# Patient Record
Sex: FEMALE | Race: WHITE | NOT HISPANIC OR LATINO | Employment: OTHER | URBAN - METROPOLITAN AREA
[De-identification: names, ages, dates, MRNs, and addresses within clinical notes are randomized per-mention and may not be internally consistent; named-entity substitution may affect disease eponyms.]

---

## 2017-03-07 ENCOUNTER — TELEPHONE (OUTPATIENT)
Dept: MEDICAL GROUP | Facility: MEDICAL CENTER | Age: 82
End: 2017-03-07

## 2017-03-07 DIAGNOSIS — R73.01 IMPAIRED FASTING GLUCOSE: ICD-10-CM

## 2017-03-07 NOTE — TELEPHONE ENCOUNTER
1. Caller Name: Ang Lancaster  Call Back Number: [phone] (home)         Patient approves a detailed voicemail message: yes    2. SPECIFIC Action To Be Taken: Orders needed    3. Diagnosis/Clinical Reason for Request: Upcoming apt    4. Specialty & Provider Name/Lab/Imaging Location: [facility]    5. Is appointment scheduled for requested order/referral: yes - apt is on 3/15/17

## 2017-03-30 ENCOUNTER — HOSPITAL ENCOUNTER (OUTPATIENT)
Dept: LAB | Facility: MEDICAL CENTER | Age: 82
End: 2017-03-30
Attending: NURSE PRACTITIONER
Payer: MEDICARE

## 2017-03-30 DIAGNOSIS — R73.01 IMPAIRED FASTING GLUCOSE: ICD-10-CM

## 2017-03-30 LAB
ALBUMIN SERPL BCP-MCNC: 4 G/DL (ref 3.2–4.9)
ALBUMIN/GLOB SERPL: 1.3 G/DL
ALP SERPL-CCNC: 46 U/L (ref 30–99)
ALT SERPL-CCNC: 13 U/L (ref 2–50)
ANION GAP SERPL CALC-SCNC: 7 MMOL/L (ref 0–11.9)
AST SERPL-CCNC: 17 U/L (ref 12–45)
BILIRUB SERPL-MCNC: 0.8 MG/DL (ref 0.1–1.5)
BUN SERPL-MCNC: 15 MG/DL (ref 8–22)
CALCIUM SERPL-MCNC: 8.9 MG/DL (ref 8.4–10.2)
CHLORIDE SERPL-SCNC: 102 MMOL/L (ref 96–112)
CHOLEST SERPL-MCNC: 191 MG/DL (ref 100–199)
CO2 SERPL-SCNC: 28 MMOL/L (ref 20–33)
CREAT SERPL-MCNC: 1.04 MG/DL (ref 0.5–1.4)
CREAT UR-MCNC: 83.8 MG/DL
EST. AVERAGE GLUCOSE BLD GHB EST-MCNC: 117 MG/DL
GFR SERPL CREATININE-BSD FRML MDRD: 51 ML/MIN/1.73 M 2
GLOBULIN SER CALC-MCNC: 3 G/DL (ref 1.9–3.5)
GLUCOSE SERPL-MCNC: 100 MG/DL (ref 65–99)
HBA1C MFR BLD: 5.7 % (ref 0–5.6)
HDLC SERPL-MCNC: 106 MG/DL
LDLC SERPL CALC-MCNC: 78 MG/DL
MICROALBUMIN UR-MCNC: <0.7 MG/DL
MICROALBUMIN/CREAT UR: NORMAL MG/G (ref 0–30)
POTASSIUM SERPL-SCNC: 3.9 MMOL/L (ref 3.6–5.5)
PROT SERPL-MCNC: 7 G/DL (ref 6–8.2)
SODIUM SERPL-SCNC: 137 MMOL/L (ref 135–145)
TRIGL SERPL-MCNC: 37 MG/DL (ref 0–149)

## 2017-03-30 PROCEDURE — 80061 LIPID PANEL: CPT | Mod: GA

## 2017-03-30 PROCEDURE — 83036 HEMOGLOBIN GLYCOSYLATED A1C: CPT | Mod: GA

## 2017-03-30 PROCEDURE — 82043 UR ALBUMIN QUANTITATIVE: CPT

## 2017-03-30 PROCEDURE — 80053 COMPREHEN METABOLIC PANEL: CPT

## 2017-03-30 PROCEDURE — 36415 COLL VENOUS BLD VENIPUNCTURE: CPT

## 2017-03-30 PROCEDURE — 82570 ASSAY OF URINE CREATININE: CPT

## 2017-04-04 ENCOUNTER — OFFICE VISIT (OUTPATIENT)
Dept: MEDICAL GROUP | Facility: MEDICAL CENTER | Age: 82
End: 2017-04-04
Payer: MEDICARE

## 2017-04-04 VITALS
BODY MASS INDEX: 22.71 KG/M2 | HEART RATE: 58 BPM | OXYGEN SATURATION: 98 % | HEIGHT: 64 IN | SYSTOLIC BLOOD PRESSURE: 110 MMHG | TEMPERATURE: 98.1 F | WEIGHT: 133 LBS | DIASTOLIC BLOOD PRESSURE: 68 MMHG

## 2017-04-04 DIAGNOSIS — M79.604 RIGHT LEG PAIN: ICD-10-CM

## 2017-04-04 DIAGNOSIS — R25.3 EYE MUSCLE TWITCHES: ICD-10-CM

## 2017-04-04 DIAGNOSIS — R73.01 IMPAIRED FASTING GLUCOSE: ICD-10-CM

## 2017-04-04 DIAGNOSIS — N18.30 CKD (CHRONIC KIDNEY DISEASE) STAGE 3, GFR 30-59 ML/MIN (HCC): ICD-10-CM

## 2017-04-04 DIAGNOSIS — R19.4 CHANGE IN BOWEL HABITS: ICD-10-CM

## 2017-04-04 DIAGNOSIS — M25.551 RIGHT HIP PAIN: ICD-10-CM

## 2017-04-04 PROCEDURE — 99214 OFFICE O/P EST MOD 30 MIN: CPT | Performed by: NURSE PRACTITIONER

## 2017-04-04 PROCEDURE — 1036F TOBACCO NON-USER: CPT | Performed by: NURSE PRACTITIONER

## 2017-04-04 PROCEDURE — G8432 DEP SCR NOT DOC, RNG: HCPCS | Performed by: NURSE PRACTITIONER

## 2017-04-04 PROCEDURE — 1101F PT FALLS ASSESS-DOCD LE1/YR: CPT | Performed by: NURSE PRACTITIONER

## 2017-04-04 PROCEDURE — G8420 CALC BMI NORM PARAMETERS: HCPCS | Performed by: NURSE PRACTITIONER

## 2017-04-04 PROCEDURE — 4040F PNEUMOC VAC/ADMIN/RCVD: CPT | Performed by: NURSE PRACTITIONER

## 2017-04-04 RX ORDER — DIAZEPAM 5 MG/1
5 TABLET ORAL
Qty: 90 TAB | Refills: 1 | Status: SHIPPED | OUTPATIENT
Start: 2017-04-04 | End: 2017-05-04 | Stop reason: SDUPTHER

## 2017-04-04 NOTE — MR AVS SNAPSHOT
"Ang CONSTANCE Lancaster   2017 3:00 PM   Office Visit   MRN: [id number]    Department:  [facility]   Dept Phone:  [phone]    Description:  Female : 1935   Provider:  MARIUSZ Guerrero           Allergies as of 2017     Allergen Noted Reactions    Nkda [No Known Drug Allergy] 09/10/2010       Other Environmental 2009       pollens      You were diagnosed with     Right hip pain   [777567]   improved.  call if pain reoccurs.  will do hip and lumbar xray    Right leg pain   [618315]       CKD (chronic kidney disease) stage 3, GFR 30-59 ml/min   [611900]   inc water intake.    Change in bowel habits   [021336]   check FIT test.  f/u with pt wiht results.     Impaired fasting glucose   [790.21.ICD-9-CM]   stable and improved.  plan recheck 1 yr    Eye muscle twitches   [316858]   refill valium. if gets worse f/u for eval      Vital Signs     Blood Pressure Pulse Temperature Height Weight Body Mass Index    110/68 mmHg 58 36.7 °C (98.1 °F) 1.626 m (5' 4\") 60.328 kg (133 lb) 22.82 kg/m2    Oxygen Saturation Breastfeeding? Smoking Status             98% No Former Smoker         Basic Information     Date Of Birth Sex Race Ethnicity Preferred Language    1935 Female White Non- English      Your appointments     Oct 05, 2017  2:00 PM   Established Patient with MARIUSZ Guerrero   [facility])    [address]  [facility] [address]   [phone]           You will be receiving a confirmation call a few days before your appointment from our automated call confirmation system.              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   Unknown - Present    Degenerative joint disease M19.90   Unknown - Present    Impaired fasting glucose R73.01   Unknown - Present    Osteoporosis M81.0   Unknown - Present      Health Maintenance        Date Due Completion Dates    IMM DTaP/Tdap/Td Vaccine (1 - Tdap) 2/2/1956 ---    IMM ZOSTER VACCINE 2/2/1997 ---    COLONOSCOPY 8/1/2017 8/1/2007 (Done)    Override on 8/1/2007: Done    BONE DENSITY 6/28/2021 6/28/2016, 6/27/2014, 6/1/2012, 5/19/2010            Current Immunizations     13-VALENT PCV PREVNAR 7/8/2015 12:24 PM    Influenza TIV (IM) 10/21/2011    Influenza Vaccine Adult HD 9/27/2016 10:32 AM, 9/30/2014    Influenza Vaccine Pediatric 11/10/2010 11:30 AM    Pneumococcal polysaccharide vaccine (PPSV-23) 10/21/2007      Below and/or attached are the medications your provider expects you to take. Review all of your home medications and newly ordered medications with your provider and/or pharmacist. Follow medication instructions as directed by your provider and/or pharmacist. Please keep your medication list with you and share with your provider. Update the information when medications are discontinued, doses are changed, or new medications (including over-the-counter products) are added; and carry medication information at all times in the event of emergency situations     Allergies:  NKDA - (reactions not documented)     OTHER ENVIRONMENTAL - (reactions not documented)               Medications  Valid as of: April 04, 2017 -  3:39 PM    Generic Name Brand Name Tablet Size Instructions for use    Calcium Carbonate-Vitamin D   Take 1 Tab by mouth every day.        DiazePAM (Tab) VALIUM 5 MG Take 1 Tab by mouth every 24 hours as needed for Anxiety.        Famotidine (Tab) PEPCID 20 MG Take 20 mg by mouth every day.        Multiple Vitamins-Minerals   Take 1 Tab by mouth every day.        .                 Medicines prescribed today were sent to:     [id number] - [place]  [place] - [address] [facility]    [address] [facility] [place] [address]    Phone: [phone] Fax: [phone]    Open 24 Hours?: No      Medication refill instructions:       If your prescription bottle indicates you have medication refills left, it is not necessary to call your provider’s office. Please contact your pharmacy and they will refill your medication.    If your prescription bottle indicates you do not have any refills left, you may request refills at any time through one of the following ways: The online [employer] system (except Urgent Care), by calling your provider’s office, or by asking your pharmacy to contact your provider’s office with a refill request. Medication refills are processed only during regular business hours and may not be available until the next business day. Your provider may request additional information or to have a follow-up visit with you prior to refilling your medication.   *Please Note: Medication refills are assigned a new Rx number when refilled electronically. Your pharmacy may indicate that no refills were authorized even though a new prescription for the same medication is available at the pharmacy. Please request the medicine by name with the pharmacy before contacting your provider for a refill.        Your To Do List     Future Labs/Procedures Complete By Expires    OCCULT BLOOD FECES IMMUNOASSAY  As directed 4/5/2018         [facility] Status: Patient Declined

## 2017-04-04 NOTE — PROGRESS NOTES
Subjective:      Ang Lancaster is a 82 y.o. female who presents with No chief complaint on file.            HPI  Seen in f/u for difficulty walking.  About 4 months ago she woke up with her rt leg aching.  She finally went back to sleep but walking helped the pain.  The pain then continued at nite.    Then about 1 month later she was walking.  About 1/4 mile later her rt hip started hurting.  She started to limp.  After that she had pain for several weeks.    That is finally getting better.  Leg pain at nite is getting better.  Will still occas wake her with milder pain.    Reviewed lab with pt.  Her CMP is wnl except glucose is borderline.  a1c is stable and sl dec from last year at 5.7.  Alb/cr ratio, LP is wnl.    GFR is dec to 51.  Was 64 last time.  Not drinking much water.  She feels that her eye twitching is not controlled.  She is still on valium for tx.    She has recently noted change in bowel habits.  She is having several little bms during the day instead of one lg bm daily that she was having.     Patient Active Problem List    Diagnosis Date Noted   • Osteoporosis    • Recurrent UTI    • Meige syndrome (blepharospasm with oromandibular dystonia)    • Degenerative joint disease    • Impaired fasting glucose    • Vertigo 03/20/2012   • Dystonia 11/10/2011   • Preventative health care 11/10/2009   • [place] (hard of hearing)      Current Outpatient Prescriptions   Medication Sig Dispense Refill   • diazepam (VALIUM) 5 MG Tab TAKE 1 TABLET BY MOUTH EVERY 24 HOURS AS NEEDED FOR TREMORS-MUST LAST 30 DAYS 30 Tab 5   • famotidine (PEPCID) 20 MG TABS Take 20 mg by mouth every day.     • MULTIVITAL PO Take 1 Tab by mouth every day.     • CALTRATE 600+D PO Take 1 Tab by mouth every day.       No current facility-administered medications for this visit.     Allergies   Allergen Reactions   • Nkda [No Known Drug Allergy]    • Other Environmental      pollens       ROS    Review of Systems   Constitutional: Negative.   "Negative for fever, chills, weight loss, malaise/fatigue and diaphoresis.   HENT: Negative.  Negative for hearing loss, ear pain, nosebleeds, congestion, sore throat, neck pain, tinnitus and ear discharge.    Respiratory: Negative.  Negative for cough, hemoptysis, sputum production, shortness of breath, wheezing and stridor.    Cardiovascular: Negative.  Negative for chest pain, palpitations, orthopnea, claudication, leg swelling and PND.   Gastrointestinal: denies nausea, vomiting, diarrhea, constipation, heartburn, melena or hematochezia.  Genitourinary: Denies dysuria, hematuria, urinary incontinence, frequency or urgency.         Objective:     /68 mmHg  Pulse 58  Temp(Src) 36.7 °C (98.1 °F)  Ht 1.626 m (5' 4\")  Wt 60.328 kg (133 lb)  BMI 22.82 kg/m2  SpO2 98%  Breastfeeding? No     Physical Exam      Physical Exam   Vitals reviewed.  Constitutional: oriented to person, place, and time. appears well-developed and well-nourished. No distress.   Cardiovascular: Normal rate, regular rhythm, normal heart sounds and intact distal pulses.  Exam reveals no gallop and no friction rub.  No murmur heard.  No carotid bruits. Twitching marlyn eyes and head.   Pulmonary/Chest: Effort normal and breath sounds normal. No stridor. No respiratory distress. no wheezes or rales. exhibits no tenderness.   Musculoskeletal: Normal range of motion. exhibits 1+ left pedal edema. marlyn pedal pulses 2+.  Lymphadenopathy: no cervical or supraclavicular adenopathy.   Abd:  No CVAT,  Soft,  Bs noted in all quadrants.  No HSM.  No abdominal tenderness.  Neurological: alert and oriented to person, place, and time. exhibits normal muscle tone. Coordination normal.   Skin: Skin is warm and dry. no diaphoresis.   Psychiatric: normal mood and affect. behavior is normal.            Assessment/Plan:     1. Right hip pain      improved.  call if pain reoccurs.  will do hip and lumbar xray   2. Right leg pain     3. CKD (chronic kidney " disease) stage 3, GFR 30-59 ml/min      inc water intake.   4. Change in bowel habits  OCCULT BLOOD FECES IMMUNOASSAY    check FIT test.  f/u with pt wiht results.    5. Impaired fasting glucose      stable and improved.  plan recheck 1 yr   6. Eye muscle twitches  diazepam (VALIUM) 5 MG Tab    refill valium. if gets worse f/u for eval

## 2017-04-07 LAB — HEMOCCULT STL QL IA: NEGATIVE

## 2017-04-12 ENCOUNTER — TELEPHONE (OUTPATIENT)
Dept: MEDICAL GROUP | Facility: MEDICAL CENTER | Age: 82
End: 2017-04-12

## 2017-04-12 RX ORDER — DIAZEPAM 5 MG/1
5 TABLET ORAL
Qty: 5 TAB | Refills: 0 | Status: SHIPPED
Start: 2017-04-12 | End: 2017-10-11

## 2017-04-12 NOTE — TELEPHONE ENCOUNTER
I gave her 5 tabs only.  Please fax to pharmacy and let her know.  I doubt that it will make a difference.

## 2017-04-12 NOTE — TELEPHONE ENCOUNTER
Pt called stated she got the diazepam-pt is asking if she can try the brand name of valium . Pt states she only wants a few tabs to compare the brand and generic.

## 2017-05-03 DIAGNOSIS — R25.3 EYE MUSCLE TWITCHES: ICD-10-CM

## 2017-05-03 RX ORDER — DIAZEPAM 5 MG/1
TABLET ORAL
OUTPATIENT
Start: 2017-05-03

## 2017-05-03 NOTE — TELEPHONE ENCOUNTER
She is not due for refill.  I had given her a small supply of brand name only valium.  Is that what she is calling for?

## 2017-05-04 RX ORDER — DIAZEPAM 5 MG/1
5 TABLET ORAL
Qty: 90 TAB | Refills: 1 | Status: SHIPPED
Start: 2017-05-04 | End: 2017-12-11 | Stop reason: SDUPTHER

## 2017-05-04 NOTE — TELEPHONE ENCOUNTER
I see a generic valium refill from 4/4/17 with refills.  Please check with pharm acy and do  to see if that was received by pharmacy or filled.

## 2017-05-04 NOTE — TELEPHONE ENCOUNTER
Pt states she was only given 5 brand name tabs. Pt states she wants the regular script-pt states it has been over 30 days since the last script

## 2017-10-11 ENCOUNTER — OFFICE VISIT (OUTPATIENT)
Dept: MEDICAL GROUP | Facility: MEDICAL CENTER | Age: 82
End: 2017-10-11
Payer: MEDICARE

## 2017-10-11 VITALS
DIASTOLIC BLOOD PRESSURE: 70 MMHG | SYSTOLIC BLOOD PRESSURE: 110 MMHG | HEIGHT: 64 IN | OXYGEN SATURATION: 98 % | TEMPERATURE: 97.9 F | BODY MASS INDEX: 22.71 KG/M2 | WEIGHT: 133 LBS | HEART RATE: 81 BPM

## 2017-10-11 DIAGNOSIS — Z91.81 RISK FOR FALLS: ICD-10-CM

## 2017-10-11 DIAGNOSIS — G51.4 FACIAL TWITCHING: ICD-10-CM

## 2017-10-11 DIAGNOSIS — R60.0 PEDAL EDEMA: ICD-10-CM

## 2017-10-11 PROCEDURE — 99214 OFFICE O/P EST MOD 30 MIN: CPT | Performed by: NURSE PRACTITIONER

## 2017-10-11 ASSESSMENT — PATIENT HEALTH QUESTIONNAIRE - PHQ9: CLINICAL INTERPRETATION OF PHQ2 SCORE: 0

## 2017-10-11 NOTE — PROGRESS NOTES
Subjective:     Ang Lancaster is a 82 y.o. female who presents with No chief complaint on file.  .    HPI:   Seen in f/u for left leg swelling.  Feeling well.   She is having left leg swelling.  Its used to be on marlyn feet, now just on left.  Only lower leg and foot is swollen.  No pain.  No SOB.  ABRAHAN in 2015 was wnl. She has SVETLANA hose but not wearing them.   She continues to use the valium for facial twitches.        Patient Active Problem List    Diagnosis Date Noted   • Risk for falls 10/11/2017   • Osteoporosis    • Recurrent UTI    • Meige syndrome (blepharospasm with oromandibular dystonia)    • Degenerative joint disease    • Impaired fasting glucose    • Vertigo 03/20/2012   • Dystonia 11/10/2011   • Preventative health care 11/10/2009   • [place] (hard of hearing)        Current medicines (including changes today)  Current Outpatient Prescriptions   Medication Sig Dispense Refill   • diazepam (VALIUM) 5 MG Tab Take 1 Tab by mouth every 24 hours as needed for Anxiety. 90 Tab 1   • famotidine (PEPCID) 20 MG TABS Take 20 mg by mouth every day.     • MULTIVITAL PO Take 1 Tab by mouth every day.     • CALTRATE 600+D PO Take 1 Tab by mouth every day.       No current facility-administered medications for this visit.        Allergies   Allergen Reactions   • Nkda [No Known Drug Allergy]    • Other Environmental      pollens       ROS  Constitutional: Negative. Negative for fever, chills, weight loss, malaise/fatigue and diaphoresis.   HENT: Negative. Negative for hearing loss, ear pain, nosebleeds, congestion, sore throat, neck pain, tinnitus and ear discharge.   Respiratory: Negative. Negative for cough, hemoptysis, sputum production, shortness of breath, wheezing and stridor.   Cardiovascular: Negative. Negative for chest pain, palpitations, orthopnea, claudication, leg swelling and PND.   Gastrointestinal: Denies nausea, vomiting, diarrhea, constipation, heartburn, melena or hematochezia.  Genitourinary: Denies  "dysuria, hematuria, urinary incontinence, frequency or urgency.        Objective:     Blood pressure 110/70, pulse 81, temperature 36.6 °C (97.9 °F), height 1.626 m (5' 4\"), weight 60.3 kg (133 lb), SpO2 98 %, not currently breastfeeding. Body mass index is 22.83 kg/m².    Physical Exam:  Vitals reviewed.  Constitutional: Oriented to person, place, and time. appears well-developed and well-nourished. No distress.   Cardiovascular: Normal rate, regular rhythm, normal heart sounds and intact distal pulses. Exam reveals no gallop and no friction rub. No murmur heard. No carotid bruits.   Pulmonary/Chest: Effort normal and breath sounds normal. No stridor. No respiratory distress. no wheezes or rales. exhibits no tenderness.   Musculoskeletal: Normal range of motion. exhibits no edema. marlyn pedal pulses 2+.  Lymphadenopathy: No cervical or supraclavicular adenopathy.   Neurological: Alert and oriented to person, place, and time. exhibits normal muscle tone.  Skin: Skin is warm and dry. No diaphoresis.   Psychiatric: Normal mood and affect. Behavior is normal.      Assessment and Plan:     The following treatment plan was discussed:    1. Pedal edema      left leg only.  wear compression hose.  elevate legs with sitting.  low na diet.    2. Facial twitching      stable with valium.  f/u 3/18.  call for lab slip     3. Risk for falls  Patient identified as fall risk.  Appropriate orders and counseling given.         Followup: Return in about 5 months (around 3/11/2018).  "

## 2017-10-23 ENCOUNTER — NON-PROVIDER VISIT (OUTPATIENT)
Dept: URGENT CARE | Facility: CLINIC | Age: 82
End: 2017-10-23
Payer: MEDICARE

## 2017-10-23 DIAGNOSIS — Z23 NEED FOR INFLUENZA VACCINATION: ICD-10-CM

## 2017-10-23 PROCEDURE — G0008 ADMIN INFLUENZA VIRUS VAC: HCPCS | Performed by: FAMILY MEDICINE

## 2017-10-23 PROCEDURE — 90662 IIV NO PRSV INCREASED AG IM: CPT | Performed by: FAMILY MEDICINE

## 2017-11-15 DIAGNOSIS — R25.3 EYE MUSCLE TWITCHES: ICD-10-CM

## 2017-11-16 RX ORDER — DIAZEPAM 5 MG/1
TABLET ORAL
OUTPATIENT
Start: 2017-11-16

## 2017-12-08 DIAGNOSIS — R25.3 EYE MUSCLE TWITCHES: ICD-10-CM

## 2017-12-08 RX ORDER — DIAZEPAM 5 MG/1
TABLET ORAL
OUTPATIENT
Start: 2017-12-08

## 2017-12-11 RX ORDER — DIAZEPAM 5 MG/1
5 TABLET ORAL
Qty: 90 TAB | Refills: 1 | Status: SHIPPED
Start: 2017-12-11 | End: 2018-04-22

## 2018-01-15 ENCOUNTER — PATIENT OUTREACH (OUTPATIENT)
Dept: HEALTH INFORMATION MANAGEMENT | Facility: OTHER | Age: 83
End: 2018-01-15

## 2018-01-15 NOTE — PROGRESS NOTES
1. Attempt #: 1    2. [facility] Verified: NO    3. Verify PCP: yes    4. Care Team Updated:       •   DME Company (gait device, O2, CPAP, etc.): YES-NONE       •   Other Specialists (eye doctor, derm, GYN, cardiology, endo, etc): YES    5.  Reviewed/Updated the following with patient:       •   Communication Preference Obtained? YES       •   Preferred Pharmacy? YES       •   Preferred Lab? YES       •   Family History (document living status of immediate family members and if + hx of cancer, diabetes, hypertension, hyperlipidemia, heart attack, stroke) YES. Was Abstract Encounter opened and chart updated? YES    6. [employer] Activation: DECLINED    7. [employer] Chasity: no    8. Annual Wellness Visit Scheduling  Scheduling Status:Scheduled      9. Care Gap Scheduling (Attempt to Schedule EACH Overdue Care Gap!)     Health Maintenance Due   Topic Date Due   • IMM DTaP/Tdap/Td Vaccine (1 - Tdap) 02/02/1956   • IMM ZOSTER VACCINE  02/02/1997   • Annual Wellness Visit  07/08/2016        Scheduled patient for Annual Wellness Visit      10. Patient was advised: “This is a free wellness visit. The provider will screen for medical conditions to help you stay healthy. If you have other concerns to address you may be asked to discuss these at a separate visit or there may be an additional fee.”     11. Patient was informed to arrive 15 min prior to their scheduled appointment and bring in their medication bottles.

## 2018-01-26 ENCOUNTER — TELEPHONE (OUTPATIENT)
Dept: MEDICAL GROUP | Facility: MEDICAL CENTER | Age: 83
End: 2018-01-26

## 2018-01-26 DIAGNOSIS — R73.09 ELEVATED HEMOGLOBIN A1C: ICD-10-CM

## 2018-01-26 NOTE — TELEPHONE ENCOUNTER
Lab orders placed for cmp and a1c.   Prior lipid panel reviewed, repeat not needed at this time (insurance will not cover)

## 2018-01-26 NOTE — TELEPHONE ENCOUNTER
1. Caller Name: Pt                      Call Back Number: [phone] (home)     2. Message: Pt called requesting lab orders to be placed prior to her appt on 2/5    3. Patient approves office to leave a detailed voicemail/MyChart message: yes

## 2018-01-29 ENCOUNTER — TELEPHONE (OUTPATIENT)
Dept: MEDICAL GROUP | Facility: MEDICAL CENTER | Age: 83
End: 2018-01-29

## 2018-01-29 NOTE — TELEPHONE ENCOUNTER
PVP WITH OUTREACH  Future Appointments       Provider Department Center    2/5/2018 1:20 PM MARIUSZ Maria; [facility]  [facility]    3/15/2018 2:00 PM MARIUSZ Maria [facility]          ANNUAL WELLNESS VISIT PRE-VISIT PLANNING     1.  Immunizations were updated in [facility] using WebIZ?: Yes       •  WebIZ Recommendations:   PCV-13 (Prevnar 13)   Zoster, Live (Shingles)   Influenza w/preserv.   Tdap            •  Is patient due for Tdap? YES. Patient was not notified of copay/out of pocket cost.       •  Is patient due for Shingles? YES. Patient was not notified of copay/out of pocket cost.     2.  Specialty Comments was updated with diagnosis information provided by [facility]: NO

## 2018-01-31 ENCOUNTER — HOSPITAL ENCOUNTER (OUTPATIENT)
Dept: LAB | Facility: MEDICAL CENTER | Age: 83
End: 2018-01-31
Attending: NURSE PRACTITIONER
Payer: MEDICARE

## 2018-01-31 DIAGNOSIS — R73.09 ELEVATED HEMOGLOBIN A1C: ICD-10-CM

## 2018-01-31 LAB
ALBUMIN SERPL BCP-MCNC: 4 G/DL (ref 3.2–4.9)
ALBUMIN/GLOB SERPL: 1.7 G/DL
ALP SERPL-CCNC: 37 U/L (ref 30–99)
ALT SERPL-CCNC: 7 U/L (ref 2–50)
ANION GAP SERPL CALC-SCNC: 5 MMOL/L (ref 0–11.9)
AST SERPL-CCNC: 14 U/L (ref 12–45)
BILIRUB SERPL-MCNC: 0.6 MG/DL (ref 0.1–1.5)
BUN SERPL-MCNC: 17 MG/DL (ref 8–22)
CALCIUM SERPL-MCNC: 8.7 MG/DL (ref 8.5–10.5)
CHLORIDE SERPL-SCNC: 107 MMOL/L (ref 96–112)
CO2 SERPL-SCNC: 28 MMOL/L (ref 20–33)
CREAT SERPL-MCNC: 0.93 MG/DL (ref 0.5–1.4)
EST. AVERAGE GLUCOSE BLD GHB EST-MCNC: 114 MG/DL
GLOBULIN SER CALC-MCNC: 2.4 G/DL (ref 1.9–3.5)
GLUCOSE SERPL-MCNC: 88 MG/DL (ref 65–99)
HBA1C MFR BLD: 5.6 % (ref 0–5.6)
POTASSIUM SERPL-SCNC: 4 MMOL/L (ref 3.6–5.5)
PROT SERPL-MCNC: 6.4 G/DL (ref 6–8.2)
SODIUM SERPL-SCNC: 140 MMOL/L (ref 135–145)

## 2018-01-31 PROCEDURE — 36415 COLL VENOUS BLD VENIPUNCTURE: CPT

## 2018-01-31 PROCEDURE — 80053 COMPREHEN METABOLIC PANEL: CPT

## 2018-01-31 PROCEDURE — 83036 HEMOGLOBIN GLYCOSYLATED A1C: CPT | Mod: GA

## 2018-02-05 ENCOUNTER — OFFICE VISIT (OUTPATIENT)
Dept: MEDICAL GROUP | Facility: MEDICAL CENTER | Age: 83
End: 2018-02-05
Payer: MEDICARE

## 2018-02-05 VITALS
SYSTOLIC BLOOD PRESSURE: 110 MMHG | TEMPERATURE: 97 F | OXYGEN SATURATION: 98 % | HEIGHT: 64 IN | HEART RATE: 83 BPM | BODY MASS INDEX: 22.81 KG/M2 | WEIGHT: 133.6 LBS | DIASTOLIC BLOOD PRESSURE: 70 MMHG

## 2018-02-05 DIAGNOSIS — N39.0 RECURRENT UTI: ICD-10-CM

## 2018-02-05 DIAGNOSIS — G24.4 MEIGE SYNDROME (BLEPHAROSPASM WITH OROMANDIBULAR DYSTONIA): ICD-10-CM

## 2018-02-05 DIAGNOSIS — H91.8X2 OTHER SPECIFIED HEARING LOSS OF LEFT EAR, UNSPECIFIED HEARING STATUS ON CONTRALATERAL SIDE: ICD-10-CM

## 2018-02-05 DIAGNOSIS — R42 VERTIGO: ICD-10-CM

## 2018-02-05 DIAGNOSIS — M15.9 PRIMARY OSTEOARTHRITIS INVOLVING MULTIPLE JOINTS: ICD-10-CM

## 2018-02-05 DIAGNOSIS — R73.01 IFG (IMPAIRED FASTING GLUCOSE): ICD-10-CM

## 2018-02-05 DIAGNOSIS — Z91.81 RISK FOR FALLS: ICD-10-CM

## 2018-02-05 DIAGNOSIS — M81.6 LOCALIZED OSTEOPOROSIS WITHOUT CURRENT PATHOLOGICAL FRACTURE: ICD-10-CM

## 2018-02-05 PROCEDURE — G0439 PPPS, SUBSEQ VISIT: HCPCS | Performed by: NURSE PRACTITIONER

## 2018-02-05 RX ORDER — TIZANIDINE 4 MG/1
4 TABLET ORAL
Qty: 30 TAB | Refills: 3 | Status: SHIPPED | OUTPATIENT
Start: 2018-02-05 | End: 2018-04-22

## 2018-02-05 ASSESSMENT — PATIENT HEALTH QUESTIONNAIRE - PHQ9
SUM OF ALL RESPONSES TO PHQ QUESTIONS 1-9: 2
5. POOR APPETITE OR OVEREATING: 0 - NOT AT ALL
CLINICAL INTERPRETATION OF PHQ2 SCORE: 2

## 2018-02-05 NOTE — PROGRESS NOTES
Chief Complaint   Patient presents with   • Annual Wellness Visit         HPI:  Ang is a 83 y.o. here for Medicare Annual Wellness Visit.  Seen in f/u for HRA.    Reviewed lab withpt.  Her a1c is down from 5.7 to 5.6.    GFR is up from 51 to 58.    CMP, LP is wnl    Patient Active Problem List    Diagnosis Date Noted   • IFG (impaired fasting glucose)    • Risk for falls 10/11/2017   • Osteoporosis    • Recurrent UTI    • Meige syndrome (blepharospasm with oromandibular dystonia)    • Degenerative joint disease    • Impaired fasting glucose    • Vertigo 03/20/2012   • Preventative health care 11/10/2009   • [place] (hard of hearing)        Current Outpatient Prescriptions   Medication Sig Dispense Refill   • tizanidine (ZANAFLEX) 4 MG Tab Take 1 Tab by mouth every bedtime. 30 Tab 3   • diazepam (VALIUM) 5 MG Tab Take 1 Tab by mouth every 24 hours as needed for Anxiety. 90 Tab 1   • famotidine (PEPCID) 20 MG TABS Take 20 mg by mouth every day.     • MULTIVITAL PO Take 1 Tab by mouth every day.     • CALTRATE 600+D PO Take 1 Tab by mouth every day.       No current facility-administered medications for this visit.         Patient is taking medications as noted in medication list.  Current supplements as per medication list.     Allergies: Nkda [no known drug allergy] and Other environmental    Current social contact/activities: Pt watches television.     Is patient current with immunizations? No, due for TDAP and ZOSTAVAX (Shingles). Patient is interested in receiving NONE today.    She  reports that she quit smoking about 14 years ago. Her smoking use included Cigarettes. She has a 67.50 pack-year smoking history. She has never used smokeless tobacco. She reports that she drinks about 3.5 - 17.5 oz of alcohol per week . She reports that she does not use drugs.  Counseling given: Yes        DPA/Advanced directive: Patient has Advanced Directive, but it is not on file. Instructed to bring in a copy to scan into their  chart.    ROS:    Gait: Uses no assistive device   Ostomy: no   Other tubes: no   Amputations: no   Chronic oxygen use no   Last eye exam 1 year ago    Wears hearing aids: no   : Reports urinary leakage during the last 6 months that has not interfered at all with their daily activities or sleep.  Review of Systems   Constitutional: Negative.  Negative for fever, chills, weight loss, malaise/fatigue and diaphoresis.   HENT: Negative.  Negative for hearing loss, ear pain, nosebleeds, congestion, sore throat, neck pain, tinnitus and ear discharge.    Eyes: Negative.  Negative for blurred vision, double vision, photophobia, pain, discharge and redness.   Respiratory: Negative.  Negative for cough, hemoptysis, sputum production, shortness of breath, wheezing and stridor.    Cardiovascular: Negative.  Negative for chest pain, palpitations, orthopnea, claudication, leg swelling and PND.   Gastrointestinal: Negative.  Negative for nausea, vomiting, abdominal pain, diarrhea, constipation, blood in stool and melena. occas heartburn.  Genitourinary: Negative.  Negative for dysuria, urgenc y, frequency, incontinence, hematuria and flank pain.   Musculoskeletal: Negative.  Negative for myalgias, back pain, joint pain and falls.   Skin: Negative.  Negative for itching and rash.   Neurological: Negative.  Negative for dizziness, tingling, tremors, sensory change, speech change, focal weakness, seizures, loss of consciousness, weakness and headaches.   Endo/Heme/Allergies: Negative.  Negative for environmental allergies and polydipsia. Does not bruise/bleed easily.   Psychiatric/Behavioral: Negative.  Negative for depression, suicidal ideas, hallucinations, memory loss and substance abuse. The patient is not nervous/anxious and does not have insomnia.    All other systems reviewed and are negative.          Depression Screening    Little interest or pleasure in doing things?  0 - not at all  Feeling down, depressed, or hopeless?  2 - more than half the days  Trouble falling or staying asleep, or sleeping too much?  0 - not at all  Feeling tired or having little energy?  0 - not at all  Poor appetite or overeating?  0 - not at all  Feeling bad about yourself - or that you are a failure or have let yourself or your family down? 0 - not at all  Trouble concentrating on things, such as reading the newspaper or watching television? 0 - not at all  Moving or speaking so slowly that other people could have noticed.  Or the opposite - being so fidgety or restless that you have been moving around a lot more than usual?  0 - not at all  Thoughts that you would be better off dead, or of hurting yourself?  0 - not at all  Patient Health Questionnaire Score: 2      If depressive symptoms identified deferred to follow up visit unless specifically addressed in assessment and plan.    Interpretation of PHQ-9 Total Score   Score Severity   1-4 No Depression   5-9 Mild Depression   10-14 Moderate Depression   15-19 Moderately Severe Depression   20-27 Severe Depression      Screening for Cognitive Impairment    Three Minute Recall (apple, watch, pneny)  1/3    Draw clock face with all 12 numbers set to the hand to show 10 minutes past 11 o'clock  1 5/5  If cognitive concerns identified, deferred for follow up unless specifically addressed in assessment and plan.    Fall Risk Assessment    Has the patient had two or more falls in the last year or any fall with injury in the last year?  No  If fall risk identified, deferred for follow up unless specifically addressed in assessment and plan.      Safety Assessment    Throw rugs on floor.  Yes  Handrails on all stairs.  Yes  Good lighting in all hallways.  Yes  Difficulty hearing.  No  Patient counseled about all safety risks that were identified.    Functional Assessment ADLs    Are there any barriers preventing you from cooking for yourself or meeting nutritional needs?  No.    Are there any barriers preventing  you from driving safely or obtaining transportation?  No.    Are there any barriers preventing you from using a telephone or calling for help?  No.    Are there any barriers preventing you from shopping?  No.    Are there any barriers preventing you from taking care of your own finances?  No.    Are there any barriers preventing you from managing your medications?  No.    Are you currently engaging any exercise or physical activity?  No.       Health Maintenance Summary                IMM DTaP/Tdap/Td Vaccine Overdue 2/2/1956     IMM ZOSTER VACCINE Overdue 2/2/1997     Annual Wellness Visit Overdue 7/8/2016      Done 7/8/2015     COLON CANCER SCREENING ANNUAL FIT Next Due 4/6/2018      Done 4/6/2017 OCCULT BLOOD,FECAL,IMMUNOASSAY     Patient has more history with this topic...    BONE DENSITY Next Due 6/28/2021      Done 6/28/2016 DS-BONE DENSITY STUDY (DEXA)     Patient has more history with this topic...          Patient Care Team:  MARIUSZ Maria as PCP - General  Ming Whitten M.D. as Consulting Physician (Ophthalmology)      Social History   Substance Use Topics   • Smoking status: Former Smoker     Packs/day: 1.50     Years: 45.00     Types: Cigarettes     Quit date: 1/1/2004   • Smokeless tobacco: Never Used   • Alcohol use 3.5 - 17.5 oz/week     7 - 35 Glasses of wine per week      Comment: occ - wine     Family History   Problem Relation Age of Onset   • Stroke Mother    • Diabetes Father    • Alcohol/Drug Father      alcholism   • Heart Disease Paternal Grandmother      possible MI     She  has a past medical history of Degenerative joint disease; [place] (hard of hearing); IFG (impaired fasting glucose); Impaired fasting glucose; Meige syndrome (blepharospasm with oromandibular dystonia); Osteoporosis; Recurrent UTI; Risk for falls (10/11/2017); and Vertigo. She also has no past medical history of Diabetes.   Past Surgical History:   Procedure Laterality Date   • TUBAL COAGULATION LAPAROSCOPIC  "BILATERAL  1973         Exam:     Blood pressure 110/70, pulse 83, temperature 36.1 °C (97 °F), height 1.626 m (5' 4\"), weight 60.6 kg (133 lb 9.6 oz), SpO2 98 %. Body mass index is 22.93 kg/m².    Hearing fair.  Left ear deaf  Dentition good  Alert, oriented in no acute distress.  Eye contact is good, speech goal directed, affect calm  Physical Exam   Vitals reviewed.  Constitutional: oriented to person, place, and time. appears well-developed and well-nourished. No distress.   HENT: Head: Normocephalic and atraumatic. Bilateral tympanic membranes wnl w/o bulging.  Right Ear: External ear normal. Left Ear: External ear normal. Nose: Nose normal.  Mouth/Throat: Oropharynx is clear and moist. No oropharyngeal exudate. marlyn tm wnl. Eyes: Conjunctivae and EOM are normal. Pupils are equal, round, and reactive to light. Right eye exhibits no discharge. Left eye exhibits no discharge. No scleral icterus.    Neck: Normal range of motion. Neck supple. No JVD present.   Cardiovascular: Normal rate, regular rhythm, normal heart sounds and intact distal pulses.  Exam reveals no gallop and no friction rub.  No murmur heard.  No carotid bruits   Pulmonary/Chest: Effort normal and breath sounds normal. No stridor. No respiratory distress. no wheezes or rales. exhibits no tenderness.   Abdominal: Soft. Bowel sounds are normal. exhibits no distension and no mass. No tenderness. no rebound and no guarding.   Musculoskeletal: Normal range of motion. exhibits 1+ left pedal edema or tenderness.  marlyn pedal pulses 2+.  Lymphadenopathy:  no cervical or supraclavicular adenopathy.   Neurological: alert and oriented to person, place, and time. has normal reflexes. displays normal reflexes. No cranial nerve deficit. exhibits normal muscle tone. Coordination normal.   Skin: Skin is warm and dry. No rash noted. no diaphoresis. No erythema. No pallor.   Psychiatric: normal mood and affect. behavior is normal.         Assessment and Plan. The " following treatment and monitoring plan is recommended:    1. Vertigo      occas sx only.  no tx needed   2. Risk for falls      will try and inc walking to strengthening.  declines PT referral   3. Recurrent UTI      NO CURRENT SX   4. Localized osteoporosis without current pathological fracture      declines meds.  no fx history   5. Meige syndrome (blepharospasm with oromandibular dystonia)  tizanidine (ZANAFLEX) 4 MG Tab    on valium.  will add tizanidine to see if helps   6. IFG (impaired fasting glucose)  LIPID PROFILE    MICROALBUMIN CREAT RATIO URINE    improved from last check   7. Other specified hearing loss of left ear, unspecified hearing status on contralateral side      left ear deaf.  Habematolel RT ear with tv only.    8. Primary osteoarthritis involving multiple joints      stable w/o tx needed         Services suggested: No services needed at this time  Health Care Screening recommendations as per orders if indicated.  Referrals offered: PT/OT/Nutrition counseling/Behavioral Health/Smoking cessation as per orders if indicated.    Discussion today about general wellness and lifestyle habits:    · Prevent falls and reduce trip hazards; Cautioned about securing or removing rugs.  · Have a working fire alarm and carbon monoxide detector;   · Engage in regular physical activity and social activities       Follow-up: 3 months for lab review

## 2018-02-05 NOTE — ASSESSMENT & PLAN NOTE
No hx of fx.  Her last dexa in 2016 showed osteoporosis.  She is on d and ca++.  Walks for exercise.  She declines med tx at this time.

## 2018-02-05 NOTE — ASSESSMENT & PLAN NOTE
She remains on valium for her facial spasms.  She just read that if she takes antibspasmodic with the valium it will  Help  Will start with daily tizanidine.

## 2018-02-05 NOTE — ASSESSMENT & PLAN NOTE
Pt reports poor balance.  She feels like she will fall sometimes but has not fallen recently.  She feels that her legs are weak.

## 2018-02-07 ENCOUNTER — TELEPHONE (OUTPATIENT)
Dept: MEDICAL GROUP | Facility: MEDICAL CENTER | Age: 83
End: 2018-02-07

## 2018-02-08 NOTE — TELEPHONE ENCOUNTER
Pt states she does NOT  feel confused as a side effect from the medicine, but states she was confused with the medication dosage. She wanted to verify when and how much of the medication to take. Confirmed with pt's chart that she is to take one tablet at every bed time. She states that she understands what the directions.     Pt states that she is having spasms in her face in the morning and she doesn't think the medication is helping for that.     Pt would like to know if she can take tizanadine with valium.

## 2018-02-08 NOTE — TELEPHONE ENCOUNTER
Spoke with pt by phone to review meds and instructions.  Informed she can continue with the tizanidine at bedtime (she found 1/2 tab to be effective enough). May use the valium if needed during the day, just do not take within a few hours of each other. Verbalized understanding.

## 2018-02-08 NOTE — TELEPHONE ENCOUNTER
Pt left message saying she is confused and having balance issues and received new rx she's not sure she can take the whole thing and doesn't know if she should take it with valium. Please call and advise asap.

## 2018-02-08 NOTE — TELEPHONE ENCOUNTER
If this is a new issue and truely having confusion she should be seen in UC or ER.  The tizanidine can cause drowsiness and loss of balance but not confusion.  i would be worried about stroke

## 2018-03-13 ENCOUNTER — OFFICE VISIT (OUTPATIENT)
Dept: MEDICAL GROUP | Facility: MEDICAL CENTER | Age: 83
End: 2018-03-13
Payer: MEDICARE

## 2018-03-13 VITALS
TEMPERATURE: 97.8 F | BODY MASS INDEX: 22.88 KG/M2 | HEART RATE: 77 BPM | DIASTOLIC BLOOD PRESSURE: 66 MMHG | OXYGEN SATURATION: 97 % | WEIGHT: 134 LBS | SYSTOLIC BLOOD PRESSURE: 108 MMHG | HEIGHT: 64 IN

## 2018-03-13 DIAGNOSIS — G51.39 FACIAL SPASM: ICD-10-CM

## 2018-03-13 PROCEDURE — 99213 OFFICE O/P EST LOW 20 MIN: CPT | Performed by: NURSE PRACTITIONER

## 2018-03-25 NOTE — TELEPHONE ENCOUNTER
Was the patient seen in the last year in this department? Yes     Does patient have an active prescription for medications requested? No     Received Request Via: Pharmacy     Last seen: 10/11/2017   No

## 2018-04-22 ENCOUNTER — APPOINTMENT (OUTPATIENT)
Dept: RADIOLOGY | Facility: MEDICAL CENTER | Age: 83
DRG: 480 | End: 2018-04-22
Attending: ORTHOPAEDIC SURGERY
Payer: MEDICARE

## 2018-04-22 ENCOUNTER — HOSPITAL ENCOUNTER (INPATIENT)
Facility: MEDICAL CENTER | Age: 83
LOS: 3 days | DRG: 480 | End: 2018-04-25
Attending: EMERGENCY MEDICINE | Admitting: INTERNAL MEDICINE
Payer: MEDICARE

## 2018-04-22 ENCOUNTER — RESOLUTE PROFESSIONAL BILLING HOSPITAL PROF FEE (OUTPATIENT)
Dept: HOSPITALIST | Facility: MEDICAL CENTER | Age: 83
End: 2018-04-22
Payer: MEDICARE

## 2018-04-22 ENCOUNTER — APPOINTMENT (OUTPATIENT)
Dept: RADIOLOGY | Facility: MEDICAL CENTER | Age: 83
DRG: 480 | End: 2018-04-22
Attending: EMERGENCY MEDICINE
Payer: MEDICARE

## 2018-04-22 DIAGNOSIS — M16.9 OSTEOARTHRITIS OF HIP, UNSPECIFIED LATERALITY, UNSPECIFIED OSTEOARTHRITIS TYPE: ICD-10-CM

## 2018-04-22 DIAGNOSIS — S72.002A CLOSED FRACTURE OF NECK OF LEFT FEMUR, INITIAL ENCOUNTER (HCC): ICD-10-CM

## 2018-04-22 LAB
ANION GAP SERPL CALC-SCNC: 7 MMOL/L (ref 0–11.9)
BASOPHILS # BLD AUTO: 0.6 % (ref 0–1.8)
BASOPHILS # BLD: 0.05 K/UL (ref 0–0.12)
BUN SERPL-MCNC: 18 MG/DL (ref 8–22)
CALCIUM SERPL-MCNC: 9.1 MG/DL (ref 8.4–10.2)
CHLORIDE SERPL-SCNC: 105 MMOL/L (ref 96–112)
CO2 SERPL-SCNC: 24 MMOL/L (ref 20–33)
CREAT SERPL-MCNC: 0.93 MG/DL (ref 0.5–1.4)
EOSINOPHIL # BLD AUTO: 0.08 K/UL (ref 0–0.51)
EOSINOPHIL NFR BLD: 0.9 % (ref 0–6.9)
ERYTHROCYTE [DISTWIDTH] IN BLOOD BY AUTOMATED COUNT: 43.2 FL (ref 35.9–50)
GLUCOSE SERPL-MCNC: 118 MG/DL (ref 65–99)
HCT VFR BLD AUTO: 39.5 % (ref 37–47)
HGB BLD-MCNC: 13.8 G/DL (ref 12–16)
IMM GRANULOCYTES # BLD AUTO: 0.04 K/UL (ref 0–0.11)
IMM GRANULOCYTES NFR BLD AUTO: 0.5 % (ref 0–0.9)
LYMPHOCYTES # BLD AUTO: 1.2 K/UL (ref 1–4.8)
LYMPHOCYTES NFR BLD: 14 % (ref 22–41)
MCH RBC QN AUTO: 32.3 PG (ref 27–33)
MCHC RBC AUTO-ENTMCNC: 34.9 G/DL (ref 33.6–35)
MCV RBC AUTO: 92.5 FL (ref 81.4–97.8)
MONOCYTES # BLD AUTO: 0.5 K/UL (ref 0–0.85)
MONOCYTES NFR BLD AUTO: 5.8 % (ref 0–13.4)
NEUTROPHILS # BLD AUTO: 6.71 K/UL (ref 2–7.15)
NEUTROPHILS NFR BLD: 78.2 % (ref 44–72)
NRBC # BLD AUTO: 0 K/UL
NRBC BLD-RTO: 0 /100 WBC
PLATELET # BLD AUTO: 224 K/UL (ref 164–446)
PMV BLD AUTO: 10.5 FL (ref 9–12.9)
POTASSIUM SERPL-SCNC: 3.8 MMOL/L (ref 3.6–5.5)
RBC # BLD AUTO: 4.27 M/UL (ref 4.2–5.4)
SODIUM SERPL-SCNC: 136 MMOL/L (ref 135–145)
WBC # BLD AUTO: 8.6 K/UL (ref 4.8–10.8)

## 2018-04-22 PROCEDURE — 36415 COLL VENOUS BLD VENIPUNCTURE: CPT

## 2018-04-22 PROCEDURE — 700111 HCHG RX REV CODE 636 W/ 250 OVERRIDE (IP)

## 2018-04-22 PROCEDURE — 160009 HCHG ANES TIME/MIN: Performed by: ORTHOPAEDIC SURGERY

## 2018-04-22 PROCEDURE — 770006 HCHG ROOM/CARE - MED/SURG/GYN SEMI*

## 2018-04-22 PROCEDURE — A6222 GAUZE <=16 IN NO W/SAL W/O B: HCPCS | Performed by: ORTHOPAEDIC SURGERY

## 2018-04-22 PROCEDURE — 71045 X-RAY EXAM CHEST 1 VIEW: CPT

## 2018-04-22 PROCEDURE — 503036 HCHG GUIDE PIN,OIC: Performed by: ORTHOPAEDIC SURGERY

## 2018-04-22 PROCEDURE — 93005 ELECTROCARDIOGRAM TRACING: CPT | Performed by: EMERGENCY MEDICINE

## 2018-04-22 PROCEDURE — 160035 HCHG PACU - 1ST 60 MINS PHASE I: Performed by: ORTHOPAEDIC SURGERY

## 2018-04-22 PROCEDURE — A4450 NON-WATERPROOF TAPE: HCPCS | Performed by: ORTHOPAEDIC SURGERY

## 2018-04-22 PROCEDURE — 160048 HCHG OR STATISTICAL LEVEL 1-5: Performed by: ORTHOPAEDIC SURGERY

## 2018-04-22 PROCEDURE — 72170 X-RAY EXAM OF PELVIS: CPT

## 2018-04-22 PROCEDURE — 73502 X-RAY EXAM HIP UNI 2-3 VIEWS: CPT | Mod: LT

## 2018-04-22 PROCEDURE — 501445 HCHG STAPLER, SKIN DISP: Performed by: ORTHOPAEDIC SURGERY

## 2018-04-22 PROCEDURE — 160002 HCHG RECOVERY MINUTES (STAT): Performed by: ORTHOPAEDIC SURGERY

## 2018-04-22 PROCEDURE — 99291 CRITICAL CARE FIRST HOUR: CPT

## 2018-04-22 PROCEDURE — 73552 X-RAY EXAM OF FEMUR 2/>: CPT | Mod: LT

## 2018-04-22 PROCEDURE — A6402 STERILE GAUZE <= 16 SQ IN: HCPCS | Performed by: ORTHOPAEDIC SURGERY

## 2018-04-22 PROCEDURE — 160029 HCHG SURGERY MINUTES - 1ST 30 MINS LEVEL 4: Performed by: ORTHOPAEDIC SURGERY

## 2018-04-22 PROCEDURE — 99222 1ST HOSP IP/OBS MODERATE 55: CPT | Mod: AI | Performed by: INTERNAL MEDICINE

## 2018-04-22 PROCEDURE — 501838 HCHG SUTURE GENERAL: Performed by: ORTHOPAEDIC SURGERY

## 2018-04-22 PROCEDURE — 700111 HCHG RX REV CODE 636 W/ 250 OVERRIDE (IP): Performed by: EMERGENCY MEDICINE

## 2018-04-22 PROCEDURE — 700102 HCHG RX REV CODE 250 W/ 637 OVERRIDE(OP): Performed by: INTERNAL MEDICINE

## 2018-04-22 PROCEDURE — 700102 HCHG RX REV CODE 250 W/ 637 OVERRIDE(OP)

## 2018-04-22 PROCEDURE — 96374 THER/PROPH/DIAG INJ IV PUSH: CPT

## 2018-04-22 PROCEDURE — 80048 BASIC METABOLIC PNL TOTAL CA: CPT

## 2018-04-22 PROCEDURE — 160041 HCHG SURGERY MINUTES - EA ADDL 1 MIN LEVEL 4: Performed by: ORTHOPAEDIC SURGERY

## 2018-04-22 PROCEDURE — A9270 NON-COVERED ITEM OR SERVICE: HCPCS | Performed by: INTERNAL MEDICINE

## 2018-04-22 PROCEDURE — 0QS734Z REPOSITION LEFT UPPER FEMUR WITH INTERNAL FIXATION DEVICE, PERCUTANEOUS APPROACH: ICD-10-PCS | Performed by: ORTHOPAEDIC SURGERY

## 2018-04-22 PROCEDURE — C1713 ANCHOR/SCREW BN/BN,TIS/BN: HCPCS | Performed by: ORTHOPAEDIC SURGERY

## 2018-04-22 PROCEDURE — 85025 COMPLETE CBC W/AUTO DIFF WBC: CPT

## 2018-04-22 PROCEDURE — A9270 NON-COVERED ITEM OR SERVICE: HCPCS

## 2018-04-22 DEVICE — SCREW CANNULATED 32MM THREAD 7.3MM X 85MM (3TX3=9): Type: IMPLANTABLE DEVICE | Site: HIP | Status: FUNCTIONAL

## 2018-04-22 DEVICE — SCREW CANNULATED 32MM THREAD 7.3MM X 75MM (3TX3=9): Type: IMPLANTABLE DEVICE | Site: HIP | Status: FUNCTIONAL

## 2018-04-22 DEVICE — SCREW CANNULATED 32MM THREAD 7.3MM X 95MM (3TX3=9): Type: IMPLANTABLE DEVICE | Site: HIP | Status: FUNCTIONAL

## 2018-04-22 RX ORDER — MORPHINE SULFATE 4 MG/ML
2 INJECTION, SOLUTION INTRAMUSCULAR; INTRAVENOUS EVERY 4 HOURS PRN
Status: DISCONTINUED | OUTPATIENT
Start: 2018-04-22 | End: 2018-04-25 | Stop reason: HOSPADM

## 2018-04-22 RX ORDER — DIAZEPAM 5 MG/1
2.5 TABLET ORAL DAILY
COMMUNITY
End: 2018-10-30 | Stop reason: SDUPTHER

## 2018-04-22 RX ORDER — MORPHINE SULFATE 4 MG/ML
2 INJECTION, SOLUTION INTRAMUSCULAR; INTRAVENOUS ONCE
Status: COMPLETED | OUTPATIENT
Start: 2018-04-22 | End: 2018-04-22

## 2018-04-22 RX ORDER — OXYCODONE HCL 5 MG/5 ML
SOLUTION, ORAL ORAL
Status: COMPLETED
Start: 2018-04-22 | End: 2018-04-22

## 2018-04-22 RX ORDER — SODIUM CHLORIDE, SODIUM LACTATE, POTASSIUM CHLORIDE, CALCIUM CHLORIDE 600; 310; 30; 20 MG/100ML; MG/100ML; MG/100ML; MG/100ML
1000 INJECTION, SOLUTION INTRAVENOUS ONCE
Status: COMPLETED | OUTPATIENT
Start: 2018-04-22 | End: 2018-04-22

## 2018-04-22 RX ORDER — AMOXICILLIN 250 MG
2 CAPSULE ORAL 2 TIMES DAILY
Status: DISCONTINUED | OUTPATIENT
Start: 2018-04-22 | End: 2018-04-25 | Stop reason: HOSPADM

## 2018-04-22 RX ORDER — ACETAMINOPHEN 325 MG/1
650 TABLET ORAL EVERY 6 HOURS PRN
Status: DISCONTINUED | OUTPATIENT
Start: 2018-04-22 | End: 2018-04-25 | Stop reason: HOSPADM

## 2018-04-22 RX ORDER — HEPARIN SODIUM 5000 [USP'U]/ML
5000 INJECTION, SOLUTION INTRAVENOUS; SUBCUTANEOUS EVERY 8 HOURS
Status: DISCONTINUED | OUTPATIENT
Start: 2018-04-22 | End: 2018-04-22

## 2018-04-22 RX ORDER — SODIUM CHLORIDE 9 MG/ML
INJECTION, SOLUTION INTRAVENOUS CONTINUOUS
Status: DISCONTINUED | OUTPATIENT
Start: 2018-04-22 | End: 2018-04-25 | Stop reason: HOSPADM

## 2018-04-22 RX ORDER — ONDANSETRON 2 MG/ML
4 INJECTION INTRAMUSCULAR; INTRAVENOUS EVERY 4 HOURS PRN
Status: DISCONTINUED | OUTPATIENT
Start: 2018-04-22 | End: 2018-04-25 | Stop reason: HOSPADM

## 2018-04-22 RX ORDER — ONDANSETRON 4 MG/1
4 TABLET, ORALLY DISINTEGRATING ORAL EVERY 4 HOURS PRN
Status: DISCONTINUED | OUTPATIENT
Start: 2018-04-22 | End: 2018-04-25 | Stop reason: HOSPADM

## 2018-04-22 RX ORDER — POLYETHYLENE GLYCOL 3350 17 G/17G
1 POWDER, FOR SOLUTION ORAL
Status: DISCONTINUED | OUTPATIENT
Start: 2018-04-22 | End: 2018-04-25 | Stop reason: HOSPADM

## 2018-04-22 RX ORDER — BISACODYL 10 MG
10 SUPPOSITORY, RECTAL RECTAL
Status: DISCONTINUED | OUTPATIENT
Start: 2018-04-22 | End: 2018-04-25 | Stop reason: HOSPADM

## 2018-04-22 RX ADMIN — CEFAZOLIN 2 G: 10 INJECTION, POWDER, FOR SOLUTION INTRAVENOUS at 22:51

## 2018-04-22 RX ADMIN — SODIUM CHLORIDE, SODIUM LACTATE, POTASSIUM CHLORIDE, CALCIUM CHLORIDE 1000 ML: 600; 310; 30; 20 INJECTION, SOLUTION INTRAVENOUS at 16:00

## 2018-04-22 RX ADMIN — ACETAMINOPHEN 650 MG: 325 TABLET, FILM COATED ORAL at 22:02

## 2018-04-22 RX ADMIN — OXYCODONE HYDROCHLORIDE 5 MG: 5 SOLUTION ORAL at 17:15

## 2018-04-22 RX ADMIN — FENTANYL CITRATE 50 MCG: 50 INJECTION, SOLUTION INTRAMUSCULAR; INTRAVENOUS at 17:25

## 2018-04-22 RX ADMIN — MORPHINE SULFATE 2 MG: 4 INJECTION INTRAVENOUS at 13:21

## 2018-04-22 RX ADMIN — SODIUM CHLORIDE: 9 INJECTION, SOLUTION INTRAVENOUS at 17:40

## 2018-04-22 ASSESSMENT — ENCOUNTER SYMPTOMS
BLURRED VISION: 0
NERVOUS/ANXIOUS: 0
STRIDOR: 0
DIARRHEA: 0
MYALGIAS: 0
DEPRESSION: 0
FALLS: 1
FOCAL WEAKNESS: 0
INSOMNIA: 0
EYE REDNESS: 0
EYE DISCHARGE: 0
SPUTUM PRODUCTION: 0
NECK PAIN: 0
FEVER: 0
EYE PAIN: 0
BACK PAIN: 0
SHORTNESS OF BREATH: 0
HEARTBURN: 0
NAUSEA: 0
SEIZURES: 0
HEADACHES: 0
ABDOMINAL PAIN: 0
ORTHOPNEA: 0
WEIGHT LOSS: 0
VOMITING: 0
CHILLS: 0
DIZZINESS: 0
COUGH: 0
PALPITATIONS: 0

## 2018-04-22 ASSESSMENT — PAIN SCALES - GENERAL
PAINLEVEL_OUTOF10: ASSUMED PAIN PRESENT
PAINLEVEL_OUTOF10: 3
PAINLEVEL_OUTOF10: 3
PAINLEVEL_OUTOF10: ASSUMED PAIN PRESENT
PAINLEVEL_OUTOF10: 0
PAINLEVEL_OUTOF10: ASSUMED PAIN PRESENT
PAINLEVEL_OUTOF10: 3
PAINLEVEL_OUTOF10: 0
PAINLEVEL_OUTOF10: ASSUMED PAIN PRESENT

## 2018-04-22 ASSESSMENT — LIFESTYLE VARIABLES
CONSUMPTION TOTAL: NEGATIVE
AVERAGE NUMBER OF DAYS PER WEEK YOU HAVE A DRINK CONTAINING ALCOHOL: 4
HOW MANY TIMES IN THE PAST YEAR HAVE YOU HAD 5 OR MORE DRINKS IN A DAY: 0
EVER HAD A DRINK FIRST THING IN THE MORNING TO STEADY YOUR NERVES TO GET RID OF A HANGOVER: NO
TOTAL SCORE: 0
EVER_SMOKED: YES
HAVE YOU EVER FELT YOU SHOULD CUT DOWN ON YOUR DRINKING: NO
EVER FELT BAD OR GUILTY ABOUT YOUR DRINKING: NO
TOTAL SCORE: 0
ON A TYPICAL DAY WHEN YOU DRINK ALCOHOL HOW MANY DRINKS DO YOU HAVE: 1
ALCOHOL_USE: YES
HAVE PEOPLE ANNOYED YOU BY CRITICIZING YOUR DRINKING: NO
TOTAL SCORE: 0

## 2018-04-22 NOTE — CONSULTS
Date of Service:  4/22/2018    PCP: MARIUSZ Maria    CC:  Left hip pain    HPI: This is a 83 y.o. female who sustained a mechanical fall over a cat scratching post earlier the day of presentation.  She lost her balance and fell onto her left hip.  She had immediate severe pain and inability to weightbear afterwards.  At baseline, she lives independently.  She ambulates without assistive device.    ROS: As above. The remainder of a complete review of systems is negative in all systems except as noted.    PMHx:  Active Ambulatory Problems     Diagnosis Date Noted   • [place] (hard of hearing)    • Preventative health care 11/10/2009   • Vertigo 03/20/2012   • Recurrent UTI    • Meige syndrome (blepharospasm with oromandibular dystonia)    • Degenerative joint disease    • Impaired fasting glucose    • Osteoporosis    • Risk for falls 10/11/2017   • IFG (impaired fasting glucose)      Resolved Ambulatory Problems     Diagnosis Date Noted   • No Resolved Ambulatory Problems     Past Medical History:   Diagnosis Date   • Degenerative joint disease    • [place] (hard of hearing)    • IFG (impaired fasting glucose)    • Impaired fasting glucose    • Meige syndrome (blepharospasm with oromandibular dystonia)    • Osteoporosis    • Recurrent UTI    • Risk for falls 10/11/2017   • Vertigo        SHx:  Social History     Social History   • Marital status:      Spouse name: N/A   • Number of children: N/A   • Years of education: N/A     Occupational History   • Not on file.     Social History Main Topics   • Smoking status: Former Smoker     Packs/day: 1.50     Years: 45.00     Types: Cigarettes     Quit date: 1/1/2004   • Smokeless tobacco: Never Used   • Alcohol use 3.5 - 17.5 oz/week     7 - 35 Glasses of wine per week   • Drug use: Yes      Comment: CBD oil   • Sexual activity: Not on file     Other Topics Concern   • Not on file     Social History Narrative   • No narrative on file       FHx:  family history includes  "Alcohol/Drug in her father; Diabetes in her father; Heart Disease in her paternal grandmother; Stroke in her mother.    Allergies:  Allergies   Allergen Reactions   • Nkda [No Known Drug Allergy]    • Other Environmental Runny Nose and Itching     pollens       Medications:  No current facility-administered medications on file prior to encounter.      Current Outpatient Prescriptions on File Prior to Encounter   Medication Sig Dispense Refill   • famotidine (PEPCID) 20 MG TABS Take 20 mg by mouth PRN.     • MULTIVITAL PO Take 1 Tab by mouth every day.     • CALTRATE 600+D PO Take 1 Tab by mouth every day.         Objective Exam:  Vitals:    04/22/18 1306 04/22/18 1327 04/22/18 1403 04/22/18 1442   Pulse:  78 71 74   SpO2:  97% 97% 97%   Weight: 68.2 kg (150 lb 7.4 oz)      Height: 1.626 m (5' 4\")          General: alert and oriented, conversant.  HEENT: atraumatic, neck supple, mucous membranes pink and moist  Chest: nonlabored breathing, no audible wheezing  CV: regular rate, rhythm  Abd: soft, nontender  Ext: left lower extremity - increased pain with any attempts at hip ROM.  Able to flex/extend ankle, toes.  Foot warm, well-perfused.  Neuro: sensation preserved over foot  Skin: intact    Laboratory--reviewed personally and are as follows:  Lab Results   Component Value Date/Time    WBC 8.6 04/22/2018 01:15 PM    WBC 5.5 06/08/2011 09:08 AM    RBC 4.27 04/22/2018 01:15 PM    RBC 4.44 06/08/2011 09:08 AM    HEMOGLOBIN 13.8 04/22/2018 01:15 PM    HEMATOCRIT 39.5 04/22/2018 01:15 PM    MCV 92.5 04/22/2018 01:15 PM    MCV 93 06/08/2011 09:08 AM    MCH 32.3 04/22/2018 01:15 PM    MCH 31.8 06/08/2011 09:08 AM    MCHC 34.9 04/22/2018 01:15 PM    MPV 10.5 04/22/2018 01:15 PM    NEUTSPOLYS 78.20 (H) 04/22/2018 01:15 PM    LYMPHOCYTES 14.00 (L) 04/22/2018 01:15 PM    MONOCYTES 5.80 04/22/2018 01:15 PM    EOSINOPHILS 0.90 04/22/2018 01:15 PM    BASOPHILS 0.60 04/22/2018 01:15 PM      Lab Results   Component Value Date/Time "    SODIUM 136 04/22/2018 01:15 PM    POTASSIUM 3.8 04/22/2018 01:15 PM    CHLORIDE 105 04/22/2018 01:15 PM    CO2 24 04/22/2018 01:15 PM    GLUCOSE 118 (H) 04/22/2018 01:15 PM    BUN 18 04/22/2018 01:15 PM    CREATININE 0.93 04/22/2018 01:15 PM    CREATININE 0.88 11/28/2012 10:01 AM    BUNCREATRAT 11 03/02/2016 09:16 AM    BUNCREATRAT 15 11/28/2012 10:01 AM    GLOMRATE >59 11/19/2010 11:05 AM      Lab Results   Component Value Date/Time    PROTHROMBTM 13.0 03/20/2012 04:30 AM    INR 0.97 03/20/2012 04:30 AM        Radiology  Left hip radiographs - impacted femoral neck fracture    Assessment:  Left valgus-impacted femoral neck fracture    Plan:  - Treatment options discussed.  We recommend operative fixation with percutaneous screws to improve pain and mobility.  Non-operative management reviewed - this would require nonweightbearing x6 weeks and a risk of displacement of at least 20%, which would require hemiarthroplasty if this were to happen.  Risk and benefits discussed.  Patient agrees to proceed.  Please keep NPO.  Bedrest for now.  Appreciate hospitalist evaluation and optimization.

## 2018-04-22 NOTE — OR NURSING
"1659: To PACU from OR via bed, sleeping, respirations spontaneous and non-labored. Icepack applied over c/d/i L hip surgical dressings. L DP+2.  1710: Rouses spontaneously, c/o \"a little\" pain - unable to rate on numerical scale, plan po analgesia, restless NEW and slightly tremulous at the same level noted pre-op. Removes own O2 so trial of RA commenced.  1725: medicated IV for increasing L hip pain and repositioned on R side.   1735: Remains awake and c/o pain, O2 resumed for SpO2 dropping in to 80's on RA post analgesia.   1740: Pt states pain\" just a little\" and \"tolerable\"  1753: No change in surgical site assessment.Meets criteria to transfer to [facility]      "

## 2018-04-22 NOTE — OR SURGEON
Immediate Post OP Note    PreOp Diagnosis: left valgus impacted femoral neck fracture    PostOp Diagnosis: same    Procedure(s):  HIP CANNULATED SCREW - Wound Class: Clean    Surgeon(s):  ERIK Kerr M.D.    Anesthesiologist/Type of Anesthesia:  Anesthesiologist: Allen Kraft M.D./General    Surgical Staff:  Circulator: Aziza Capone R.N.  Scrub Person: Derek Clark  Radiology Technologist: Libra Estrella    Specimens removed if any:  * No specimens in log *    Estimated Blood Loss: minimal    Findings: see dictated operative note    Complications: none immediate    Post-op plan:  - WBAT LLE  - Lovenox starting tomorrow AM  - Ancef x24 hours  - PT/OT eval in AM      4/22/2018 4:57 PM Junior Lo M.D.

## 2018-04-22 NOTE — ED NOTES
"Chief Complaint   Patient presents with   • Hip Injury     Left hip, trip and fell over cat post     Ht 1.626 m (5' 4\")   Wt 68.2 kg (150 lb 7.4 oz)   BMI 25.83 kg/m²     "

## 2018-04-22 NOTE — H&P
Hospital Medicine History and Physical      Date of Service  4/22/2018    Chief Complaint  Chief Complaint   Patient presents with   • Hip Injury     Left hip, trip and fell over cat post       History of Presenting Illness  Tonny is a 83 y.o. female no significant PMH, who presents with left hip pain after she trip and fell over cat post. After that she had severe pain in her left hip and she was brought here. Denies dizziness or syncope episode. In the ER she was found to have left hip fracture. Ortho was consulted and plan to do surgery later today.    Primary Care Physician  ABIMBOLA Maria.      Code Status  Full code    Review of Systems  Review of Systems   Constitutional: Negative for chills, fever and weight loss.   HENT: Negative for congestion and nosebleeds.    Eyes: Negative for blurred vision, pain, discharge and redness.   Respiratory: Negative for cough, sputum production, shortness of breath and stridor.    Cardiovascular: Negative for chest pain, palpitations and orthopnea.   Gastrointestinal: Negative for abdominal pain, diarrhea, heartburn, nausea and vomiting.   Genitourinary: Negative for dysuria, frequency and urgency.   Musculoskeletal: Positive for falls and joint pain. Negative for back pain, myalgias and neck pain.   Skin: Negative for itching and rash.   Neurological: Negative for dizziness, focal weakness, seizures and headaches.   Psychiatric/Behavioral: Negative for depression. The patient is not nervous/anxious and does not have insomnia.      Please see HPI, all other systems were reviewed and are negative (AMA/CMS criteria)     Past Medical History  Past Medical History:   Diagnosis Date   • Risk for falls 10/11/2017   • Degenerative joint disease     lbp and neck pain   • [place] (hard of hearing)     rt ear with hearing aide   • IFG (impaired fasting glucose)    • Impaired fasting glucose    • Meige syndrome (blepharospasm with oromandibular dystonia)    • Osteoporosis    •  Recurrent UTI    • Vertigo        Surgical History  Past Surgical History:   Procedure Laterality Date   • TUBAL COAGULATION LAPAROSCOPIC BILATERAL  1973       Medications  No current facility-administered medications on file prior to encounter.      Current Outpatient Prescriptions on File Prior to Encounter   Medication Sig Dispense Refill   • famotidine (PEPCID) 20 MG TABS Take 20 mg by mouth PRN.     • MULTIVITAL PO Take 1 Tab by mouth every day.     • CALTRATE 600+D PO Take 1 Tab by mouth every day.       Family History  Family History   Problem Relation Age of Onset   • Stroke Mother    • Diabetes Father    • Alcohol/Drug Father      alcholism   • Heart Disease Paternal Grandmother      possible MI         Social History  Social History   Substance Use Topics   • Smoking status: Former Smoker     Packs/day: 1.50     Years: 45.00     Types: Cigarettes     Quit date: 2004   • Smokeless tobacco: Never Used   • Alcohol use 3.5 - 17.5 oz/week     7 - 35 Glasses of wine per week       Allergies  Allergies   Allergen Reactions   • Nkda [No Known Drug Allergy]    • Other Environmental Runny Nose and Itching     pollens        Physical Exam  Laboratory   Hemodynamics  No data recorded.      Pulse  Av.5  Min: 71  Max: 78    NIBP: 108/57      Respiratory      Pulse Oximetry: 97 %             Physical Exam   Constitutional: She is oriented to person, place, and time. No distress.   HENT:   Head: Normocephalic and atraumatic.   Mouth/Throat: Oropharynx is clear and moist.   Eyes: Conjunctivae and EOM are normal. Pupils are equal, round, and reactive to light.   Neck: Normal range of motion. Neck supple. No tracheal deviation present. No thyromegaly present.   Cardiovascular: Normal rate and regular rhythm.    No murmur heard.  Pulmonary/Chest: Effort normal and breath sounds normal. No respiratory distress. She has no wheezes.   Abdominal: Soft. Bowel sounds are normal. She exhibits no distension. There is no  tenderness.   Musculoskeletal: She exhibits tenderness. She exhibits no edema.   Left hip pain   Neurological: She is alert and oriented to person, place, and time. No cranial nerve deficit.   Skin: Skin is warm and dry. She is not diaphoretic. No erythema.   Psychiatric: She has a normal mood and affect. Her behavior is normal. Thought content normal.       Recent Labs      04/22/18   1315   WBC  8.6   RBC  4.27   HEMOGLOBIN  13.8   HEMATOCRIT  39.5   MCV  92.5   MCH  32.3   MCHC  34.9   RDW  43.2   PLATELETCT  224   MPV  10.5     Recent Labs      04/22/18   1315   SODIUM  136   POTASSIUM  3.8   CHLORIDE  105   CO2  24   GLUCOSE  118*   BUN  18   CREATININE  0.93   CALCIUM  9.1     Recent Labs      04/22/18   1315   GLUCOSE  118*                 Lab Results   Component Value Date    TROPONINI <0.01 08/21/2012       Imaging  DX-CHEST-PORTABLE (1 VIEW)   Final Result         No acute cardiac or pulmonary abnormality is identified.      DX-FEMUR-2+ LEFT   Final Result      1.  Impacted subcapital mildly comminuted left femoral neck fracture is identified.      DX-PELVIS-1 OR 2 VIEWS   Final Result      Impacted fracture of the left femoral neck.      Degenerative changes of the hips, right greater than left.      Degenerative changes in the visualized lower lumbar spine and sacroiliac joints.                Assessment/Plan     I anticipate this patient will require at least two midnights for appropriate medical management, necessitating inpatient admission.    Closed left hip fracture (CMS-HCC)- (present on admission)   Assessment & Plan    NPO  Revised cardiac risk score: low risk for major cardiac event class I  Ortho on and plan to do surgery later today  Pain control  Fall precaution  PT/OT  Likely will need SNF        IFG (impaired fasting glucose)- (present on admission)   Assessment & Plan    No history of DM            Prophylaxis:  sc heparin

## 2018-04-22 NOTE — ED NOTES
Med rec updated and complete  Allergies reviewed  Interviewed pt with family at bedside with permission from pt.  Pt reports no antibiotics in the last 30 days.

## 2018-04-22 NOTE — ED PROVIDER NOTES
ED Provider Note  CHIEF COMPLAINT  Chief Complaint   Patient presents with   • Hip Injury     Left hip, trip and fell over cat post       HPI  Ang Lancaster is a 83 y.o. female who presents to the Emergency Department with a chief complaint of left hip pain after falling over a cat scratch stand. The patient does state she has some gait instability at baseline. She did not feel dizzy or lose consciousness hit her head have chest pain or any other prodromal symptoms at the time of the fall. She is complaining of pain localized to her left hip        REVIEW OF SYSTEMS  Positive for left hip pain,, Negative for head trauma loss of consciousness skin tear.  As above all other systems are negative.    PAST MEDICAL HISTORY   has a past medical history of Degenerative joint disease; [place] (hard of hearing); IFG (impaired fasting glucose); Impaired fasting glucose; Meige syndrome (blepharospasm with oromandibular dystonia); Osteoporosis; Recurrent UTI; Risk for falls (10/11/2017); and Vertigo. She also has no past medical history of Diabetes.    FAMILY HISTORY  Family History   Problem Relation Age of Onset   • Stroke Mother    • Diabetes Father    • Alcohol/Drug Father      alcholism   • Heart Disease Paternal Grandmother      possible MI        SOCIAL HISTORY  Social History     Social History Main Topics   • Smoking status: Former Smoker     Packs/day: 1.50     Years: 45.00     Types: Cigarettes     Quit date: 1/1/2004   • Smokeless tobacco: Never Used   • Alcohol use 3.5 - 17.5 oz/week     7 - 35 Glasses of wine per week   • Drug use: Yes      Comment: CBD oil   • Sexual activity: Not on file       SURGICAL HISTORY   has a past surgical history that includes tubal coagulation laparoscopic bilateral (1973).    CURRENT MEDICATIONS  Reviewed.  See Encounter Summary.  Include   Current Facility-Administered Medications:   •  senna-docusate (PERICOLACE or SENOKOT S) 8.6-50 MG per tablet 2 Tab, 2 Tab, Oral, BID **AND**  "polyethylene glycol/lytes (MIRALAX) PACKET 1 Packet, 1 Packet, Oral, QDAY PRN **AND** magnesium hydroxide (MILK OF MAGNESIA) suspension 30 mL, 30 mL, Oral, QDAY PRN **AND** bisacodyl (DULCOLAX) suppository 10 mg, 10 mg, Rectal, QDAY PRN, Tad Jenkins M.D.  •  NS infusion, , Intravenous, Continuous, Tad Jenkins M.D.  •  heparin injection 5,000 Units, 5,000 Units, Subcutaneous, Q8HRS, Tad Jenkins M.D.  •  acetaminophen (TYLENOL) tablet 650 mg, 650 mg, Oral, Q6HRS PRN, Tad Jenkins M.D.  •  ondansetron (ZOFRAN) syringe/vial injection 4 mg, 4 mg, Intravenous, Q4HRS PRN, Tad Jenkins M.D.  •  ondansetron (ZOFRAN ODT) dispertab 4 mg, 4 mg, Oral, Q4HRS PRN, Tad Jenkins M.D.  •  morphine (pf) 4 mg/ml injection 2 mg, 2 mg, Intravenous, Q4HRS PRN, Tad Jenkins M.D.    Current Outpatient Prescriptions:   •  diazePAM (VALIUM) 5 MG Tab, Take 2.5 mg by mouth every day., Disp: , Rfl:   •  famotidine (PEPCID) 20 MG TABS, Take 20 mg by mouth PRN., Disp: , Rfl:   •  MULTIVITAL PO, Take 1 Tab by mouth every day., Disp: , Rfl:   •  CALTRATE 600+D PO, Take 1 Tab by mouth every day., Disp: , Rfl:       ALLERGIES  Allergies   Allergen Reactions   • Nkda [No Known Drug Allergy]    • Other Environmental Runny Nose and Itching     pollens       PHYSICAL EXAM  VITAL SIGNS: Pulse 74   Ht 1.626 m (5' 4\")   Wt 68.2 kg (150 lb 7.4 oz)   SpO2 97%   BMI 25.83 kg/m²   Constitutional: Alert able to answer questions  HENT: Nose is normal in appearance, external ears are normal,  moist mucous membranes  Eyes: Anicteric,  pupils are equal round and reactive, there is no conjunctival drainage or pallor   Neck: The trachea is midline, there is no obvious mass or meningeal signs  Cardiovascular: Good perfusion,  regular rate and rhythm without murmurs gallops or rubs  Thorax & Lungs: Respiratory rate and effort are normal. There is normal chest excursion with respiration.  No wheezes rhonchi or rales noted.  Abdomen: Abdomen is normal in appearance, no " gross peritoneal signs  normal bowel sounds, no pain with cough  :   No CVA tenderness to palpation  Musculoskeletal: Patient is holding left leg still, she has pain with internal and external rotation of her foot, good dorsalis pedis pulse  Skin: Visualized skin is warm without rash.  Neurologic:  Cranial nerves II through XII are intact there is no focal abnormality noted.  Psychiatric: Normal mood and mentation    RADIOLOGY/PROCEDURES  Imaging Studies:    DX-CHEST-PORTABLE (1 VIEW)   Final Result         No acute cardiac or pulmonary abnormality is identified.      DX-FEMUR-2+ LEFT   Final Result      1.  Impacted subcapital mildly comminuted left femoral neck fracture is identified.      DX-PELVIS-1 OR 2 VIEWS   Final Result      Impacted fracture of the left femoral neck.      Degenerative changes of the hips, right greater than left.      Degenerative changes in the visualized lower lumbar spine and sacroiliac joints.               Pertinent Labs   Results for orders placed or performed during the hospital encounter of 04/22/18   CBC w/ Differential   Result Value Ref Range    WBC 8.6 4.8 - 10.8 K/uL    RBC 4.27 4.20 - 5.40 M/uL    Hemoglobin 13.8 12.0 - 16.0 g/dL    Hematocrit 39.5 37.0 - 47.0 %    MCV 92.5 81.4 - 97.8 fL    MCH 32.3 27.0 - 33.0 pg    MCHC 34.9 33.6 - 35.0 g/dL    RDW 43.2 35.9 - 50.0 fL    Platelet Count 224 164 - 446 K/uL    MPV 10.5 9.0 - 12.9 fL    Neutrophils-Polys 78.20 (H) 44.00 - 72.00 %    Lymphocytes 14.00 (L) 22.00 - 41.00 %    Monocytes 5.80 0.00 - 13.40 %    Eosinophils 0.90 0.00 - 6.90 %    Basophils 0.60 0.00 - 1.80 %    Immature Granulocytes 0.50 0.00 - 0.90 %    Nucleated RBC 0.00 /100 WBC    Neutrophils (Absolute) 6.71 2.00 - 7.15 K/uL    Lymphs (Absolute) 1.20 1.00 - 4.80 K/uL    Monos (Absolute) 0.50 0.00 - 0.85 K/uL    Eos (Absolute) 0.08 0.00 - 0.51 K/uL    Baso (Absolute) 0.05 0.00 - 0.12 K/uL    Immature Granulocytes (abs) 0.04 0.00 - 0.11 K/uL    NRBC (Absolute)  0.00 K/uL   Basic Metabolic Panel (BMP)   Result Value Ref Range    Sodium 136 135 - 145 mmol/L    Potassium 3.8 3.6 - 5.5 mmol/L    Chloride 105 96 - 112 mmol/L    Co2 24 20 - 33 mmol/L    Glucose 118 (H) 65 - 99 mg/dL    Bun 18 8 - 22 mg/dL    Creatinine 0.93 0.50 - 1.40 mg/dL    Calcium 9.1 8.4 - 10.2 mg/dL    Anion Gap 7.0 0.0 - 11.9   ESTIMATED GFR   Result Value Ref Range    GFR If African American >60 >60 mL/min/1.73 m 2    GFR If Non African American 58 (A) >60 mL/min/1.73 m 2               COURSE & MEDICAL DECISION MAKING  Nursing notes and vital signs were reviewed. (See chart for details)  The patients  records were reviewed, history was obtained from the patient;     The patient presents with left hip pain, and the differential diagnosis includes but is not limited to left femoral neck fracture, hip dislocation, contusion.       Initial orders in the Emergency Department included x-ray of hip chest x-ray preoperative lab secondary to high clinical suspicion and initial treatment in the Emergency Department included nothing by mouth status and the patient received IV Hep-Lock    ED testing reveals impacted left femoral neck fracture, she will be taken to the OR today by Dr. Alessandra Dias orthopedics and admitted by Dr. Jenkins    FINAL IMPRESSION  1. Impacted left femoral neck fracture  2.        DISPOSITION  Admit          Electronically signed by: Mary Zamora, 4/22/2018 3:04 PM

## 2018-04-23 PROBLEM — G93.41 METABOLIC ENCEPHALOPATHY: Status: ACTIVE | Noted: 2018-04-23

## 2018-04-23 PROBLEM — E87.1 HYPONATREMIA: Status: ACTIVE | Noted: 2018-04-23

## 2018-04-23 LAB
ANION GAP SERPL CALC-SCNC: 4 MMOL/L (ref 0–11.9)
BUN SERPL-MCNC: 16 MG/DL (ref 8–22)
CALCIUM SERPL-MCNC: 8.2 MG/DL (ref 8.4–10.2)
CHLORIDE SERPL-SCNC: 104 MMOL/L (ref 96–112)
CO2 SERPL-SCNC: 25 MMOL/L (ref 20–33)
CREAT SERPL-MCNC: 0.81 MG/DL (ref 0.5–1.4)
ERYTHROCYTE [DISTWIDTH] IN BLOOD BY AUTOMATED COUNT: 41.9 FL (ref 35.9–50)
GLUCOSE BLD-MCNC: 108 MG/DL (ref 65–99)
GLUCOSE SERPL-MCNC: 165 MG/DL (ref 65–99)
HCT VFR BLD AUTO: 35.6 % (ref 37–47)
HGB BLD-MCNC: 12.4 G/DL (ref 12–16)
MCH RBC QN AUTO: 32.1 PG (ref 27–33)
MCHC RBC AUTO-ENTMCNC: 34.8 G/DL (ref 33.6–35)
MCV RBC AUTO: 92.2 FL (ref 81.4–97.8)
PLATELET # BLD AUTO: 208 K/UL (ref 164–446)
PMV BLD AUTO: 10.8 FL (ref 9–12.9)
POTASSIUM SERPL-SCNC: 3.8 MMOL/L (ref 3.6–5.5)
RBC # BLD AUTO: 3.86 M/UL (ref 4.2–5.4)
SODIUM SERPL-SCNC: 133 MMOL/L (ref 135–145)
WBC # BLD AUTO: 8.9 K/UL (ref 4.8–10.8)

## 2018-04-23 PROCEDURE — 82962 GLUCOSE BLOOD TEST: CPT

## 2018-04-23 PROCEDURE — 97162 PT EVAL MOD COMPLEX 30 MIN: CPT

## 2018-04-23 PROCEDURE — 85027 COMPLETE CBC AUTOMATED: CPT

## 2018-04-23 PROCEDURE — 99233 SBSQ HOSP IP/OBS HIGH 50: CPT | Performed by: HOSPITALIST

## 2018-04-23 PROCEDURE — 700111 HCHG RX REV CODE 636 W/ 250 OVERRIDE (IP): Performed by: ORTHOPAEDIC SURGERY

## 2018-04-23 PROCEDURE — 700105 HCHG RX REV CODE 258: Performed by: INTERNAL MEDICINE

## 2018-04-23 PROCEDURE — G8987 SELF CARE CURRENT STATUS: HCPCS | Mod: CJ

## 2018-04-23 PROCEDURE — 700111 HCHG RX REV CODE 636 W/ 250 OVERRIDE (IP): Performed by: INTERNAL MEDICINE

## 2018-04-23 PROCEDURE — 80048 BASIC METABOLIC PNL TOTAL CA: CPT

## 2018-04-23 PROCEDURE — A9270 NON-COVERED ITEM OR SERVICE: HCPCS | Performed by: HOSPITALIST

## 2018-04-23 PROCEDURE — 700111 HCHG RX REV CODE 636 W/ 250 OVERRIDE (IP): Performed by: HOSPITALIST

## 2018-04-23 PROCEDURE — G8978 MOBILITY CURRENT STATUS: HCPCS | Mod: CJ

## 2018-04-23 PROCEDURE — G8979 MOBILITY GOAL STATUS: HCPCS | Mod: CI

## 2018-04-23 PROCEDURE — 700102 HCHG RX REV CODE 250 W/ 637 OVERRIDE(OP): Performed by: INTERNAL MEDICINE

## 2018-04-23 PROCEDURE — 770006 HCHG ROOM/CARE - MED/SURG/GYN SEMI*

## 2018-04-23 PROCEDURE — 36415 COLL VENOUS BLD VENIPUNCTURE: CPT

## 2018-04-23 PROCEDURE — A9270 NON-COVERED ITEM OR SERVICE: HCPCS | Performed by: INTERNAL MEDICINE

## 2018-04-23 PROCEDURE — G8988 SELF CARE GOAL STATUS: HCPCS | Mod: CI

## 2018-04-23 PROCEDURE — 700102 HCHG RX REV CODE 250 W/ 637 OVERRIDE(OP): Performed by: HOSPITALIST

## 2018-04-23 PROCEDURE — 97166 OT EVAL MOD COMPLEX 45 MIN: CPT

## 2018-04-23 PROCEDURE — 700111 HCHG RX REV CODE 636 W/ 250 OVERRIDE (IP)

## 2018-04-23 RX ORDER — RISPERIDONE 1 MG/1
0.5 TABLET ORAL 2 TIMES DAILY
Status: DISCONTINUED | OUTPATIENT
Start: 2018-04-23 | End: 2018-04-25 | Stop reason: HOSPADM

## 2018-04-23 RX ORDER — KETOROLAC TROMETHAMINE 30 MG/ML
30 INJECTION, SOLUTION INTRAMUSCULAR; INTRAVENOUS EVERY 6 HOURS PRN
Status: DISCONTINUED | OUTPATIENT
Start: 2018-04-23 | End: 2018-04-23

## 2018-04-23 RX ORDER — KETOROLAC TROMETHAMINE 30 MG/ML
15 INJECTION, SOLUTION INTRAMUSCULAR; INTRAVENOUS EVERY 6 HOURS PRN
Status: DISCONTINUED | OUTPATIENT
Start: 2018-04-23 | End: 2018-04-25 | Stop reason: HOSPADM

## 2018-04-23 RX ORDER — DIAZEPAM 5 MG/1
2.5 TABLET ORAL EVERY 6 HOURS PRN
Status: DISCONTINUED | OUTPATIENT
Start: 2018-04-23 | End: 2018-04-25 | Stop reason: HOSPADM

## 2018-04-23 RX ORDER — DIAZEPAM 5 MG/1
2.5 TABLET ORAL DAILY
Status: DISCONTINUED | OUTPATIENT
Start: 2018-04-23 | End: 2018-04-23

## 2018-04-23 RX ORDER — HALOPERIDOL 5 MG/ML
2 INJECTION INTRAMUSCULAR ONCE
Status: COMPLETED | OUTPATIENT
Start: 2018-04-23 | End: 2018-04-23

## 2018-04-23 RX ORDER — DEXTROSE MONOHYDRATE 25 G/50ML
25 INJECTION, SOLUTION INTRAVENOUS
Status: DISCONTINUED | OUTPATIENT
Start: 2018-04-23 | End: 2018-04-24

## 2018-04-23 RX ORDER — FAMOTIDINE 20 MG/1
20 TABLET, FILM COATED ORAL DAILY
Status: DISCONTINUED | OUTPATIENT
Start: 2018-04-23 | End: 2018-04-25 | Stop reason: HOSPADM

## 2018-04-23 RX ADMIN — DIAZEPAM 2.5 MG: 5 TABLET ORAL at 15:29

## 2018-04-23 RX ADMIN — ENOXAPARIN SODIUM 40 MG: 100 INJECTION SUBCUTANEOUS at 08:06

## 2018-04-23 RX ADMIN — CEFAZOLIN 2 G: 10 INJECTION, POWDER, FOR SOLUTION INTRAVENOUS at 05:56

## 2018-04-23 RX ADMIN — SODIUM CHLORIDE: 9 INJECTION, SOLUTION INTRAVENOUS at 09:23

## 2018-04-23 RX ADMIN — RISPERIDONE 0.5 MG: 1 TABLET ORAL at 14:07

## 2018-04-23 RX ADMIN — FAMOTIDINE 20 MG: 20 TABLET, FILM COATED ORAL at 14:07

## 2018-04-23 RX ADMIN — THERA TABS 1 TABLET: TAB at 14:07

## 2018-04-23 RX ADMIN — ACETAMINOPHEN 650 MG: 325 TABLET, FILM COATED ORAL at 14:07

## 2018-04-23 RX ADMIN — KETOROLAC TROMETHAMINE 15 MG: 30 INJECTION, SOLUTION INTRAMUSCULAR; INTRAVENOUS at 17:59

## 2018-04-23 RX ADMIN — HALOPERIDOL LACTATE 2 MG: 5 INJECTION, SOLUTION INTRAMUSCULAR at 20:56

## 2018-04-23 ASSESSMENT — ENCOUNTER SYMPTOMS
VOMITING: 0
COUGH: 0
DEPRESSION: 0
SHORTNESS OF BREATH: 0
CHILLS: 0
DIZZINESS: 0
FEVER: 0
INSOMNIA: 0
NECK PAIN: 0
PALPITATIONS: 0
SORE THROAT: 0
HEADACHES: 0
NAUSEA: 0
BACK PAIN: 0
TINGLING: 0
EYE PAIN: 0
ABDOMINAL PAIN: 0
BLURRED VISION: 0

## 2018-04-23 ASSESSMENT — PAIN SCALES - GENERAL
PAINLEVEL_OUTOF10: 6
PAINLEVEL_OUTOF10: 4
PAINLEVEL_OUTOF10: 0

## 2018-04-23 ASSESSMENT — ACTIVITIES OF DAILY LIVING (ADL): TOILETING: INDEPENDENT

## 2018-04-23 ASSESSMENT — GAIT ASSESSMENTS
ASSISTIVE DEVICE: FRONT WHEEL WALKER
DEVIATION: DECREASED HEEL STRIKE;DECREASED TOE OFF
GAIT LEVEL OF ASSIST: CONTACT GUARD ASSIST
DISTANCE (FEET): 125

## 2018-04-23 NOTE — DISCHARGE PLANNING
Care Transition Team Assessment    Patient lives at home alone with family support.  Patient is pending PT/OT evals for discharge recommendations. SS available to assist as needed.     Information Source  Orientation : Oriented x 4  Information Given By: Patient  Informant's Name: Ang  Who is responsible for making decisions for patient? : Patient    Readmission Evaluation  Is this a readmission?: No    Elopement Risk  Legal Hold: No  Ambulatory or Self Mobile in Wheelchair: Yes  Disoriented: No  Psychiatric Symptoms: None  History of Wandering: No  Elopement this Admit: No  Vocalizing Wanting to Leave: No  Displays Behaviors, Body Language Wanting to Leave: No-Not at Risk for Elopement  Elopement Risk: Not at Risk for Elopement    Interdisciplinary Discharge Planning  Does Admitting Nurse Feel This Could be a Complex Discharge?: No  Primary Care Physician: Sandy Trent  Lives with - Patient's Self Care Capacity: Alone and Able to Care For Self  Patient or legal guardian wants to designate a caregiver (see row info): No  Support Systems: Family Member(s)  Housing / Facility: [address]  Do You Take your Prescribed Medications Regularly: Yes  Able to Return to Previous ADL's: Yes  Mobility Issues: Yes  Prior Services: None  Patient Expects to be Discharged to:: home  Assistance Needed: Unknown at this Time  Durable Medical Equipment: Walker    Discharge Preparedness  What is your plan after discharge?: Home with help  What are your discharge supports?: Child  Prior Functional Level: Independent with Activities of Daily Living    Functional Assesment  Prior Functional Level: Independent with Activities of Daily Living    Finances  Financial Barriers to Discharge: No  Prescription Coverage: Yes    Vision / Hearing Impairment  Vision Impairment : Yes  Right Eye Vision: Impaired, Wears Glasses  Left Eye Vision: Impaired, Wears Glasses  Hearing Impairment : Yes  Hearing Impairment: Both Ears, Patient Declines to Wear  Hearing Device(s)    Values / Beliefs / Concerns  Values / Beliefs Concerns : No  Special Hospitalization Concerns: none    Advance Directive  Advance Directive?: None    Domestic Abuse  Have you ever been the victim of abuse or violence?: No  Physical Abuse or Sexual Abuse: No  Verbal Abuse or Emotional Abuse: No  Possible Abuse Reported to:: Not Applicable    Psychological Assessment  History of Substance Abuse: None  History of Psychiatric Problems: No    Discharge Risks or Barriers  Discharge risks or barriers?: No    Anticipated Discharge Information  Anticipated discharge disposition: HHC, Home, SNF

## 2018-04-23 NOTE — PROGRESS NOTES
Received report from Smita PALUMBO. Assumed care. This pt is AOx4,   denies pain, Patient and RN discussed plan of care including PT/OT, pain management : questions answered. Chart reviewed. Call light in place, fall precautions in place, patient educated on importance of calling for assistance. No additional needs at this time.

## 2018-04-23 NOTE — THERAPY
"Occupational Therapy Evaluation completed.   Functional Status:  Pt is an 84 y/o female, admit after a GLF with resultant L femoral neck fx. Pt lives alone, has family in the area. Pt presents with poor safety awareness, poor judgement, pain with ADLS and functional mobility, decreased I with functional transfers and ADLS. Pt requires Supervision for UB, Min A for LB self care , SBA to CGA for functional transfers. Pt will benefit from continued OT services to increase functional I and safety prior to d/c.  Plan of Care: Will benefit from Occupational Therapy 3 times per week  Discharge Recommendations:  Equipment: Will Continue to Assess for Equipment Needs. Post-acute therapy Discharge to a transitional care facility for continued skilled therapy services.    See \"Rehab Therapy-Acute\" Patient Summary Report for complete documentation.    "

## 2018-04-23 NOTE — PROGRESS NOTES
2 RN skin check:     Pt has a surgical gauze dressing to L hip, CDI and generalized bruising on BUE.  L hip has generalized edema.  No other evidence of skin breakdown or lesions.

## 2018-04-23 NOTE — PROGRESS NOTES
Seen & examined  Slightly confused  Comfortable  Worked with PT today    Vitals:    04/23/18 0400 04/23/18 0746 04/23/18 1200 04/23/18 1600   BP: 125/72 103/48 109/66 130/80   Pulse: 81 76 81 89   Resp: 16 18 18   Temp: 37.1 °C (98.7 °F) 37 °C (98.6 °F) 36.4 °C (97.5 °F) 36.7 °C (98 °F)   SpO2: 97% 96% 99% 92%   Weight:       Height:         LLE:  Dressing with serosanginous moderate drainage  F/e ankle, toes  Foot w/w/p    POD#1 s/p L hip CRPP    - WBAT  - Lovenox  - PT/OT  - Dispo planning

## 2018-04-23 NOTE — CARE PLAN
Problem: Safety  Goal: Will remain free from injury    Intervention: Provide assistance with mobility  Bed in low position, traded socks on, call light within reach. Pt instructed to call nurse for any further needs. Bed alarm in place.        Problem: Knowledge Deficit  Goal: Knowledge of disease process/condition, treatment plan, diagnostic tests, and medications will improve    Intervention: Assess knowledge level of disease process/condition, treatment plan, diagnostic tests, and medications  POC discussed with pt, pt verbalized understanding.

## 2018-04-23 NOTE — PROGRESS NOTES
Pt arrived to the unit via gurney escorted by transport:  VSS, patient is medical. Assessment complete. A&O x 4. No signs of distress noted at this time. Pt complaining of 3/10 pain to R hip. Pt is oriented to the unit, call light, phone system. Fall precautions in place and appropriate signs in place. Call light within reach. Bed is locked and in the lowest position. Pt is educated regarding fall precautions and importance of calling the staff for assistance. Pt denies any additional needs at this time. Will continue to monitor.

## 2018-04-23 NOTE — PROGRESS NOTES
Assumed care of patient, A&Ox4,declines need for pain meds at present,VSS, Reviewed plan of care with pt and family. Shola OSBORNE to L Hip

## 2018-04-23 NOTE — PROGRESS NOTES
[facility] Hospitalist Progress Note    Date of Service: 2018    Chief Complaint  83 y.o. female admitted 2018 with a mechanical ground level fall and a hip fracture s/p repair.     Interval Problem Update  Confused and impulsive  Asking about how her surgery went repeatedly    Consultants/Specialty  ortho    Disposition  Needs snf  Called and discussed with daughter today and she is aware patient is refusing to go but that she is delirious          Review of Systems   Constitutional: Negative for chills and fever.   HENT: Negative for sore throat.    Eyes: Negative for blurred vision and pain.   Respiratory: Negative for cough and shortness of breath.    Cardiovascular: Negative for chest pain and palpitations.   Gastrointestinal: Negative for abdominal pain, nausea and vomiting.   Genitourinary: Negative for dysuria and urgency.   Musculoskeletal: Positive for joint pain. Negative for back pain and neck pain.   Skin: Negative for itching and rash.   Neurological: Negative for dizziness, tingling and headaches.   Psychiatric/Behavioral: Negative for depression. The patient does not have insomnia.    All other systems reviewed and are negative.     Physical Exam  Laboratory/Imaging   Hemodynamics  Temp (24hrs), Av.7 °C (98.1 °F), Min:36.4 °C (97.5 °F), Max:37.1 °C (98.7 °F)   Temperature: 37 °C (98.6 °F)  Pulse  Av  Min: 67  Max: 86 Heart Rate (Monitored): 85  Blood Pressure : 103/48, NIBP: 126/53      Respiratory      Respiration: 18, Pulse Oximetry: 96 %        RUL Breath Sounds: Clear, RML Breath Sounds: Clear, RLL Breath Sounds: Diminished, LOLITA Breath Sounds: Clear, LLL Breath Sounds: Diminished    Fluids    Intake/Output Summary (Last 24 hours) at 18 1101  Last data filed at 18 0900   Gross per 24 hour   Intake             3440 ml   Output             1400 ml   Net             2040 ml       Nutrition  Orders Placed This Encounter   Procedures   • DIET ORDER     Standing Status:    Standing     Number of Occurrences:   1     Order Specific Question:   Diet:     Answer:   Regular [1]     Physical Exam   Constitutional: She is oriented to person, place, and time. She appears well-developed and well-nourished. No distress.   HENT:   Right Ear: External ear normal.   Left Ear: External ear normal.   Nose: Nose normal.   Twitching of face.    Eyes: Conjunctivae are normal. Right eye exhibits no discharge. Left eye exhibits no discharge.   Neck: No JVD present.   Cardiovascular: Regular rhythm and normal heart sounds.    No murmur heard.  Cap refill 2sec  Pulses 2+ throughout  Normal skin  Color.    Pulmonary/Chest: Effort normal and breath sounds normal. No stridor. No respiratory distress. She has no wheezes. She has no rales.   Abdominal: Soft. Bowel sounds are normal. She exhibits no distension. There is no tenderness.   Musculoskeletal: She exhibits no edema or tenderness.   Neurological: She is alert and oriented to person, place, and time.   Skin: Skin is warm and dry. She is not diaphoretic. No erythema.   Psychiatric: She has a normal mood and affect. Her behavior is normal.   Nursing note and vitals reviewed.      Recent Labs      04/22/18   1315  04/23/18   0520   WBC  8.6  8.9   RBC  4.27  3.86*   HEMOGLOBIN  13.8  12.4   HEMATOCRIT  39.5  35.6*   MCV  92.5  92.2   MCH  32.3  32.1   MCHC  34.9  34.8   RDW  43.2  41.9   PLATELETCT  224  208   MPV  10.5  10.8     Recent Labs      04/22/18   1315  04/23/18   0520   SODIUM  136  133*   POTASSIUM  3.8  3.8   CHLORIDE  105  104   CO2  24  25   GLUCOSE  118*  165*   BUN  18  16   CREATININE  0.93  0.81   CALCIUM  9.1  8.2*                      Assessment/Plan     Metabolic encephalopathy   Assessment & Plan    New  Add prn risperdal  Add back home meds mauro valium          Hyponatremia   Assessment & Plan    New   Iv fluids ongoing          Closed left hip fracture (CMS-HCC)- (present on admission)   Assessment & Plan    POD1  Continue pain  control  Lovenox  Pt/ot ongoing  Will need rehab vs snf- consults placed          IFG (impaired fasting glucose)- (present on admission)   Assessment & Plan    No history of DM  Add ssi  Check a1c        Meige syndrome (blepharospasm with oromandibular dystonia)- (present on admission)   Assessment & Plan    Add back home valium          Quality-Core Measures

## 2018-04-23 NOTE — PROGRESS NOTES
Pt becoming restless, trying to get out of bed without calling for assistance, pt is unsteady on feet, RN educated pt on importance of staying in bed, pt continues to get out of bed. Hospitalist aware, new orders placed.

## 2018-04-23 NOTE — THERAPY
"Physical Therapy Evaluation completed.   Bed Mobility:  Supine to Sit: Stand by Assist  Transfers: Sit to Stand: Contact Guard Assist  Gait: Level Of Assist: Contact Guard Assist with Front-Wheel Walker       Plan of Care: Will benefit from Physical Therapy 5 times per week  Discharge Recommendations: Equipment: Will Continue to Assess for Equipment Needs. Post-acute therapy Discharge to a transitional care facility for continued skilled therapy services.    Pt is presenting with weakness and pain L LE as well as impaired gait and balance. Pt is also limited by impaired cognition, poor safety awareness and judgement. Therefore PT is recommending DC to SNF for further therapy to increase independence. If pt refuses SNF then recommend 24/7 assist at home, given physical and cognitive deficits pt is a very high fall risk and do not feel she can care for herself at this time.    See \"Rehab Therapy-Acute\" Patient Summary Report for complete documentation.     "

## 2018-04-24 PROBLEM — E87.6 HYPOKALEMIA: Status: ACTIVE | Noted: 2018-04-24

## 2018-04-24 LAB
ALBUMIN SERPL BCP-MCNC: 3.1 G/DL (ref 3.2–4.9)
ALBUMIN/GLOB SERPL: 1.3 G/DL
ALP SERPL-CCNC: 32 U/L (ref 30–99)
ALT SERPL-CCNC: 12 U/L (ref 2–50)
ANION GAP SERPL CALC-SCNC: 2 MMOL/L (ref 0–11.9)
AST SERPL-CCNC: 19 U/L (ref 12–45)
BASOPHILS # BLD AUTO: 0.5 % (ref 0–1.8)
BASOPHILS # BLD: 0.04 K/UL (ref 0–0.12)
BILIRUB SERPL-MCNC: 0.7 MG/DL (ref 0.1–1.5)
BUN SERPL-MCNC: 18 MG/DL (ref 8–22)
CALCIUM SERPL-MCNC: 8.2 MG/DL (ref 8.4–10.2)
CHLORIDE SERPL-SCNC: 108 MMOL/L (ref 96–112)
CO2 SERPL-SCNC: 27 MMOL/L (ref 20–33)
CREAT SERPL-MCNC: 0.9 MG/DL (ref 0.5–1.4)
EOSINOPHIL # BLD AUTO: 0.08 K/UL (ref 0–0.51)
EOSINOPHIL NFR BLD: 1 % (ref 0–6.9)
ERYTHROCYTE [DISTWIDTH] IN BLOOD BY AUTOMATED COUNT: 42.8 FL (ref 35.9–50)
EST. AVERAGE GLUCOSE BLD GHB EST-MCNC: 126 MG/DL
GLOBULIN SER CALC-MCNC: 2.3 G/DL (ref 1.9–3.5)
GLUCOSE BLD-MCNC: 91 MG/DL (ref 65–99)
GLUCOSE SERPL-MCNC: 111 MG/DL (ref 65–99)
HBA1C MFR BLD: 6 % (ref 0–5.6)
HCT VFR BLD AUTO: 31.4 % (ref 37–47)
HGB BLD-MCNC: 11 G/DL (ref 12–16)
IMM GRANULOCYTES # BLD AUTO: 0.03 K/UL (ref 0–0.11)
IMM GRANULOCYTES NFR BLD AUTO: 0.4 % (ref 0–0.9)
LYMPHOCYTES # BLD AUTO: 1.17 K/UL (ref 1–4.8)
LYMPHOCYTES NFR BLD: 15.1 % (ref 22–41)
MCH RBC QN AUTO: 32.6 PG (ref 27–33)
MCHC RBC AUTO-ENTMCNC: 35 G/DL (ref 33.6–35)
MCV RBC AUTO: 93.2 FL (ref 81.4–97.8)
MONOCYTES # BLD AUTO: 0.66 K/UL (ref 0–0.85)
MONOCYTES NFR BLD AUTO: 8.5 % (ref 0–13.4)
NEUTROPHILS # BLD AUTO: 5.77 K/UL (ref 2–7.15)
NEUTROPHILS NFR BLD: 74.5 % (ref 44–72)
NRBC # BLD AUTO: 0 K/UL
NRBC BLD-RTO: 0 /100 WBC
PLATELET # BLD AUTO: 173 K/UL (ref 164–446)
PMV BLD AUTO: 10.8 FL (ref 9–12.9)
POTASSIUM SERPL-SCNC: 3.5 MMOL/L (ref 3.6–5.5)
PROT SERPL-MCNC: 5.4 G/DL (ref 6–8.2)
RBC # BLD AUTO: 3.37 M/UL (ref 4.2–5.4)
SODIUM SERPL-SCNC: 137 MMOL/L (ref 135–145)
WBC # BLD AUTO: 7.8 K/UL (ref 4.8–10.8)

## 2018-04-24 PROCEDURE — 99233 SBSQ HOSP IP/OBS HIGH 50: CPT | Performed by: INTERNAL MEDICINE

## 2018-04-24 PROCEDURE — 700105 HCHG RX REV CODE 258: Performed by: INTERNAL MEDICINE

## 2018-04-24 PROCEDURE — A9270 NON-COVERED ITEM OR SERVICE: HCPCS | Performed by: INTERNAL MEDICINE

## 2018-04-24 PROCEDURE — 36415 COLL VENOUS BLD VENIPUNCTURE: CPT

## 2018-04-24 PROCEDURE — 700111 HCHG RX REV CODE 636 W/ 250 OVERRIDE (IP): Performed by: ORTHOPAEDIC SURGERY

## 2018-04-24 PROCEDURE — 770001 HCHG ROOM/CARE - MED/SURG/GYN PRIV*

## 2018-04-24 PROCEDURE — 700102 HCHG RX REV CODE 250 W/ 637 OVERRIDE(OP): Performed by: INTERNAL MEDICINE

## 2018-04-24 PROCEDURE — 85025 COMPLETE CBC W/AUTO DIFF WBC: CPT

## 2018-04-24 PROCEDURE — A9270 NON-COVERED ITEM OR SERVICE: HCPCS | Performed by: HOSPITALIST

## 2018-04-24 PROCEDURE — 700102 HCHG RX REV CODE 250 W/ 637 OVERRIDE(OP): Performed by: HOSPITALIST

## 2018-04-24 PROCEDURE — 80053 COMPREHEN METABOLIC PANEL: CPT

## 2018-04-24 PROCEDURE — 82962 GLUCOSE BLOOD TEST: CPT

## 2018-04-24 PROCEDURE — 700111 HCHG RX REV CODE 636 W/ 250 OVERRIDE (IP): Performed by: INTERNAL MEDICINE

## 2018-04-24 PROCEDURE — 83036 HEMOGLOBIN GLYCOSYLATED A1C: CPT

## 2018-04-24 RX ADMIN — ACETAMINOPHEN 325 MG: 325 TABLET, FILM COATED ORAL at 08:55

## 2018-04-24 RX ADMIN — THERA TABS 1 TABLET: TAB at 08:55

## 2018-04-24 RX ADMIN — POTASSIUM CHLORIDE 10 MEQ: 2 INJECTION, SOLUTION, CONCENTRATE INTRAVENOUS at 08:56

## 2018-04-24 RX ADMIN — CALCIUM CARBONATE-VITAMIN D TAB 500 MG-200 UNIT 1 TABLET: 500-200 TAB at 08:55

## 2018-04-24 RX ADMIN — FAMOTIDINE 20 MG: 20 TABLET, FILM COATED ORAL at 08:55

## 2018-04-24 RX ADMIN — DOCUSATE SODIUM AND SENNOSIDES 2 TABLET: 8.6; 5 TABLET, FILM COATED ORAL at 08:55

## 2018-04-24 RX ADMIN — DIAZEPAM 2.5 MG: 5 TABLET ORAL at 08:56

## 2018-04-24 ASSESSMENT — ENCOUNTER SYMPTOMS
EYE PAIN: 0
ABDOMINAL PAIN: 0
DEPRESSION: 0
NAUSEA: 0
FEVER: 0
PALPITATIONS: 0
TINGLING: 0
HEADACHES: 0
VOMITING: 0
SHORTNESS OF BREATH: 0
CONSTIPATION: 0
NECK PAIN: 0
COUGH: 0
CHILLS: 0

## 2018-04-24 ASSESSMENT — PATIENT HEALTH QUESTIONNAIRE - PHQ9
2. FEELING DOWN, DEPRESSED, IRRITABLE, OR HOPELESS: NOT AT ALL
1. LITTLE INTEREST OR PLEASURE IN DOING THINGS: NOT AT ALL
SUM OF ALL RESPONSES TO PHQ9 QUESTIONS 1 AND 2: 0

## 2018-04-24 ASSESSMENT — PAIN SCALES - GENERAL
PAINLEVEL_OUTOF10: 0
PAINLEVEL_OUTOF10: 0

## 2018-04-24 NOTE — PROGRESS NOTES
Seen & examined  Agitated and restless overnight requiring 1:1 sitter  Now at nurses station  Comfortable  Ambulating hallways    Vitals:    04/23/18 1600 04/23/18 2000 04/24/18 0445 04/24/18 0800   BP: 130/80  126/74 110/68   Pulse: 89 93 90 88   Resp: 18 18 16 16   Temp: 36.7 °C (98 °F) 36.6 °C (97.8 °F) 36.6 °C (97.9 °F) 36.8 °C (98.2 °F)   SpO2: 92% 94% 98% 96%   Weight:       Height:         LLE:  Dressing with serosanginous moderate drainage  F/e ankle, toes  Foot w/w/p    POD#2 s/p L hip CRPP    - WBAT  - Lovenox  - PT/OT  - Dressing change today   - Dispo planning

## 2018-04-24 NOTE — DISCHARGE PLANNING
Patient discussed in afternoon rounds.  SW informed that patient's daughter would like to be called about SNF choice.  SW called patient's daughter and she requested that referrals be sent to [facility] and [facility].  SW faxed completed choice form to [facility] for referral assistance.

## 2018-04-24 NOTE — DISCHARGE PLANNING
Medical Social Work     SW intern gave patient IMM letter. Pt given choice form for SNF. Pt chose [facility] and [facility].     This note has been reviewed by Malissa MONTANEZ

## 2018-04-24 NOTE — PROGRESS NOTES
Received report from day shift nurse. Assumed pt care at 1915. Pt is A&Ox4, resting comfortably in bed. Pt on r.a.. No signs of SOB/respiratory distress noted. Labs noted, VSS. Pt denied pain. Pt attempted to get out of bed without calling nursing staff, became agitated when this RN and CNA requested pt to use FWW. Helped to calm pt down by ambulating with pt in the hallway;pt returned in bed. Fall precautions in place. Bed locked & at lowest position. Call light and personal belongings within reach. Continue to monitor

## 2018-04-24 NOTE — OP REPORT
DATE OF SERVICE:  4/22/2018     PREOPERATIVE DIAGNOSIS:  L non-displaced femoral neck fracture      POSTOPERATIVE DIAGNOSIS:  same      PROCEDURE PERFORMED:  1.  L hip closed reduction and percutaneous pinning     SURGEON:  MD Guillaume     FIRST ASSISTANT:  MD Wally     ANESTHESIA:  General endotracheal with popliteal block catheter per my request   for pain management.     ESTIMATED BLOOD LOSS:  minimal.     COMPLICATIONS:  None.     DRAINS:  none     IMPLANTS:  7.3 partially threaded cannulated screws from [facility]     POSTOPERATIVE PLAN:  1.  Perioperative antibiotics.  2.  Follow up in 2 weeks.    Indications for Procedure:  Patient sustained a ground level fall on 4/22/18.  She had immediate pain to the left hip and was really unable to ambulate on the affected extremity.  She was taken to [facility] where she was diagnosed with a left femoral neck fracture non-displaced.  She was set up to undergo surgery that evening pending clearance.      Description of Procedure:    Patient was identified in the preoperative area and the left lower extremity was identified as the operative extremity.  She was taken back to the OR for closed reduction and percutaneous pinning of the left hip.  Anesthesia brought the patient back to the operative theater and she was put to sleep on the preoperative bed.  She was then transferred over to the operative [place] table.  Prior to prepping and draping the leg was reduced under flouro.  After adequate reduction was obtained, the left leg was prepped and draped.  Three guide wires were then placed in a triangle configuration to obtain fixation.  The guidewires were measured and the subsequent lengths were placed.  The screws were partially threaded.  After orthogonal views were obtained and satisfactory reduction was maintained, the guidewires were removed and final images taken.  The poke holes were closed with staples and a clean dressing.  She was then transferred over to the post  operative bed and taken out to recovery. The patient will be weight bearing as tolerated to the left leg.  F/u 2 weeks with orthopedics.        Hu Galaviz  Ortho F&A fellow  Cell: [phone]  04/24/18

## 2018-04-24 NOTE — PROGRESS NOTES
Pt became non-compliant, agitated, attempted to get out of bed and walked on her own multiple times. Helped to redirected pt, security called and currently present with pt. Paged Dr. Jenkins. Awaiting a return call.       2042: Second paged Dr. Jenkins. Dr. Jenkins present on the unit and received order from Dr. Jenkins: x1 Haldol 2mg.

## 2018-04-24 NOTE — DISCHARGE PLANNING
Received call from Thais at [facility], they have accepted patient on service.

## 2018-04-24 NOTE — DISCHARGE PLANNING
Received choice form from [facility] Malissa,  Referral sent to [facility] and [facility],  Referral has been faxed to [facility] at [phone].

## 2018-04-24 NOTE — PROGRESS NOTES
[facility] Hospitalist Progress Note    Date of Service: 2018    Chief Complaint  83 y.o. female admitted 2018 with a mechanical ground level fall and a hip fracture s/p repair.     Interval Problem Update  Patient is confused this morning asking if her daughter has visited her. CNA explained that her daughter has not been by yet. She keeps asking the same question again. Patient is up to chair with assistance of CNA this morning and ambulating with assistance from CNA and front wheel walker to bathroom.     Consultants/Specialty  ortho    Disposition  SNF referral placed, MITALI assisting with dc. Possibly tomm based on bed availability at SNF.           Review of Systems   Constitutional: Negative for chills and fever.   HENT: Positive for hearing loss.    Eyes: Negative for pain.   Respiratory: Negative for cough and shortness of breath.    Cardiovascular: Negative for chest pain and palpitations.   Gastrointestinal: Negative for abdominal pain, constipation, nausea and vomiting.   Genitourinary: Negative for dysuria and urgency.   Musculoskeletal: Positive for joint pain (left hip). Negative for neck pain.   Neurological: Negative for tingling and headaches.   Psychiatric/Behavioral: Negative for depression.   All other systems reviewed and are negative.     Physical Exam  Laboratory/Imaging   Hemodynamics  Temp (24hrs), Av.7 °C (98 °F), Min:36.6 °C (97.8 °F), Max:36.8 °C (98.2 °F)   Temperature: 36.8 °C (98.2 °F)  Pulse  Av.7  Min: 67  Max: 93    Blood Pressure : 110/68      Respiratory      Respiration: 16, Pulse Oximetry: 96 %        RUL Breath Sounds: Clear, RML Breath Sounds: Clear;Diminished, RLL Breath Sounds: Diminished, LOLITA Breath Sounds: Clear, LLL Breath Sounds: Diminished    Fluids    Intake/Output Summary (Last 24 hours) at 18 1343  Last data filed at 18 0446   Gross per 24 hour   Intake              375 ml   Output              600 ml   Net             -225 ml        Nutrition  Orders Placed This Encounter   Procedures   • DIET ORDER     Standing Status:   Standing     Number of Occurrences:   1     Order Specific Question:   Diet:     Answer:   Regular [1]     Physical Exam   Constitutional: She appears well-developed and well-nourished. No distress.   HENT:   Right Ear: External ear normal.   Left Ear: External ear normal.   Nose: Nose normal.   Eyes: Conjunctivae are normal. Right eye exhibits no discharge. Left eye exhibits no discharge.   Neck: No JVD present.   Cardiovascular: Regular rhythm and normal heart sounds.    No murmur heard.  Pulmonary/Chest: Effort normal and breath sounds normal. No stridor. No respiratory distress. She has no wheezes. She has no rales.   Abdominal: Soft. Bowel sounds are normal. She exhibits no distension. There is no tenderness.   Musculoskeletal: She exhibits no edema or tenderness.   Neurological: She is alert.   Skin: Skin is warm and dry. She is not diaphoretic. No erythema.   Psychiatric: She has a normal mood and affect.   Nursing note and vitals reviewed.      Recent Labs      04/22/18   1315  04/23/18   0520  04/24/18   0533   WBC  8.6  8.9  7.8   RBC  4.27  3.86*  3.37*   HEMOGLOBIN  13.8  12.4  11.0*   HEMATOCRIT  39.5  35.6*  31.4*   MCV  92.5  92.2  93.2   MCH  32.3  32.1  32.6   MCHC  34.9  34.8  35.0   RDW  43.2  41.9  42.8   PLATELETCT  224  208  173   MPV  10.5  10.8  10.8     Recent Labs      04/22/18   1315  04/23/18   0520  04/24/18   0533   SODIUM  136  133*  137   POTASSIUM  3.8  3.8  3.5*   CHLORIDE  105  104  108   CO2  24  25  27   GLUCOSE  118*  165*  111*   BUN  18  16  18   CREATININE  0.93  0.81  0.90   CALCIUM  9.1  8.2*  8.2*                      Assessment/Plan     Hypokalemia   Assessment & Plan    Repleting, monitor electrolytes.        Metabolic encephalopathy   Assessment & Plan    New  On prn risperdal  Continued on home regimen        Hyponatremia   Assessment & Plan    Resolved, likely secondary to  decreased PO intake           Closed left hip fracture (CMS-HCC)- (present on admission)   Assessment & Plan    Continue pain control  Lovenox  Pt/ot ongoing  Pending dc to SNF, SW assisting          IFG (impaired fasting glucose)- (present on admission)   Assessment & Plan    No history of DM, BG improving.   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

## 2018-04-24 NOTE — DISCHARGE PLANNING
Received choice form from [facility] Malissa. Referral sent to [facility] at 1028 on 04-25-18.

## 2018-04-25 ENCOUNTER — HOSPITAL ENCOUNTER (OUTPATIENT)
Facility: MEDICAL CENTER | Age: 83
End: 2018-04-27
Attending: EMERGENCY MEDICINE | Admitting: INTERNAL MEDICINE
Payer: MEDICARE

## 2018-04-25 ENCOUNTER — PATIENT OUTREACH (OUTPATIENT)
Dept: HEALTH INFORMATION MANAGEMENT | Facility: OTHER | Age: 83
End: 2018-04-25

## 2018-04-25 ENCOUNTER — APPOINTMENT (OUTPATIENT)
Dept: RADIOLOGY | Facility: MEDICAL CENTER | Age: 83
End: 2018-04-25
Attending: EMERGENCY MEDICINE
Payer: MEDICARE

## 2018-04-25 VITALS
HEART RATE: 85 BPM | OXYGEN SATURATION: 96 % | RESPIRATION RATE: 16 BRPM | DIASTOLIC BLOOD PRESSURE: 53 MMHG | SYSTOLIC BLOOD PRESSURE: 114 MMHG | HEIGHT: 64 IN | TEMPERATURE: 97.9 F | BODY MASS INDEX: 25.69 KG/M2 | WEIGHT: 150.46 LBS

## 2018-04-25 DIAGNOSIS — S72.002A CLOSED FRACTURE OF NECK OF LEFT FEMUR, INITIAL ENCOUNTER (HCC): ICD-10-CM

## 2018-04-25 DIAGNOSIS — S72.002S CLOSED FRACTURE OF LEFT HIP, SEQUELA: ICD-10-CM

## 2018-04-25 DIAGNOSIS — R41.0 DELIRIUM: ICD-10-CM

## 2018-04-25 PROBLEM — E87.6 HYPOKALEMIA: Status: RESOLVED | Noted: 2018-04-24 | Resolved: 2018-04-25

## 2018-04-25 PROBLEM — G93.41 METABOLIC ENCEPHALOPATHY: Status: RESOLVED | Noted: 2018-04-23 | Resolved: 2018-04-25

## 2018-04-25 LAB
ANION GAP SERPL CALC-SCNC: 6 MMOL/L (ref 0–11.9)
BUN SERPL-MCNC: 18 MG/DL (ref 8–22)
CALCIUM SERPL-MCNC: 8.3 MG/DL (ref 8.4–10.2)
CHLORIDE SERPL-SCNC: 107 MMOL/L (ref 96–112)
CO2 SERPL-SCNC: 25 MMOL/L (ref 20–33)
CREAT SERPL-MCNC: 0.83 MG/DL (ref 0.5–1.4)
GLUCOSE SERPL-MCNC: 119 MG/DL (ref 65–99)
POTASSIUM SERPL-SCNC: 3.6 MMOL/L (ref 3.6–5.5)
SODIUM SERPL-SCNC: 138 MMOL/L (ref 135–145)

## 2018-04-25 PROCEDURE — 99239 HOSP IP/OBS DSCHRG MGMT >30: CPT | Performed by: INTERNAL MEDICINE

## 2018-04-25 PROCEDURE — 700111 HCHG RX REV CODE 636 W/ 250 OVERRIDE (IP): Performed by: ORTHOPAEDIC SURGERY

## 2018-04-25 PROCEDURE — 99285 EMERGENCY DEPT VISIT HI MDM: CPT

## 2018-04-25 PROCEDURE — 80053 COMPREHEN METABOLIC PANEL: CPT

## 2018-04-25 PROCEDURE — 36415 COLL VENOUS BLD VENIPUNCTURE: CPT

## 2018-04-25 PROCEDURE — 80048 BASIC METABOLIC PNL TOTAL CA: CPT

## 2018-04-25 PROCEDURE — A9270 NON-COVERED ITEM OR SERVICE: HCPCS | Performed by: HOSPITALIST

## 2018-04-25 PROCEDURE — 700102 HCHG RX REV CODE 250 W/ 637 OVERRIDE(OP): Performed by: HOSPITALIST

## 2018-04-25 RX ORDER — OXYCODONE HYDROCHLORIDE 5 MG/1
5 TABLET ORAL EVERY 6 HOURS PRN
Qty: 15 TAB | Refills: 0 | Status: ON HOLD | OUTPATIENT
Start: 2018-04-25 | End: 2018-04-27

## 2018-04-25 RX ADMIN — CALCIUM CARBONATE-VITAMIN D TAB 500 MG-200 UNIT 1 TABLET: 500-200 TAB at 10:21

## 2018-04-25 RX ADMIN — FAMOTIDINE 20 MG: 20 TABLET, FILM COATED ORAL at 10:20

## 2018-04-25 ASSESSMENT — PATIENT HEALTH QUESTIONNAIRE - PHQ9
1. LITTLE INTEREST OR PLEASURE IN DOING THINGS: NOT AT ALL
SUM OF ALL RESPONSES TO PHQ9 QUESTIONS 1 AND 2: 0
2. FEELING DOWN, DEPRESSED, IRRITABLE, OR HOPELESS: NOT AT ALL

## 2018-04-25 ASSESSMENT — PAIN SCALES - GENERAL
PAINLEVEL_OUTOF10: 0
PAINLEVEL_OUTOF10: 0

## 2018-04-25 NOTE — DISCHARGE PLANNING
Received Transportation Communication request,    Transport has been arranged with Yulisa at [facility] for 3:30 pm today. [facility] to provide transport. ULISES Leonardo notified.

## 2018-04-25 NOTE — PROGRESS NOTES
Report received from Day RN, assumed care of pt at this time. POC and medications reviewed with pt. Pt verbalized understanding. Pt pleasantly confused. Reoriented. Pt denies pain, SOB, or dizziness at this time. Safety measures in place. Bed alarm on. Will continue to monitor.

## 2018-04-25 NOTE — DISCHARGE PLANNING
SW informed by Hospitalist that this patient is medically cleared to d/c to SNF today.  SW completed transportation request and faxed to [facility] for assistance.

## 2018-04-25 NOTE — CARE PLAN
Problem: Mobility  Goal: Risk for activity intolerance will decrease  Outcome: PROGRESSING AS EXPECTED  Patient is walking frequently with assistance from staff.

## 2018-04-25 NOTE — CONSULTS
"Orthopedic Physician Assistant Note    Subjective:  Patient resting in chair today.  Doing well.  Awaiting for PT.    Objective:  LLE: incision healing nicely  BCR to all toes  SILT  Able to flex/extend ankle/knee  Blood pressure 119/56, pulse 79, temperature 36.6 °C (97.9 °F), resp. rate 16, height 1.626 m (5' 4\"), weight 68.2 kg (150 lb 7.4 oz), SpO2 96 %, not currently breastfeeding.    Labs:  Recent Labs      04/22/18   1315  04/23/18   0520  04/24/18   0533   WBC  8.6  8.9  7.8   RBC  4.27  3.86*  3.37*   HEMOGLOBIN  13.8  12.4  11.0*   HEMATOCRIT  39.5  35.6*  31.4*   MCV  92.5  92.2  93.2   MCH  32.3  32.1  32.6   RDW  43.2  41.9  42.8   PLATELETCT  224  208  173   MPV  10.5  10.8  10.8   NEUTSPOLYS  78.20*   --   74.50*   LYMPHOCYTES  14.00*   --   15.10*   MONOCYTES  5.80   --   8.50   EOSINOPHILS  0.90   --   1.00   BASOPHILS  0.60   --   0.50         Recent Labs      04/23/18   0520  04/24/18   0533  04/25/18   0335   SODIUM  133*  137  138   POTASSIUM  3.8  3.5*  3.6   CHLORIDE  104  108  107   CO2  25  27  25   GLUCOSE  165*  111*  119*   BUN  16  18  18       Results     ** No results found for the last 168 hours. **          Assessment:  s/p L hip CRPP    Plan:  Continue PT  Discharge planning  f/u Ortho 2 weeks Dr. Wally Ortega F&A fellow  Cell: [phone]  04/25/18            "

## 2018-04-25 NOTE — CARE PLAN
Problem: Safety  Goal: Will remain free from injury  Outcome: PROGRESSING AS EXPECTED      Problem: Knowledge Deficit  Goal: Knowledge of disease process/condition, treatment plan, diagnostic tests, and medications will improve  Outcome: PROGRESSING AS EXPECTED  Pt reoriented. POC reviewed with pt. Pt verbalized understanding.

## 2018-04-25 NOTE — PROGRESS NOTES
Report called to RN at [facility] center. All questions addressed. Patient dressed and IV removed.

## 2018-04-25 NOTE — DISCHARGE SUMMARY
CHIEF COMPLAINT ON ADMISSION  Chief Complaint   Patient presents with   • Hip Injury     Left hip, trip and fell over cat post       CODE STATUS  Full Code    HPI & HOSPITAL COURSE  Please see H&P dictated by Dr. Jenkins. This is a 83 y.o. year old female here with left nondisplaced femoral neck fracture. Patient was admitted and orthopedics was consulted. Patient underwent a left hip closed reduction and percutaneous pinning with Dr. Galaviz. Patient tolerated the procedure well and was evaluated by physical and occupational therapy. She is discharged to skilled nursing facility for ongoing rehabilitation therapy. Patient is weightbearing as tolerated. She is to follow-up with orthopedic surgery as scheduled. Patient is to take Lovenox 40 mg subcutaneous daily for DVT prophylaxis per orthopedic recommendations.    Therefore, she is discharged in good and stable condition for further post-acute management.     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        11/9/11 SHE Is going to stop the eye injections and face injections    Closed left hip fracture (CMS-[facility]) POA: Yes    Hyponatremia POA: Unknown  Resolved Problems:    IFG (impaired fasting glucose) POA: Yes    Metabolic encephalopathy POA: Unknown    Hypokalemia POA: Unknown      FOLLOW UP  Future Appointments  Date Time Provider Department Center   5/8/2018 11:40 AM MARIUS[facility] SHILA Lo M.D.  [address] [facility] [address]  [phone]    On 5/2/2018  PLEASE ARRIVE AT 8:45AM FOR YOUR 9:00AM APPOINTMENT. THANK YOU      MEDICATIONS ON DISCHARGE   Ang Lancaster   Home Medication Instructions OLAMIDE:[id number]    Printed on:04/25/18 1127   Medication Information                      CALTRATE 600+D PO  Take 1 Tab by mouth every day.             diazePAM (VALIUM) 5 MG Tab  Take 2.5 mg by mouth  every day.             enoxaparin (LOVENOX) 40 MG/0.4ML Solution inj  Inject 40 mg as instructed every day.             famotidine (PEPCID) 20 MG TABS  Take 20 mg by mouth PRN.             MULTIVITAL PO  Take 1 Tab by mouth every day.             oxyCODONE immediate-release (ROXICODONE) 5 MG Tab  Take 1 Tab by mouth every 6 hours as needed for Severe Pain for up to 3 days.                 DIET  Orders Placed This Encounter   Procedures   • DIET ORDER     Standing Status:   Standing     Number of Occurrences:   1     Order Specific Question:   Diet:     Answer:   Regular [1]       ACTIVITY  As tolerated and directed by skilled nursing.      LINES, DRAINS, AND WOUNDS  This is an automated list. Peripheral IVs will be removed prior to discharge.  PIV Group Left Wrist 20g Flexible Catheter (Active)   Line Secured Taped;Transparent 4/24/2018  8:00 PM   Site Condition / Description Assessed;Patent;Clean;Dry;Intact 4/24/2018  8:00 PM   Dressing Type / Description Clean;Dry;Intact;Transparent;Occlusive 4/24/2018  8:00 PM   Dressing Status Observed 4/24/2018  8:00 PM   Saline Locked Yes 4/24/2018  8:00 PM   Infiltration Grading Used by [facility] 0 4/24/2018  8:00 PM   Phlebitis Scale (Used by [facility]) 0 4/24/2018  8:00 PM       Surgical Incision  Incision Left Hip (Active)   Wound Bed Other (comment) 4/24/2018  8:00 PM   Drainage  Minimal;Serosanguinous 4/24/2018  8:00 PM   Periwound Skin Normal;Intact 4/24/2018  8:00 PM   Daily - Wound Closure Other (Comments) 4/24/2018  8:00 PM   Dressing Options Dry Gauze;Transparent Film 4/24/2018  8:00 PM   Dressing Status / Change Observed 4/24/2018  8:00 PM   Daily - Dressing Change Observed 4/24/2018  8:00 PM                  MENTAL STATUS ON TRANSFER  Level of Consciousness: Alert  Orientation : Oriented x 4  Speech: Speech Clear    CONSULTATIONS  Dr. Lo - Orthopedics     PROCEDURES  Left hip closed reduction and percutaneous pinning    LABORATORY  Lab Results   Component  Value Date/Time    SODIUM 138 04/25/2018 03:35 AM    POTASSIUM 3.6 04/25/2018 03:35 AM    CHLORIDE 107 04/25/2018 03:35 AM    CO2 25 04/25/2018 03:35 AM    GLUCOSE 119 (H) 04/25/2018 03:35 AM    BUN 18 04/25/2018 03:35 AM    CREATININE 0.83 04/25/2018 03:35 AM    CREATININE 0.88 11/28/2012 10:01 AM    GLOMRATE >59 11/19/2010 11:05 AM        Lab Results   Component Value Date/Time    WBC 7.8 04/24/2018 05:33 AM    WBC 5.5 06/08/2011 09:08 AM    HEMOGLOBIN 11.0 (L) 04/24/2018 05:33 AM    HEMATOCRIT 31.4 (L) 04/24/2018 05:33 AM    PLATELETCT 173 04/24/2018 05:33 AM        This dictation was created using voice recognition software. The accuracy of the dictation is limited to the abilities of the software. I expect there may be some errors of grammar and possibly content.    Total time of the discharge process exceeds 38 minutes.

## 2018-04-25 NOTE — PROGRESS NOTES
Patient has required frequent reorienting today. Taken for several walks and wheelchair rides. Pt has denies pain. Placement for SNF is planned for tomorrow. Pt was seen by ortho and dressing was changed today.

## 2018-04-25 NOTE — DISCHARGE PLANNING
MITALI called patient's daughter, Anahi and informed her of patient discharge plan.  Anahi stated that she is in agreement with this patient discharging to [facility] today at 3:30pm.  MITALI completed PASRR. (#[id number]).  COBRA form completed will be provided to Floor RN.

## 2018-04-25 NOTE — CARE PLAN
Problem: Safety  Goal: Will remain free from falls  Outcome: PROGRESSING AS EXPECTED  Patient has bed alarm in place and walking with a walker

## 2018-04-26 PROBLEM — G93.40 ACUTE ENCEPHALOPATHY: Status: ACTIVE | Noted: 2018-04-26

## 2018-04-26 LAB
ALBUMIN SERPL BCP-MCNC: 3.8 G/DL (ref 3.2–4.9)
ALBUMIN/GLOB SERPL: 1.3 G/DL
ALP SERPL-CCNC: 41 U/L (ref 30–99)
ALT SERPL-CCNC: 22 U/L (ref 2–50)
ANION GAP SERPL CALC-SCNC: 5 MMOL/L (ref 0–11.9)
APPEARANCE UR: CLEAR
AST SERPL-CCNC: 36 U/L (ref 12–45)
BASOPHILS # BLD AUTO: 0.6 % (ref 0–1.8)
BASOPHILS # BLD: 0.06 K/UL (ref 0–0.12)
BILIRUB SERPL-MCNC: 0.7 MG/DL (ref 0.1–1.5)
BILIRUB UR QL STRIP.AUTO: NEGATIVE
BUN SERPL-MCNC: 26 MG/DL (ref 8–22)
CALCIUM SERPL-MCNC: 8.8 MG/DL (ref 8.4–10.2)
CHLORIDE SERPL-SCNC: 106 MMOL/L (ref 96–112)
CO2 SERPL-SCNC: 26 MMOL/L (ref 20–33)
COLOR UR: YELLOW
CREAT SERPL-MCNC: 0.97 MG/DL (ref 0.5–1.4)
EOSINOPHIL # BLD AUTO: 0.17 K/UL (ref 0–0.51)
EOSINOPHIL NFR BLD: 1.7 % (ref 0–6.9)
EPI CELLS #/AREA URNS HPF: NORMAL /HPF
ERYTHROCYTE [DISTWIDTH] IN BLOOD BY AUTOMATED COUNT: 44.1 FL (ref 35.9–50)
FOLATE SERPL-MCNC: 21 NG/ML
FOLATE SERPL-MCNC: >23.5 NG/ML
GLOBULIN SER CALC-MCNC: 2.9 G/DL (ref 1.9–3.5)
GLUCOSE SERPL-MCNC: 124 MG/DL (ref 65–99)
GLUCOSE UR STRIP.AUTO-MCNC: NEGATIVE MG/DL
HCT VFR BLD AUTO: 35 % (ref 37–47)
HGB BLD-MCNC: 12.1 G/DL (ref 12–16)
IMM GRANULOCYTES # BLD AUTO: 0.06 K/UL (ref 0–0.11)
IMM GRANULOCYTES NFR BLD AUTO: 0.6 % (ref 0–0.9)
KETONES UR STRIP.AUTO-MCNC: ABNORMAL MG/DL
LEUKOCYTE ESTERASE UR QL STRIP.AUTO: ABNORMAL
LYMPHOCYTES # BLD AUTO: 2.08 K/UL (ref 1–4.8)
LYMPHOCYTES NFR BLD: 21.1 % (ref 22–41)
MCH RBC QN AUTO: 32.7 PG (ref 27–33)
MCHC RBC AUTO-ENTMCNC: 34.6 G/DL (ref 33.6–35)
MCV RBC AUTO: 94.6 FL (ref 81.4–97.8)
MICRO URNS: ABNORMAL
MONOCYTES # BLD AUTO: 0.67 K/UL (ref 0–0.85)
MONOCYTES NFR BLD AUTO: 6.8 % (ref 0–13.4)
NEUTROPHILS # BLD AUTO: 6.82 K/UL (ref 2–7.15)
NEUTROPHILS NFR BLD: 69.2 % (ref 44–72)
NITRITE UR QL STRIP.AUTO: NEGATIVE
NRBC # BLD AUTO: 0 K/UL
NRBC BLD-RTO: 0 /100 WBC
PH UR STRIP.AUTO: 5 [PH]
PLATELET # BLD AUTO: 209 K/UL (ref 164–446)
PMV BLD AUTO: 10.5 FL (ref 9–12.9)
POTASSIUM SERPL-SCNC: 3.6 MMOL/L (ref 3.6–5.5)
PROT SERPL-MCNC: 6.7 G/DL (ref 6–8.2)
PROT UR QL STRIP: NEGATIVE MG/DL
RBC # BLD AUTO: 3.7 M/UL (ref 4.2–5.4)
RBC UR QL AUTO: ABNORMAL
SODIUM SERPL-SCNC: 137 MMOL/L (ref 135–145)
SP GR UR STRIP.AUTO: 1.02
TREPONEMA PALLIDUM IGG+IGM AB [PRESENCE] IN SERUM OR PLASMA BY IMMUNOASSAY: NON REACTIVE
TSH SERPL DL<=0.005 MIU/L-ACNC: 2.67 UIU/ML (ref 0.38–5.33)
VIT B12 SERPL-MCNC: 339 PG/ML (ref 211–911)
VIT B12 SERPL-MCNC: 350 PG/ML (ref 211–911)
WBC # BLD AUTO: 9.9 K/UL (ref 4.8–10.8)
WBC #/AREA URNS HPF: NORMAL /HPF

## 2018-04-26 PROCEDURE — 70450 CT HEAD/BRAIN W/O DYE: CPT

## 2018-04-26 PROCEDURE — 97165 OT EVAL LOW COMPLEX 30 MIN: CPT

## 2018-04-26 PROCEDURE — 82607 VITAMIN B-12: CPT | Mod: 91

## 2018-04-26 PROCEDURE — 82746 ASSAY OF FOLIC ACID SERUM: CPT | Mod: 91

## 2018-04-26 PROCEDURE — G8987 SELF CARE CURRENT STATUS: HCPCS | Mod: CJ

## 2018-04-26 PROCEDURE — G8978 MOBILITY CURRENT STATUS: HCPCS | Mod: CJ

## 2018-04-26 PROCEDURE — 700102 HCHG RX REV CODE 250 W/ 637 OVERRIDE(OP): Performed by: INTERNAL MEDICINE

## 2018-04-26 PROCEDURE — 81001 URINALYSIS AUTO W/SCOPE: CPT

## 2018-04-26 PROCEDURE — 85025 COMPLETE CBC W/AUTO DIFF WBC: CPT

## 2018-04-26 PROCEDURE — 36415 COLL VENOUS BLD VENIPUNCTURE: CPT

## 2018-04-26 PROCEDURE — A9270 NON-COVERED ITEM OR SERVICE: HCPCS | Performed by: INTERNAL MEDICINE

## 2018-04-26 PROCEDURE — 86780 TREPONEMA PALLIDUM: CPT

## 2018-04-26 PROCEDURE — G8988 SELF CARE GOAL STATUS: HCPCS | Mod: CI

## 2018-04-26 PROCEDURE — 97535 SELF CARE MNGMENT TRAINING: CPT

## 2018-04-26 PROCEDURE — 99219 PR INITIAL OBSERVATION CARE,LEVL II: CPT | Performed by: INTERNAL MEDICINE

## 2018-04-26 PROCEDURE — 87535 HIV-1 PROBE&REVERSE TRNSCRPJ: CPT

## 2018-04-26 PROCEDURE — G0378 HOSPITAL OBSERVATION PER HR: HCPCS

## 2018-04-26 PROCEDURE — 84443 ASSAY THYROID STIM HORMONE: CPT

## 2018-04-26 PROCEDURE — 97161 PT EVAL LOW COMPLEX 20 MIN: CPT

## 2018-04-26 PROCEDURE — G8979 MOBILITY GOAL STATUS: HCPCS | Mod: CI

## 2018-04-26 PROCEDURE — 700105 HCHG RX REV CODE 258: Performed by: INTERNAL MEDICINE

## 2018-04-26 RX ORDER — FAMOTIDINE 20 MG/1
20 TABLET, FILM COATED ORAL DAILY
Status: DISCONTINUED | OUTPATIENT
Start: 2018-04-26 | End: 2018-04-27 | Stop reason: HOSPADM

## 2018-04-26 RX ORDER — HALOPERIDOL 5 MG/ML
1 INJECTION INTRAMUSCULAR EVERY 6 HOURS PRN
Status: DISCONTINUED | OUTPATIENT
Start: 2018-04-26 | End: 2018-04-27 | Stop reason: HOSPADM

## 2018-04-26 RX ORDER — ACETAMINOPHEN 500 MG
500 TABLET ORAL EVERY 6 HOURS PRN
Status: DISCONTINUED | OUTPATIENT
Start: 2018-04-26 | End: 2018-04-27 | Stop reason: HOSPADM

## 2018-04-26 RX ORDER — BISACODYL 10 MG
10 SUPPOSITORY, RECTAL RECTAL
Status: DISCONTINUED | OUTPATIENT
Start: 2018-04-26 | End: 2018-04-27 | Stop reason: HOSPADM

## 2018-04-26 RX ORDER — ONDANSETRON 4 MG/1
4 TABLET, ORALLY DISINTEGRATING ORAL EVERY 4 HOURS PRN
Status: DISCONTINUED | OUTPATIENT
Start: 2018-04-26 | End: 2018-04-27 | Stop reason: HOSPADM

## 2018-04-26 RX ORDER — OXYCODONE HYDROCHLORIDE 5 MG/1
5 TABLET ORAL EVERY 6 HOURS PRN
Status: DISCONTINUED | OUTPATIENT
Start: 2018-04-26 | End: 2018-04-27 | Stop reason: HOSPADM

## 2018-04-26 RX ORDER — QUETIAPINE FUMARATE 25 MG/1
25 TABLET, FILM COATED ORAL EVERY EVENING
Status: DISCONTINUED | OUTPATIENT
Start: 2018-04-26 | End: 2018-04-27 | Stop reason: HOSPADM

## 2018-04-26 RX ORDER — SODIUM CHLORIDE 9 MG/ML
INJECTION, SOLUTION INTRAVENOUS CONTINUOUS
Status: DISCONTINUED | OUTPATIENT
Start: 2018-04-26 | End: 2018-04-27 | Stop reason: HOSPADM

## 2018-04-26 RX ORDER — ONDANSETRON 2 MG/ML
4 INJECTION INTRAMUSCULAR; INTRAVENOUS EVERY 4 HOURS PRN
Status: DISCONTINUED | OUTPATIENT
Start: 2018-04-26 | End: 2018-04-27 | Stop reason: HOSPADM

## 2018-04-26 RX ORDER — POLYETHYLENE GLYCOL 3350 17 G/17G
1 POWDER, FOR SOLUTION ORAL
Status: DISCONTINUED | OUTPATIENT
Start: 2018-04-26 | End: 2018-04-27 | Stop reason: HOSPADM

## 2018-04-26 RX ORDER — AMOXICILLIN 250 MG
2 CAPSULE ORAL 2 TIMES DAILY
Status: DISCONTINUED | OUTPATIENT
Start: 2018-04-26 | End: 2018-04-27 | Stop reason: HOSPADM

## 2018-04-26 RX ADMIN — QUETIAPINE FUMARATE 25 MG: 25 TABLET ORAL at 20:33

## 2018-04-26 RX ADMIN — SODIUM CHLORIDE: 9 INJECTION, SOLUTION INTRAVENOUS at 04:28

## 2018-04-26 RX ADMIN — ACETAMINOPHEN 500 MG: 500 TABLET ORAL at 12:32

## 2018-04-26 ASSESSMENT — PAIN SCALES - GENERAL
PAINLEVEL_OUTOF10: 0
PAINLEVEL_OUTOF10: 2
PAINLEVEL_OUTOF10: 0

## 2018-04-26 ASSESSMENT — GAIT ASSESSMENTS
DEVIATION: ANTALGIC
ASSISTIVE DEVICE: FRONT WHEEL WALKER
DISTANCE (FEET): 300
GAIT LEVEL OF ASSIST: STAND BY ASSIST

## 2018-04-26 ASSESSMENT — LIFESTYLE VARIABLES
HOW MANY TIMES IN THE PAST YEAR HAVE YOU HAD 5 OR MORE DRINKS IN A DAY: 0
HAVE YOU EVER FELT YOU SHOULD CUT DOWN ON YOUR DRINKING: NO
EVER_SMOKED: YES
AVERAGE NUMBER OF DAYS PER WEEK YOU HAVE A DRINK CONTAINING ALCOHOL: 0
TOTAL SCORE: 0
EVER HAD A DRINK FIRST THING IN THE MORNING TO STEADY YOUR NERVES TO GET RID OF A HANGOVER: NO
TOTAL SCORE: 0
ON A TYPICAL DAY WHEN YOU DRINK ALCOHOL HOW MANY DRINKS DO YOU HAVE: 0
TOTAL SCORE: 0
HAVE PEOPLE ANNOYED YOU BY CRITICIZING YOUR DRINKING: NO
CONSUMPTION TOTAL: NEGATIVE
ALCOHOL_USE: NO
EVER FELT BAD OR GUILTY ABOUT YOUR DRINKING: NO

## 2018-04-26 ASSESSMENT — ACTIVITIES OF DAILY LIVING (ADL): TOILETING: INDEPENDENT

## 2018-04-26 ASSESSMENT — PATIENT HEALTH QUESTIONNAIRE - PHQ9
SUM OF ALL RESPONSES TO PHQ9 QUESTIONS 1 AND 2: 0
1. LITTLE INTEREST OR PLEASURE IN DOING THINGS: NOT AT ALL
2. FEELING DOWN, DEPRESSED, IRRITABLE, OR HOPELESS: NOT AT ALL

## 2018-04-26 NOTE — DISCHARGE PLANNING
Care Transition Team Assessment      SW intern met with patient a bedside. Unable to properly assess patient. Pt recently discharge from [facility]. SW will follow up with patient later today.     Information Source  Orientation : Disoriented to Event, Disoriented to Person  Information Given By: Patient  Informant's Name: Ang   Who is responsible for making decisions for patient? : Patient    Readmission Evaluation  Is this a readmission?: No    Elopement Risk  Legal Hold: No  Ambulatory or Self Mobile in Wheelchair: Yes  Disoriented: Situation-At Risk for Elopement  Psychiatric Symptoms: Impulsivity-at Risk for Elopement  History of Wandering: No  Elopement this Admit: No  Vocalizing Wanting to Leave: No  Displays Behaviors, Body Language Wanting to Leave: No-Not at Risk for Elopement  Elopement Risk: Not at Risk for Elopement  Wanderguard On: Unavailable  Personal Belongings: Hospital Clothing Only  Picture of Patient on Inside Chart Front Cover: No (See Comments)  Purple Armband on Patient: No (See Comments)    Interdisciplinary Discharge Planning  Patient or legal guardian wants to designate a caregiver (see row info): No    Discharge Preparedness  What is your plan after discharge?: Skilled nursing facility  What are your discharge supports?: Child  Prior Functional Level: Independent with Activities of Daily Living  Difficulity with ADLs: None  Difficulity with IADLs: None    Functional Assesment  Prior Functional Level: Independent with Activities of Daily Living    Finances  Financial Barriers to Discharge: No    Vision / Hearing Impairment  Vision Impairment : Yes  Right Eye Vision: Impaired, Wears Glasses  Left Eye Vision: Impaired, Wears Glasses  Hearing Impairment : Yes  Hearing Impairment: Both Ears, Patient Declines to Wear Hearing Device(s)  Does Pt Need Special Equipment for the Hearing Impaired?: No    Values / Beliefs / Concerns  Values / Beliefs Concerns : No  Special Hospitalization  Concerns: None    Advance Directive  Advance Directive?: None  Advance Directive offered?: AD Booklet refused    Domestic Abuse  Have you ever been the victim of abuse or violence?: No  Physical Abuse or Sexual Abuse: No  Verbal Abuse or Emotional Abuse: No  Possible Abuse Reported to:: Not Applicable    Psychological Assessment  History of Substance Abuse: None  History of Psychiatric Problems: No  Non-compliant with Treatment: No  Newly Diagnosed Illness: No    Discharge Risks or Barriers  Discharge risks or barriers?: No    Anticipated Discharge Information  Anticipated discharge disposition: SNF

## 2018-04-26 NOTE — PROGRESS NOTES
Patient arrived to unit via gurney.  Transferred to bed using slide board as patient was tired and wanted to stay in bed.  Patient is AOx4 with orientation questions, but becomes easily agitated and tries to reach for objects that are not present.  VSS.  Gauze and tegaderm dressing present on left hip.  Left foot is swollen.  Skin check complete.  Will continue to monitor.

## 2018-04-26 NOTE — ASSESSMENT & PLAN NOTE
- unsure of etiology  - hx of recurrent UTIs but UA unremarkable  - CT head unremarkable  - B12/folate/TSH pending  - cont with gentle IVFs and monitor

## 2018-04-26 NOTE — PROGRESS NOTES
Report received from NOC RN. Pt in cardiac chair with chair alarm in place. Pt calm and cooperative. A&0x4. Educated on call light and need to call before attempting to ambulate. Pt impulsive in past. CNA updated. Will continue to monitor.

## 2018-04-26 NOTE — PROGRESS NOTES
Patient admitted after midnight by Dr. Russell for evaluation of delirium. Patient is alert awake oriented ×3 this morning. Patient is calm and cooperative with nursing staff and myself. She does not demonstrate any combat his behavior. Checking for reversible causes of delirium.

## 2018-04-26 NOTE — ED NOTES
"Albina from lab called  She is cancelling bld due to \"clot's seen\"  New bld draw done and sent to lab    "

## 2018-04-26 NOTE — CARE PLAN
Problem: Communication  Goal: The ability to communicate needs accurately and effectively will improve  Outcome: PROGRESSING SLOWER THAN EXPECTED    Intervention: [place] patient and significant other/support system to call light to alert staff of needs  Patient oriented to surroundings and unit policies/routines.  Educated and states understanding of the use of the call button.  Patient does not call appropriately.      Problem: Safety  Goal: Will remain free from falls  Outcome: PROGRESSING AS EXPECTED

## 2018-04-26 NOTE — ED NOTES
"Pt was evaluated by MD  Pt awake, alert and oriented X 2 w/ noted small episodes of \"absent looks\" of possible confusion   Noted pt reaching out and attempting to grab at what staff see's and nothing  Pt is [place] in L ear   "

## 2018-04-26 NOTE — PROGRESS NOTES
Pt continually getting up without calling. Pt reoriented and educated on need to call before ambulation. Chair and bed alarm remains on. Pt unhooking chair alarm when getting up. Educated that alarm needs to be on for safety.

## 2018-04-26 NOTE — H&P
Hospital Medicine History and Physical    Date of Service  4/26/2018    Chief Complaint  Chief Complaint   Patient presents with   • Combative     Pt MARÍA COLLINS from [facility] after reportedly being combative, hitting and attempting to bite staff when she would not go back to her bed; pt states she did not want to be bossed around   • Altered Mental Status     Per family, pt is not acting normal; pt is A/Ox4 at this time; per [facility] pt had possbile visual hallucination; pt not hallucinating at this time       History of Presenting Illness  83 y.o. female who presented 4/25/2018 with acute delirium.  Most of history obtain from chart as patient is confused.  Per chart, patient was reported as being combative at SNF ([facility]) towards staff.  Family had stated that she had been behaving abnormal prior to arrival.  At this time, patient states she just wants to sleep.      Primary Care Physician  ABIMBOLA Maria.    Consultants  NONE    Code Status  FULL    Review of Systems  Review of Systems   Unable to perform ROS: Mental status change          Past Medical History  Past Medical History:   Diagnosis Date   • Risk for falls 10/11/2017   • Degenerative joint disease     lbp and neck pain   • [place] (hard of hearing)     rt ear with hearing aide   • IFG (impaired fasting glucose)    • Impaired fasting glucose    • Meige syndrome (blepharospasm with oromandibular dystonia)    • Osteoporosis    • Recurrent UTI    • Vertigo        Surgical History  Past Surgical History:   Procedure Laterality Date   • HIP CANNULATED SCREW Left 4/22/2018    Procedure: HIP CANNULATED SCREW;  Surgeon: Hu Galaviz M.D.;  Location: SURGERY [facility];  Service: Orthopedics   • TUBAL COAGULATION LAPAROSCOPIC BILATERAL  1973       Medications  No current facility-administered medications on file prior to encounter.      Current Outpatient Prescriptions on File Prior to Encounter   Medication Sig Dispense Refill   • enoxaparin  (LOVENOX) 40 MG/0.4ML Solution inj Inject 40 mg as instructed every day.     • oxyCODONE immediate-release (ROXICODONE) 5 MG Tab Take 1 Tab by mouth every 6 hours as needed for Severe Pain for up to 3 days. 15 Tab 0   • diazePAM (VALIUM) 5 MG Tab Take 2.5 mg by mouth every day.     • famotidine (PEPCID) 20 MG TABS Take 20 mg by mouth PRN.     • MULTIVITAL PO Take 1 Tab by mouth every day.     • CALTRATE 600+D PO Take 1 Tab by mouth every day.         Family History  Family History   Problem Relation Age of Onset   • Stroke Mother    • Diabetes Father    • Alcohol/Drug Father      alcholism   • Heart Disease Paternal Grandmother      possible MI       Social History  Social History   Substance Use Topics   • Smoking status: Former Smoker     Packs/day: 1.50     Years: 45.00     Types: Cigarettes     Quit date: 2004   • Smokeless tobacco: Never Used   • Alcohol use 3.5 - 17.5 oz/week     7 - 35 Glasses of wine per week       Allergies  Allergies   Allergen Reactions   • Nkda [No Known Drug Allergy]    • Other Environmental Runny Nose and Itching     pollens        Physical Exam  Laboratory   Hemodynamics  Temp (24hrs), Av.2 °C (98.9 °F), Min:36.7 °C (98.1 °F), Max:37.6 °C (99.7 °F)   Temperature: 36.7 °C (98.1 °F)  Pulse  Av  Min: 88  Max: 99    Blood Pressure : 141/73      Respiratory      Respiration: 16, Pulse Oximetry: 94 %             Physical Exam   Constitutional:   Frail, elderly   HENT:   Head: Normocephalic and atraumatic.   Mouth/Throat: No oropharyngeal exudate.   Eyes: Conjunctivae and EOM are normal. Pupils are equal, round, and reactive to light. No scleral icterus.   Neck: Normal range of motion. Neck supple.   Cardiovascular: Exam reveals no gallop and no friction rub.    No murmur heard.  Pulmonary/Chest: Effort normal and breath sounds normal.   Abdominal: Soft. Bowel sounds are normal.   Musculoskeletal: She exhibits no edema or deformity.   Decreased ROM in left hip   Neurological: No  cranial nerve deficit.   Skin: Skin is warm and dry.   Psychiatric:   Confused   Nursing note and vitals reviewed.      Recent Labs      04/23/18   0520  04/24/18   0533  04/26/18   0030   WBC  8.9  7.8  9.9   RBC  3.86*  3.37*  3.70*   HEMOGLOBIN  12.4  11.0*  12.1   HEMATOCRIT  35.6*  31.4*  35.0*   MCV  92.2  93.2  94.6   MCH  32.1  32.6  32.7   MCHC  34.8  35.0  34.6   RDW  41.9  42.8  44.1   PLATELETCT  208  173  209   MPV  10.8  10.8  10.5     Recent Labs      04/24/18   0533  04/25/18   0335  04/25/18   2330   SODIUM  137  138  137   POTASSIUM  3.5*  3.6  3.6   CHLORIDE  108  107  106   CO2  27  25  26   GLUCOSE  111*  119*  124*   BUN  18  18  26*   CREATININE  0.90  0.83  0.97   CALCIUM  8.2*  8.3*  8.8     Recent Labs      04/24/18   0533  04/25/18   0335  04/25/18   2330   ALTSGPT  12   --   22   ASTSGOT  19   --   36   ALKPHOSPHAT  32   --   41   TBILIRUBIN  0.7   --   0.7   GLUCOSE  111*  119*  124*                 Lab Results   Component Value Date    TROPONINI <0.01 08/21/2012       Imaging  CT Head w/o (4/26)  1. No acute intracranial abnormality. No evidence of acute intracranial hemorrhage or mass lesion.   Assessment/Plan     I anticipate this patient is appropriate for observation status at this time.    Acute encephalopathy- (present on admission)   Assessment & Plan    - unsure of etiology  - hx of recurrent UTIs but UA unremarkable  - CT head unremarkable  - B12/folate/TSH pending  - cont with gentle IVFs and monitor        Closed left hip fracture (CMS-HCC)- (present on admission)   Assessment & Plan    - s/p ORIF; was brought from [facility]  - family requesting new facility  - PT/OT eval pending            VTE prophylaxis: Lovenox.

## 2018-04-26 NOTE — DISCHARGE PLANNING
[facility] has accepted.     Transport has been arranged with Kelly at [facility] for 11:00 am tomorrow. [facility] to provide transport. ULISES Leonardo notified. Awaiting transport form.

## 2018-04-26 NOTE — THERAPY
"Occupational Therapy Evaluation completed.   Functional Status: A&Ox3. Confusion, moderate STM deficits present. Poor attention to task; easily distracted. Poor safety awareness. V/c's needed with tasks. Pt suffered a GLF at home, s/p L hip ORIF on 4/22/18. WBAT. Transferred to [facility] 4/25; pt re-admitted within same day due to combativeness. Pt appears pleasantly confused during this session. Limited new learning anticipated. Performs STS with CGA (low surface), v/c's. LB dressing with CGA. Walks to BR, sink with extra time, v/c's to keep within FWW, CGA. Toileting with SBA, v/c's. Grooming with SBA, v/c's. Walks 150' with CGA, FWW, v/c's. Daughter present during half of session; pt had been indep PTA, driving, shopping, taking baths in tub. Cognition is baseline per daughter.           Plan of Care: Will benefit from Occupational Therapy 2 times per week  Discharge Recommendations:  Equipment: TBA.  Discharge to a transitional care facility for continued skilled therapy services. *Pt is requiring 24/7 supervision for maximal safety. Discussed with daughter who agrees.     See \"Rehab Therapy-Acute\" Patient Summary Report for complete documentation.    "

## 2018-04-26 NOTE — ED TRIAGE NOTES
Chief Complaint   Patient presents with   • Combative     Pt BIB [facility] from [facility] after reportedly being combative, hitting and attempting to bite staff when she would not go back to her bed; pt states she did not want to be bossed around   • Altered Mental Status     Per family, pt is not acting normal; pt is A/Ox4 at this time; per [facility] pt had possbile visual hallucination; pt not hallucinating at this time       Pt BIB [facility] from [facility] after being admitted there for ongoing rehabillitation after undergoing a left hip closed reduction surgery. Pt states the staff was preventing her from ambulating independently when she wanted to independently and did not want their help.

## 2018-04-26 NOTE — THERAPY
"Physical Therapy Evaluation completed.   Bed Mobility:     Transfers: Sit to Stand: Stand by Assist  Gait: Level Of Assist: Stand by Assist with Front-Wheel Walker   X 300 feet    Plan of Care: Will benefit from Physical Therapy 5 times per week  Discharge Recommendations: Equipment: No Equipment Needed. Post-acute therapy Discharge to a transitional care facility for continued skilled therapy services.  Pt is alert and oriented but demonstrates poor judgement would not be safe home alone  See \"Rehab Therapy-Acute\" Patient Summary Report for complete documentation.     "

## 2018-04-27 VITALS
DIASTOLIC BLOOD PRESSURE: 53 MMHG | OXYGEN SATURATION: 100 % | BODY MASS INDEX: 25.61 KG/M2 | HEIGHT: 64 IN | HEART RATE: 87 BPM | TEMPERATURE: 97.9 F | WEIGHT: 150 LBS | SYSTOLIC BLOOD PRESSURE: 117 MMHG | RESPIRATION RATE: 18 BRPM

## 2018-04-27 PROBLEM — G93.40 ACUTE ENCEPHALOPATHY: Status: RESOLVED | Noted: 2018-04-26 | Resolved: 2018-04-27

## 2018-04-27 LAB
ANION GAP SERPL CALC-SCNC: 6 MMOL/L (ref 0–11.9)
BASOPHILS # BLD AUTO: 0.7 % (ref 0–1.8)
BASOPHILS # BLD: 0.05 K/UL (ref 0–0.12)
BUN SERPL-MCNC: 19 MG/DL (ref 8–22)
CALCIUM SERPL-MCNC: 8.6 MG/DL (ref 8.4–10.2)
CHLORIDE SERPL-SCNC: 107 MMOL/L (ref 96–112)
CO2 SERPL-SCNC: 28 MMOL/L (ref 20–33)
CREAT SERPL-MCNC: 0.81 MG/DL (ref 0.5–1.4)
EOSINOPHIL # BLD AUTO: 0.28 K/UL (ref 0–0.51)
EOSINOPHIL NFR BLD: 3.8 % (ref 0–6.9)
ERYTHROCYTE [DISTWIDTH] IN BLOOD BY AUTOMATED COUNT: 43.3 FL (ref 35.9–50)
GLUCOSE SERPL-MCNC: 102 MG/DL (ref 65–99)
HCT VFR BLD AUTO: 34.5 % (ref 37–47)
HGB BLD-MCNC: 12.1 G/DL (ref 12–16)
IMM GRANULOCYTES # BLD AUTO: 0.03 K/UL (ref 0–0.11)
IMM GRANULOCYTES NFR BLD AUTO: 0.4 % (ref 0–0.9)
LYMPHOCYTES # BLD AUTO: 2.06 K/UL (ref 1–4.8)
LYMPHOCYTES NFR BLD: 27.7 % (ref 22–41)
MCH RBC QN AUTO: 32.5 PG (ref 27–33)
MCHC RBC AUTO-ENTMCNC: 35.1 G/DL (ref 33.6–35)
MCV RBC AUTO: 92.7 FL (ref 81.4–97.8)
MONOCYTES # BLD AUTO: 0.57 K/UL (ref 0–0.85)
MONOCYTES NFR BLD AUTO: 7.7 % (ref 0–13.4)
NEUTROPHILS # BLD AUTO: 4.44 K/UL (ref 2–7.15)
NEUTROPHILS NFR BLD: 59.7 % (ref 44–72)
NRBC # BLD AUTO: 0 K/UL
NRBC BLD-RTO: 0 /100 WBC
PLATELET # BLD AUTO: 230 K/UL (ref 164–446)
PMV BLD AUTO: 10.4 FL (ref 9–12.9)
POTASSIUM SERPL-SCNC: 3.6 MMOL/L (ref 3.6–5.5)
RBC # BLD AUTO: 3.72 M/UL (ref 4.2–5.4)
SODIUM SERPL-SCNC: 141 MMOL/L (ref 135–145)
WBC # BLD AUTO: 7.4 K/UL (ref 4.8–10.8)

## 2018-04-27 PROCEDURE — 36415 COLL VENOUS BLD VENIPUNCTURE: CPT

## 2018-04-27 PROCEDURE — 85025 COMPLETE CBC W/AUTO DIFF WBC: CPT

## 2018-04-27 PROCEDURE — G0378 HOSPITAL OBSERVATION PER HR: HCPCS

## 2018-04-27 PROCEDURE — 99217 PR OBSERVATION CARE DISCHARGE: CPT | Performed by: INTERNAL MEDICINE

## 2018-04-27 PROCEDURE — 80048 BASIC METABOLIC PNL TOTAL CA: CPT

## 2018-04-27 RX ORDER — QUETIAPINE FUMARATE 25 MG/1
25 TABLET, FILM COATED ORAL EVERY EVENING
Qty: 60 TAB | Refills: 3
Start: 2018-04-27 | End: 2018-05-30

## 2018-04-27 RX ORDER — OXYCODONE HYDROCHLORIDE 5 MG/1
5 TABLET ORAL EVERY 6 HOURS PRN
Qty: 15 TAB | Refills: 0 | Status: SHIPPED | OUTPATIENT
Start: 2018-04-27 | End: 2018-04-30

## 2018-04-27 ASSESSMENT — PAIN SCALES - GENERAL
PAINLEVEL_OUTOF10: 0

## 2018-04-27 ASSESSMENT — PATIENT HEALTH QUESTIONNAIRE - PHQ9
1. LITTLE INTEREST OR PLEASURE IN DOING THINGS: NOT AT ALL
2. FEELING DOWN, DEPRESSED, IRRITABLE, OR HOPELESS: NOT AT ALL
SUM OF ALL RESPONSES TO PHQ9 QUESTIONS 1 AND 2: 0

## 2018-04-27 NOTE — CARE PLAN
Problem: Discharge Barriers/Planning  Goal: Patient's continuum of care needs will be met  Outcome: PROGRESSING AS EXPECTED  Updated pt on POC, pt close to nurses station, bed/chair alarm on at all times.

## 2018-04-27 NOTE — DISCHARGE SUMMARY
CHIEF COMPLAINT ON ADMISSION  Chief Complaint   Patient presents with   • Combative     Pt BIB [facility] from [facility] after reportedly being combative, hitting and attempting to bite staff when she would not go back to her bed; pt states she did not want to be bossed around   • Altered Mental Status     Per family, pt is not acting normal; pt is A/Ox4 at this time; per [facility] pt had possbile visual hallucination; pt not hallucinating at this time       CODE STATUS  Full Code    HPI & HOSPITAL COURSE  Please see H&P dictated by Dr. Russell. This is a 83 y.o. year old female here with delirium. Patient was recently admitted to her service for left nondisplaced femoral neck fracture. Patient underwent a left hip closed reduction and percutaneous pinning with Dr. Galaviz. She was then discharged to [facility] for ongoing rehabilitation therapy. She was readmitted for evaluation of acute delirium. Patient on admission was alert awake oriented ×3. Patient does not show signs of sepsis. Patient was checked for reversible causes of delirium including vitamin B-12, folate, TSH, RPR which were negative. Patient's HIV test is pending. Patient's mentation was at baseline at the time of discharge per She was evaluated by physical and occupational therapy and is discharged to skilled nursing facility for ongoing  rehabilitation therapy. Patient is weightbearing as tolerated.    Therefore, she is discharged in good and stable condition for further post-acute management.     DISCHARGE PROBLEM LIST  Active Problems:    Closed left hip fracture (CMS-HCC) POA: Yes  Resolved Problems:    Acute encephalopathy POA: Yes      FOLLOW UP  Future Appointments  Date Time Provider Department Center   5/8/2018 11:40 AM MARIUSZ Maria [facility] SHILA Galaviz M.D.  [address]  [facility] [address]  [phone]    Schedule an appointment as soon as possible for a visit in 2 weeks        MEDICATIONS ON DISCHARGE   Ang Lancaster  V   Home Medication Instructions OLAMIDE:[id number]    Printed on:04/27/18 0750   Medication Information                      CALTRATE 600+D PO  Take 1 Tab by mouth every day.             diazePAM (VALIUM) 5 MG Tab  Take 2.5 mg by mouth every day.             enoxaparin (LOVENOX) 40 MG/0.4ML Solution inj  Inject 40 mg as instructed every day.             famotidine (PEPCID) 20 MG TABS  Take 20 mg by mouth PRN.             MULTIVITAL PO  Take 1 Tab by mouth every day.             seroquel 25 MG TAB  Take 25 mg by mouth every evening.    oxyCODONE immediate-release (ROXICODONE) 5 MG Tab  Take 1 Tab by mouth every 6 hours as needed for Severe Pain for up to 3 days.                   DIET  Orders Placed This Encounter   Procedures   • Diet Order     Standing Status:   Standing     Number of Occurrences:   1     Order Specific Question:   Diet:     Answer:   Regular [1]       ACTIVITY  As tolerated and directed by skilled nursing.      LINES, DRAINS, AND WOUNDS  This is an automated list. Peripheral IVs will be removed prior to discharge.  PIV Group Left Wrist 20g Saline Lock (Active)   Line Secured Taped;Transparent 4/26/2018  7:45 PM   Site Condition / Description Assessed;Patent;Clean;Dry;Intact 4/26/2018  7:45 PM   Dressing Type / Description Transparent;Clean;Dry;Intact 4/26/2018  7:45 PM   Dressing Status Observed 4/26/2018  7:45 PM   Saline Locked Yes 4/26/2018  7:45 PM   Infiltration Grading Used by [facility] 0 4/26/2018  7:45 PM   Phlebitis Scale (Used by [facility]) 0 4/26/2018  7:45 PM   Date Primary Tubing Changed 04/26/18 4/26/2018  7:45 PM   NEXT Primary Tubing Change  04/28/18 4/26/2018  7:45 PM       Surgical Incision  Incision Left Hip (Active)   Wound Bed Other (comment) 4/26/2018  7:45 PM   Drainage  None 4/26/2018  7:45 PM   Periwound Skin Normal;Intact 4/26/2018  4:36 AM   Daily - Wound Closure Not Assessed 4/26/2018  7:45 PM   Dressing Options Dry Gauze;Transparent Film 4/26/2018  7:45 PM   Dressing  Status / Change Observed 4/26/2018  7:45 PM                  MENTAL STATUS ON TRANSFER  Level of Consciousness: Alert  Orientation : Oriented x 4 (confused at times)  Speech: Speech Clear    CONSULTATIONS  None    PROCEDURES  None    LABORATORY  Lab Results   Component Value Date/Time    SODIUM 141 04/27/2018 05:00 AM    POTASSIUM 3.6 04/27/2018 05:00 AM    CHLORIDE 107 04/27/2018 05:00 AM    CO2 28 04/27/2018 05:00 AM    GLUCOSE 102 (H) 04/27/2018 05:00 AM    BUN 19 04/27/2018 05:00 AM    CREATININE 0.81 04/27/2018 05:00 AM    CREATININE 0.88 11/28/2012 10:01 AM    GLOMRATE >59 11/19/2010 11:05 AM        Lab Results   Component Value Date/Time    WBC 7.4 04/27/2018 05:00 AM    WBC 5.5 06/08/2011 09:08 AM    HEMOGLOBIN 12.1 04/27/2018 05:00 AM    HEMATOCRIT 34.5 (L) 04/27/2018 05:00 AM    PLATELETCT 230 04/27/2018 05:00 AM      This dictation was created using voice recognition software. The accuracy of the dictation is limited to the abilities of the software. I expect there may be some errors of grammar and possibly content.    Total time of the discharge process exceeds 38 minutes.

## 2018-04-27 NOTE — CARE PLAN
Problem: Venous Thromboembolism (VTW)/Deep Vein Thrombosis (DVT) Prevention:  Goal: Patient will participate in Venous Thrombosis (VTE)/Deep Vein Thrombosis (DVT)Prevention Measures  Outcome: PROGRESSING AS EXPECTED   04/26/18 1945   OTHER   Risk Assessment Score 1   VTE RISK Moderate   Pharmacologic Prophylaxis Used LMWH: Enoxaparin(Lovenox)   Mechanical/VTE Prophylaxis   Mechanical Prophylaxis  SCDs, Sequential Compression Device   SCDs, Sequential Compression Device Refused       Problem: Psychosocial Needs:  Goal: Level of anxiety will decrease  Outcome: PROGRESSING AS EXPECTED  Patient remained calm, frequent reorientation provided to patient.

## 2018-04-27 NOTE — PROGRESS NOTES
Pt's daughter called; updated on current status and plan of discharge to [facility] tomorrow. Pt currently calm, sitting up in chair watching TV.

## 2018-04-27 NOTE — PROGRESS NOTES
Received report from Daphne herring care of the patient. Patient sitting up in chair watching TV. A&Ox4. States no needs at this time.

## 2018-04-27 NOTE — DISCHARGE INSTRUCTIONS
Discharge Instructions    Discharged to other by medical transportation with escort. Discharged via wheelchair, hospital escort: Yes.  Special equipment needed: Not Applicable    Be sure to schedule a follow-up appointment with your primary care doctor or any specialists as instructed.     Discharge Plan:   Influenza Vaccine Indication: Not indicated: Previously immunized this influenza season and > 8 years of age    I understand that a diet low in cholesterol, fat, and sodium is recommended for good health. Unless I have been given specific instructions below for another diet, I accept this instruction as my diet prescription.   Other diet: Regular    Special Instructions: None    · Is patient discharged on Warfarin / Coumadin?   No     Depression / Suicide Risk    As you are discharged from this [facility] facility, it is important to learn how to keep safe from harming yourself.    Recognize the warning signs:  · Abrupt changes in personality, positive or negative- including increase in energy   · Giving away possessions  · Change in eating patterns- significant weight changes-  positive or negative  · Change in sleeping patterns- unable to sleep or sleeping all the time   · Unwillingness or inability to communicate  · Depression  · Unusual sadness, discouragement and loneliness  · Talk of wanting to die  · Neglect of personal appearance   · Rebelliousness- reckless behavior  · Withdrawal from people/activities they love  · Confusion- inability to concentrate     If you or a loved one observes any of these behaviors or has concerns about self-harm, here's what you can do:  · Talk about it- your feelings and reasons for harming yourself  · Remove any means that you might use to hurt yourself (examples: pills, rope, extension cords, firearm)  · Get professional help from the community (Mental Health, Substance Abuse, psychological counseling)  · Do not be alone:Call your Safe Contact- someone whom you trust who  will be there for you.  · Call your local CRISIS HOTLINE 342-3129 or 700-931-4071  · Call your local Children's Mobile Crisis Response Team [place] (309) 308-9003 or www.[employer]  · Call the toll free National Suicide Prevention Hotlines   · National Suicide Prevention Lifeline 321-398-GSAA (5538)  · National CardiaLen Line Network 800-SUICIDE (541-5189)

## 2018-04-27 NOTE — PROGRESS NOTES
Received bedside report from PATRIC Gifford; Pt oriented x3 with intermittent confusion. Pt denies pain at this time. Bed alarm on, pt close to nurses station.

## 2018-04-27 NOTE — PROGRESS NOTES
"Pt becoming agitated and verbally abusive toward staff. Pt attempting to leave. Daughter called and tranferred into room to speak with pt. Pt continually states that \"she wants to leave.\" Will not stay in room and refuses to use walker. Family updated who states they want patient to stay in hospital and do not think it is safe for pt to leave. Pt does not seem mentally able to make decision to leave AMA. Hospitalist paged for update.   "

## 2018-04-27 NOTE — DISCHARGE PLANNING
MITALI called [facility] and informed family of discharge plan for today.  MITALI completed COBRA form and will provide to Floor RN.

## 2018-04-27 NOTE — PROGRESS NOTES
Report called and given to Taylor at [facility]. Call back number given, all questions answered.

## 2018-04-29 LAB — HIV 1 PRO DNA BLD QL NAA+PROBE: NOT DETECTED

## 2018-05-10 LAB — EKG IMPRESSION: NORMAL

## 2018-05-11 NOTE — ADDENDUM NOTE
Encounter addended by: Alexandre Ramos M.D. on: 5/11/2018  4:00 AM<BR>    Actions taken: Sign clinical note

## 2018-05-11 NOTE — ED PROVIDER NOTES
ED Provider Note    CHIEF COMPLAINT  Chief Complaint   Patient presents with   • Combative     Pt MAR[facility]A COLLINS from [facility] after reportedly being combative, hitting and attempting to bite staff when she would not go back to her bed; pt states she did not want to be bossed around   • Altered Mental Status     Per family, pt is not acting normal; pt is A/Ox4 at this time; per [facility] pt had possbile visual hallucination; pt not hallucinating at this time       HPI  Ang Lancaster is a 83 y.o. female who presents with combative behavior. Patient was sent from nursing home for further evaluation per family's request. Family reports that upon their arrival to the nursing home patient was altered, she was very paranoid about staff. Do report upon presenting to the emergency department her symptoms have improved. Patient reports ongoing hip pain since her surgery. She denies any fevers or constitutional symptoms. She denies any worsening pain. She denies any recent falls.    REVIEW OF SYSTEMS  ROS  See HPI for further details. All other systems are negative.     PAST MEDICAL HISTORY   has a past medical history of Degenerative joint disease; [place] (hard of hearing); IFG (impaired fasting glucose); Impaired fasting glucose; Meige syndrome (blepharospasm with oromandibular dystonia); Osteoporosis; Recurrent UTI; Risk for falls (10/11/2017); and Vertigo.    SOCIAL HISTORY  Social History     Social History Main Topics   • Smoking status: Former Smoker     Packs/day: 1.50     Years: 45.00     Types: Cigarettes     Quit date: 1/1/2004   • Smokeless tobacco: Never Used   • Alcohol use 3.5 - 17.5 oz/week     7 - 35 Glasses of wine per week   • Drug use: Yes      Comment: CBD oil   • Sexual activity: Not on file       SURGICAL HISTORY   has a past surgical history that includes tubal coagulation laparoscopic bilateral (1973) and hip cannulated screw (Left, 4/22/2018).    CURRENT MEDICATIONS  Home Medications     Reviewed by Suzy BRYAN  STACI Eddy (Registered Nurse) on 04/27/18 at 1010  Med List Status: <None>   Medication Last Dose Status   CALTRATE 600+D PO > 2 weeks Active   diazePAM (VALIUM) 5 MG Tab 4/22/2018 Active   enoxaparin (LOVENOX) 40 MG/0.4ML Solution inj  Active   famotidine (PEPCID) 20 MG TABS > 2 days Active   MULTIVITAL PO > 2 weeks Active                ALLERGIES  Allergies   Allergen Reactions   • Nkda [No Known Drug Allergy]    • Other Environmental Runny Nose and Itching     pollens       PHYSICAL EXAM  Physical Exam   Constitutional: She appears well-developed and well-nourished.   HENT:   Head: Normocephalic and atraumatic.   Eyes: Conjunctivae are normal. Pupils are equal, round, and reactive to light.   Neck: Normal range of motion. Neck supple.   Cardiovascular: Normal rate and regular rhythm.    Pulmonary/Chest: Effort normal and breath sounds normal.   Abdominal: Soft. Bowel sounds are normal. She exhibits no distension. There is no tenderness. There is no rebound.   Musculoskeletal:   Postsurgical scars are healing well, there is no surrounding erythema or induration. Able to range the hip passively. Distal pulses are 2+ and cap refill is instantaneous. Sensation intact throughout.   Skin: Skin is warm and dry.         COURSE & MEDICAL DECISION MAKING  Pertinent Labs & Imaging studies reviewed. (See chart for details)  Patient here with symptoms consistent with delirium given her waxing and waning mental status. This is likely secondary to pain but will check for possible alternative organic causes. Family does not feel comfortable with patient returning to the nursing home and therefore patient will be admitted for ongoing workup into possible organic etiology for her delirium in for re-placement.  The patient will not drink alcohol nor drive with prescribed medications. The patient will return for worsening symptoms and is stable at the time of discharge. The patient verbalizes understanding and will  comply.    FINAL IMPRESSION  1. Delirium         Electronically signed by: Alexandre Ramos, 5/11/2018 3:56 AM

## 2018-05-30 ENCOUNTER — OFFICE VISIT (OUTPATIENT)
Dept: MEDICAL GROUP | Facility: MEDICAL CENTER | Age: 83
End: 2018-05-30
Payer: MEDICARE

## 2018-05-30 VITALS
OXYGEN SATURATION: 93 % | SYSTOLIC BLOOD PRESSURE: 110 MMHG | WEIGHT: 127 LBS | HEART RATE: 81 BPM | DIASTOLIC BLOOD PRESSURE: 68 MMHG | BODY MASS INDEX: 21.68 KG/M2 | HEIGHT: 64 IN | TEMPERATURE: 99.2 F

## 2018-05-30 DIAGNOSIS — S72.002A CLOSED FRACTURE DISLOCATION OF LEFT HIP JOINT, INITIAL ENCOUNTER (HCC): ICD-10-CM

## 2018-05-30 DIAGNOSIS — D50.8 OTHER IRON DEFICIENCY ANEMIA: ICD-10-CM

## 2018-05-30 DIAGNOSIS — M81.0 POST-MENOPAUSAL OSTEOPOROSIS: ICD-10-CM

## 2018-05-30 PROCEDURE — 99214 OFFICE O/P EST MOD 30 MIN: CPT | Performed by: NURSE PRACTITIONER

## 2018-05-30 RX ORDER — ALENDRONATE SODIUM 70 MG/1
70 TABLET ORAL
Qty: 4 TAB | Refills: 2 | Status: SHIPPED | OUTPATIENT
Start: 2018-05-30 | End: 2018-06-26 | Stop reason: SDUPTHER

## 2018-05-31 NOTE — PROGRESS NOTES
Subjective:     Ang Lancaster is a 83 y.o. female who presents with aftter fx hip.    HPI:   Seen in f/u after returning home from rehab after fx left hip. She was just released from [facility].  Using walker.  Fell and fx her hip.  She is getting PT at home.   Her cbc in hosp s howed anemia.  We will recheck   Her last dexa scan showed osteoporosis.  She is now ready to take med.    She is having swelling in feet during hosp and now.  No SOB.      Patient Active Problem List    Diagnosis Date Noted   • Hyponatremia 04/23/2018   • Closed left hip fracture (HCC) 04/22/2018   • Risk for falls 10/11/2017   • Osteoporosis    • Recurrent UTI    • Meige syndrome (blepharospasm with oromandibular dystonia)    • Degenerative joint disease    • Impaired fasting glucose    • Vertigo 03/20/2012   • Preventative health care 11/10/2009   • [place] (hard of hearing)        Current medicines (including changes today)  Current Outpatient Prescriptions   Medication Sig Dispense Refill   • alendronate (FOSAMAX) 70 MG Tab Take 1 Tab by mouth every 7 days. 4 Tab 2   • diazePAM (VALIUM) 5 MG Tab Take 2.5 mg by mouth every day.     • MULTIVITAL PO Take 1 Tab by mouth every day.     • CALTRATE 600+D PO Take 1 Tab by mouth every day.     • famotidine (PEPCID) 20 MG TABS Take 20 mg by mouth PRN.       No current facility-administered medications for this visit.        Allergies   Allergen Reactions   • Nkda [No Known Drug Allergy]    • Other Environmental Runny Nose and Itching     pollens       ROS  Constitutional: Negative. Negative for fever, chills, weight loss, malaise/fatigue and diaphoresis.   HENT: Negative. Negative for hearing loss, ear pain, nosebleeds, congestion, sore throat, neck pain, tinnitus and ear discharge.   Respiratory: Negative. Negative for cough, hemoptysis, sputum production, shortness of breath, wheezing and stridor.   Cardiovascular: Negative. Negative for chest pain, palpitations, orthopnea, claudication, leg  "swelling and PND.   Gastrointestinal: Denies nausea, vomiting, diarrhea, heartburn, melena or hematochezia.  Genitourinary: Denies dysuria, hematuria, urinary incontinence, frequency or urgency.        Objective:     Blood pressure 110/68, pulse 81, temperature 37.3 °C (99.2 °F), height 1.626 m (5' 4\"), weight 57.6 kg (127 lb), SpO2 93 %, not currently breastfeeding. Body mass index is 21.8 kg/m².    Physical Exam:  Vitals reviewed.  Constitutional: Oriented to person, place, and time. appears well-developed and well-nourished. No distress.   Cardiovascular: Normal rate, regular rhythm, normal heart sounds and intact distal pulses. Exam reveals no gallop and no friction rub. No murmur heard. No carotid bruits.   Chronic facial tremors.   Pulmonary/Chest: Effort normal and breath sounds normal. No stridor. No respiratory distress. no wheezes or rales. exhibits no tenderness.   Musculoskeletal:  exhibits 2+ marlyn pedal edema. marlyn pedal pulses 2+.  Lymphadenopathy: No cervical or supraclavicular adenopathy.   Neurological: Alert and oriented to person, place, and time. exhibits normal muscle tone.  amb with walker slowly.    Skin: Skin is warm and dry. No diaphoresis. Left hip incision healed w/o erythema,dg or edema  Psychiatric: Normal mood and affect. Behavior is normal.      Assessment and Plan:     The following treatment plan was discussed:    1. Closed fracture dislocation of left hip joint, initial encounter ([facility])      home now from rehab.  has [facility] w/PT.  recovering well.     2. Other iron deficiency anemia  CBC WITH DIFFERENTIAL    was anemic in hosp.  willr echeck lab in 1 month.  f/u for review   3. Post-menopausal osteoporosis  alendronate (FOSAMAX) 70 MG Tab    start fosamax weekly.     Reviewed risks and benefits of fosamax with pt and daughter including but not limited to: gerd, gastritis.           Followup: Return in about 4 weeks (around 6/27/2018).  "

## 2018-06-20 ENCOUNTER — OFFICE VISIT (OUTPATIENT)
Dept: URGENT CARE | Facility: CLINIC | Age: 83
End: 2018-06-20
Payer: MEDICARE

## 2018-06-20 ENCOUNTER — APPOINTMENT (OUTPATIENT)
Dept: RADIOLOGY | Facility: IMAGING CENTER | Age: 83
End: 2018-06-20
Attending: PHYSICIAN ASSISTANT
Payer: MEDICARE

## 2018-06-20 ENCOUNTER — HOSPITAL ENCOUNTER (OUTPATIENT)
Dept: LAB | Facility: MEDICAL CENTER | Age: 83
End: 2018-06-20
Attending: NURSE PRACTITIONER
Payer: MEDICARE

## 2018-06-20 VITALS
RESPIRATION RATE: 14 BRPM | OXYGEN SATURATION: 96 % | BODY MASS INDEX: 21.68 KG/M2 | DIASTOLIC BLOOD PRESSURE: 78 MMHG | HEIGHT: 64 IN | WEIGHT: 127 LBS | HEART RATE: 85 BPM | TEMPERATURE: 97 F | SYSTOLIC BLOOD PRESSURE: 110 MMHG

## 2018-06-20 DIAGNOSIS — R73.01 IFG (IMPAIRED FASTING GLUCOSE): ICD-10-CM
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
## 2018-06-20 DIAGNOSIS — S63.642A SPRAIN OF METACARPOPHALANGEAL (MCP) JOINT OF LEFT THUMB, INITIAL ENCOUNTER: ICD-10-CM

## 2018-06-20 DIAGNOSIS — S01.81XA FOREHEAD LACERATION, INITIAL ENCOUNTER: Primary | ICD-10-CM

## 2018-06-20 DIAGNOSIS — D50.8 OTHER IRON DEFICIENCY ANEMIA: ICD-10-CM

## 2018-06-20 LAB
BASOPHILS # BLD AUTO: 1.5 % (ref 0–1.8)
BASOPHILS # BLD: 0.1 K/UL (ref 0–0.12)
CHOLEST SERPL-MCNC: 155 MG/DL (ref 100–199)
CREAT UR-MCNC: 254.6 MG/DL
EOSINOPHIL # BLD AUTO: 0.1 K/UL (ref 0–0.51)
EOSINOPHIL NFR BLD: 1.5 % (ref 0–6.9)
ERYTHROCYTE [DISTWIDTH] IN BLOOD BY AUTOMATED COUNT: 45.9 FL (ref 35.9–50)
HCT VFR BLD AUTO: 41.3 % (ref 37–47)
HDLC SERPL-MCNC: 98 MG/DL
HGB BLD-MCNC: 13.7 G/DL (ref 12–16)
IMM GRANULOCYTES # BLD AUTO: 0.03 K/UL (ref 0–0.11)
IMM GRANULOCYTES NFR BLD AUTO: 0.5 % (ref 0–0.9)
LDLC SERPL CALC-MCNC: 50 MG/DL
LYMPHOCYTES # BLD AUTO: 1.2 K/UL (ref 1–4.8)
LYMPHOCYTES NFR BLD: 18.4 % (ref 22–41)
MCH RBC QN AUTO: 31.9 PG (ref 27–33)
MCHC RBC AUTO-ENTMCNC: 33.2 G/DL (ref 33.6–35)
MCV RBC AUTO: 96 FL (ref 81.4–97.8)
MICROALBUMIN UR-MCNC: 2.2 MG/DL
MICROALBUMIN/CREAT UR: 9 MG/G (ref 0–30)
MONOCYTES # BLD AUTO: 0.44 K/UL (ref 0–0.85)
MONOCYTES NFR BLD AUTO: 6.7 % (ref 0–13.4)
NEUTROPHILS # BLD AUTO: 4.66 K/UL (ref 2–7.15)
NEUTROPHILS NFR BLD: 71.4 % (ref 44–72)
NRBC # BLD AUTO: 0 K/UL
NRBC BLD-RTO: 0 /100 WBC
PLATELET # BLD AUTO: 270 K/UL (ref 164–446)
PMV BLD AUTO: 10.1 FL (ref 9–12.9)
RBC # BLD AUTO: 4.3 M/UL (ref 4.2–5.4)
TRIGL SERPL-MCNC: 36 MG/DL (ref 0–149)
WBC # BLD AUTO: 6.5 K/UL (ref 4.8–10.8)

## 2018-06-20 PROCEDURE — 12013 RPR F/E/E/N/L/M 2.6-5.0 CM: CPT | Performed by: PHYSICIAN ASSISTANT

## 2018-06-20 PROCEDURE — 73130 X-RAY EXAM OF HAND: CPT | Mod: TC,FY,LT | Performed by: PHYSICIAN ASSISTANT

## 2018-06-20 PROCEDURE — 85025 COMPLETE CBC W/AUTO DIFF WBC: CPT

## 2018-06-20 PROCEDURE — 82043 UR ALBUMIN QUANTITATIVE: CPT

## 2018-06-20 PROCEDURE — 82570 ASSAY OF URINE CREATININE: CPT

## 2018-06-20 PROCEDURE — 80061 LIPID PANEL: CPT | Mod: GA

## 2018-06-20 PROCEDURE — 36415 COLL VENOUS BLD VENIPUNCTURE: CPT | Mod: GA

## 2018-06-20 RX ORDER — SULFAMETHOXAZOLE AND TRIMETHOPRIM 800; 160 MG/1; MG/1
1 TABLET ORAL 2 TIMES DAILY
Qty: 10 TAB | Refills: 0 | Status: SHIPPED | OUTPATIENT
Start: 2018-06-20 | End: 2018-06-25

## 2018-06-20 NOTE — PATIENT INSTRUCTIONS
Facial Laceration  A facial laceration is a cut on the face. These injuries can be painful and cause bleeding. Some cuts may need to be closed with stitches (sutures), skin adhesive strips, or wound glue. Cuts usually heal quickly but can leave a scar. It can take 1-2 years for the scar to go away completely.  Follow these instructions at home:  · Only take medicines as told by your doctor.  · Follow your doctor's instructions for wound care.  For Stitches:  · Keep the cut clean and dry.  · If you have a bandage (dressing), change it at least once a day. Change the bandage if it gets wet or dirty, or as told by your doctor.  · Wash the cut with soap and water 2 times a day. Rinse the cut with water. Pat it dry with a clean towel.  · Put a thin layer of medicated cream on the cut as told by your doctor.  · You may shower after the first 24 hours. Do not soak the cut in water until the stitches are removed.  · Have your stitches removed as told by your doctor.  · Do not wear any makeup until a few days after your stitches are removed.  For Skin Adhesive Strips:  · Keep the cut clean and dry.  · Do not get the strips wet. You may take a bath, but be careful to keep the cut dry.  · If the cut gets wet, pat it dry with a clean towel.  · The strips will fall off on their own. Do not remove the strips that are still stuck to the cut.  For Wound Glue:  · You may shower or take baths. Do not soak or scrub the cut. Do not swim. Avoid heavy sweating until the glue falls off on its own. After a shower or bath, pat the cut dry with a clean towel.  · Do not put medicine or makeup on your cut until the glue falls off.  · If you have a bandage, do not put tape over the glue.  · Avoid lots of sunlight or tanning lamps until the glue falls off.  · The glue will fall off on its own in 5-10 days. Do not pick at the glue.  After Healing:  · Put sunscreen on the cut for the first year to reduce your scar.  Contact a doctor if:  · You  have a fever.  Get help right away if:  · Your cut area gets red, painful, or puffy (swollen).  · You see a yellowish-white fluid (pus) coming from the cut.  This information is not intended to replace advice given to you by your health care provider. Make sure you discuss any questions you have with your health care provider.  Document Released: 06/05/2009 Document Revised: 05/25/2017 Document Reviewed: 07/31/2014  [employer] Interactive Patient Education © 2017 [employer]

## 2018-06-26 ENCOUNTER — OFFICE VISIT (OUTPATIENT)
Dept: MEDICAL GROUP | Facility: MEDICAL CENTER | Age: 83
End: 2018-06-26
Payer: MEDICARE

## 2018-06-26 VITALS
TEMPERATURE: 99.6 F | HEART RATE: 93 BPM | DIASTOLIC BLOOD PRESSURE: 60 MMHG | HEIGHT: 64 IN | BODY MASS INDEX: 21 KG/M2 | WEIGHT: 123 LBS | SYSTOLIC BLOOD PRESSURE: 108 MMHG | OXYGEN SATURATION: 93 %

## 2018-06-26 DIAGNOSIS — R29.6 FALLS FREQUENTLY: ICD-10-CM

## 2018-06-26 DIAGNOSIS — R63.4 WEIGHT LOSS, NON-INTENTIONAL: ICD-10-CM

## 2018-06-26 DIAGNOSIS — R60.0 PEDAL EDEMA: ICD-10-CM

## 2018-06-26 DIAGNOSIS — S01.81XA LACERATION OF FOREHEAD, INITIAL ENCOUNTER: ICD-10-CM

## 2018-06-26 DIAGNOSIS — M81.0 POST-MENOPAUSAL OSTEOPOROSIS: ICD-10-CM

## 2018-06-26 PROCEDURE — 99214 OFFICE O/P EST MOD 30 MIN: CPT | Performed by: NURSE PRACTITIONER

## 2018-06-26 RX ORDER — ALENDRONATE SODIUM 70 MG/1
70 TABLET ORAL
Qty: 4 TAB | Refills: 2 | Status: SHIPPED | OUTPATIENT
Start: 2018-06-26 | End: 2018-07-26

## 2018-06-26 NOTE — PROGRESS NOTES
Subjective:     Ang Lancaster is a 83 y.o. female who presents with fall.    HPI:   Seen in f/u after fall and ER visit.  She hit her head and has a laceration on left forhead. She is due to remove stitches.  She fell getting out of car.  Not getting PT any more.  Has had several falls including the one with fx hip.  She is using the walker.  Daughter is trying to get her to move to assisted living.  ptis declining so far.  i discussed risks of falling when alone.  She will at least get life line necklace.   She needs refill on fosamax.  Stable on meds for her osteoporosis.    Reviewed lab with pt and daughter.  CBC, ALB/CR RATIO, LP is wnl.   She has lost 4 lbs since last visit.  She is eating 3 meals daily.    She is having pedal edema.  Not bothering her and not painful.  She is not wearing SVETLANA hose.  Trying to drink adequate water.  Not limiting na.          Patient Active Problem List    Diagnosis Date Noted   • Hyponatremia 04/23/2018   • Closed left hip fracture (HCC) 04/22/2018   • Risk for falls 10/11/2017   • Osteoporosis    • Recurrent UTI    • Meige syndrome (blepharospasm with oromandibular dystonia)    • Degenerative joint disease    • Impaired fasting glucose    • Vertigo 03/20/2012   • Preventative health care 11/10/2009   • [place] (hard of hearing)        Current medicines (including changes today)  Current Outpatient Prescriptions   Medication Sig Dispense Refill   • alendronate (FOSAMAX) 70 MG Tab Take 1 Tab by mouth every 7 days. 4 Tab 2   • diazePAM (VALIUM) 5 MG Tab Take 2.5 mg by mouth every day.     • famotidine (PEPCID) 20 MG TABS Take 20 mg by mouth PRN.     • MULTIVITAL PO Take 1 Tab by mouth every day.     • CALTRATE 600+D PO Take 1 Tab by mouth every day.       No current facility-administered medications for this visit.        Allergies   Allergen Reactions   • Nkda [No Known Drug Allergy]    • Other Environmental Runny Nose and Itching     pollens       ROS  Constitutional: Negative.  "Negative for fever, chills, weight loss, malaise/fatigue and diaphoresis.   HENT: Negative. Negative for hearing loss, ear pain, nosebleeds, congestion, sore throat, neck pain, tinnitus and ear discharge.   Respiratory: Negative. Negative for cough, hemoptysis, sputum production, shortness of breath, wheezing and stridor.   Cardiovascular: Negative. Negative for chest pain, palpitations, orthopnea, claudication, leg swelling and PND.   Gastrointestinal: Denies nausea, vomiting, diarrhea, constipation, heartburn, melena or hematochezia.  Genitourinary: Denies dysuria, hematuria, urinary incontinence, frequency or urgency.        Objective:     Blood pressure 108/60, pulse 93, temperature 37.6 °C (99.6 °F), height 1.626 m (5' 4\"), weight 55.8 kg (123 lb), SpO2 93 %, not currently breastfeeding. Body mass index is 21.11 kg/m².    Physical Exam:  Vitals reviewed.  Constitutional: Oriented to person, place, and time. appears well-developed and well-nourished. No distress.   Cardiovascular: Normal rate, regular rhythm, normal heart sounds and intact distal pulses. Exam reveals no gallop and no friction rub. No murmur heard. No carotid bruits.   Pulmonary/Chest: Effort normal and breath sounds normal. No stridor. No respiratory distress. no wheezes or rales. exhibits no tenderness.   Musculoskeletal: amb slowlu with walker.   exhibits 1+ marlyn pedal edema. marlyn pedal pulses 2+.  Lymphadenopathy: No cervical or supraclavicular adenopathy.   Neurological: Alert and oriented to person, place, and time. exhibits normal muscle tone.  Skin: Skin is warm and dry. No diaphoresis.   Psychiatric: Normal mood and affect. Behavior is normal.      Assessment and Plan:     The following treatment plan was discussed:    1. Post-menopausal osteoporosis  alendronate (FOSAMAX) 70 MG Tab    refilled fosamax and continue taking weekly.  mark well   2. Falls frequently     3. Laceration of forehead, initial encounter      stitches removed after " cleanse with alcohol.  steri strips applied.  f/u if sx of infection occur:  reddness, swelling, dg, warmth, fever.     4. Weight loss, non-intentional      eat 3 meals daily.  add at lease 2 cans boost or ensure daily.  f/u 1 month for weight check unless gaining wt.  then f/u 3 months.     5. Pedal edema      no pain.  not bothering her.  no tx at this tiem         Followup: Return in about 4 weeks (around 7/24/2018).

## 2018-07-26 ENCOUNTER — OFFICE VISIT (OUTPATIENT)
Dept: MEDICAL GROUP | Facility: MEDICAL CENTER | Age: 83
End: 2018-07-26
Payer: MEDICARE

## 2018-07-26 VITALS
HEART RATE: 75 BPM | WEIGHT: 122 LBS | OXYGEN SATURATION: 96 % | BODY MASS INDEX: 20.83 KG/M2 | SYSTOLIC BLOOD PRESSURE: 110 MMHG | HEIGHT: 64 IN | TEMPERATURE: 98.5 F | DIASTOLIC BLOOD PRESSURE: 62 MMHG

## 2018-07-26 DIAGNOSIS — R60.0 PEDAL EDEMA: ICD-10-CM

## 2018-07-26 DIAGNOSIS — R63.4 WEIGHT LOSS, NON-INTENTIONAL: ICD-10-CM

## 2018-07-26 PROCEDURE — 99214 OFFICE O/P EST MOD 30 MIN: CPT | Performed by: NURSE PRACTITIONER

## 2018-07-26 NOTE — PROGRESS NOTES
Subjective:     Ang Lancaster is a 83 y.o. female who presents with nonintentional weight loss..    HPI:   Seen in f/u for nonintentional weight loss.  She has been eating 3 meals and snacks.  She had lost 4 lbs over the previous month.  Now lost only 1 lb over the last month.  She has stopped the fosamax.  She was only on for abotu 3 months.  She thinks that the swelling is r/t her fosamax.  Since she stopped the med her swelling is sl better.  She still having swelling but its decreasing at nite.          Patient Active Problem List    Diagnosis Date Noted   • Hyponatremia 04/23/2018   • Closed left hip fracture (HCC) 04/22/2018   • Risk for falls 10/11/2017   • Osteoporosis    • Recurrent UTI    • Meige syndrome (blepharospasm with oromandibular dystonia)    • Degenerative joint disease    • Impaired fasting glucose    • Vertigo 03/20/2012   • Preventative health care 11/10/2009   • [place] (hard of hearing)        Current medicines (including changes today)  Current Outpatient Prescriptions   Medication Sig Dispense Refill   • diazePAM (VALIUM) 5 MG Tab Take 2.5 mg by mouth every day.     • famotidine (PEPCID) 20 MG TABS Take 20 mg by mouth PRN.     • MULTIVITAL PO Take 1 Tab by mouth every day.     • CALTRATE 600+D PO Take 1 Tab by mouth every day.       No current facility-administered medications for this visit.        Allergies   Allergen Reactions   • Nkda [No Known Drug Allergy]    • Other Environmental Runny Nose and Itching     pollens       ROS  Constitutional: Negative. Negative for fever, chills, weight loss, malaise/fatigue and diaphoresis.   HENT: Negative. Negative for hearing loss, ear pain, nosebleeds, congestion, sore throat, neck pain, tinnitus and ear discharge.   Respiratory: Negative. Negative for cough, hemoptysis, sputum production, shortness of breath, wheezing and stridor.   Cardiovascular: Negative. Negative for chest pain, palpitations, orthopnea, claudication, leg swelling and PND.  "  Gastrointestinal: Denies nausea, vomiting, diarrhea, constipation, heartburn, melena or hematochezia.  Genitourinary: Denies dysuria, hematuria, urinary incontinence, frequency or urgency.        Objective:     Blood pressure 110/62, pulse 75, temperature 36.9 °C (98.5 °F), height 1.626 m (5' 4\"), weight 55.3 kg (122 lb), SpO2 96 %, not currently breastfeeding. Body mass index is 20.94 kg/m².    Physical Exam:  Vitals reviewed.  Constitutional: Oriented to person, place, and time. appears well-developed and well-nourished. No distress.   Cardiovascular: Normal rate, regular rhythm, normal heart sounds and intact distal pulses. Exam reveals no gallop and no friction rub. No murmur heard. No carotid bruits.   Pulmonary/Chest: Effort normal and breath sounds normal. No stridor. No respiratory distress. no wheezes or rales. exhibits no tenderness.   Musculoskeletal: amb with walker slowly. exhibits 2+ marlyn ped edema. marlyn pedal pulses 2+.  Lymphadenopathy: No cervical or supraclavicular adenopathy.   Neurological: Alert and oriented to person, place, and time. exhibits normal muscle tone.  Skin: Skin is warm and dry. No diaphoresis.   Psychiatric: Normal mood and affect. Behavior is normal.      Assessment and Plan:     The following treatment plan was discussed:    1. Weight loss, non-intentional      down one lb only.  eating ok.  will continue with 3 complete meals daily and multiple snacks.  f/u 3 months.    2. Pedal edema      off fosamax with some improvement.  keep feet elevated with sitting. low na diet.           Followup: Return in about 3 months (around 10/26/2018).  "

## 2018-10-30 ENCOUNTER — OFFICE VISIT (OUTPATIENT)
Dept: MEDICAL GROUP | Facility: MEDICAL CENTER | Age: 83
End: 2018-10-30
Payer: MEDICARE

## 2018-10-30 VITALS
HEIGHT: 64 IN | SYSTOLIC BLOOD PRESSURE: 116 MMHG | TEMPERATURE: 98.6 F | DIASTOLIC BLOOD PRESSURE: 60 MMHG | OXYGEN SATURATION: 98 % | BODY MASS INDEX: 21.51 KG/M2 | WEIGHT: 126 LBS | HEART RATE: 71 BPM

## 2018-10-30 DIAGNOSIS — Z12.11 COLON CANCER SCREENING: ICD-10-CM

## 2018-10-30 DIAGNOSIS — H53.9 VISION CHANGES: ICD-10-CM

## 2018-10-30 DIAGNOSIS — G24.4 MEIGE SYNDROME (BLEPHAROSPASM WITH OROMANDIBULAR DYSTONIA): ICD-10-CM

## 2018-10-30 DIAGNOSIS — Z23 NEED FOR INFLUENZA VACCINATION: ICD-10-CM

## 2018-10-30 PROCEDURE — G0008 ADMIN INFLUENZA VIRUS VAC: HCPCS | Performed by: NURSE PRACTITIONER

## 2018-10-30 PROCEDURE — 90662 IIV NO PRSV INCREASED AG IM: CPT | Performed by: NURSE PRACTITIONER

## 2018-10-30 PROCEDURE — 99214 OFFICE O/P EST MOD 30 MIN: CPT | Mod: 25 | Performed by: NURSE PRACTITIONER

## 2018-10-30 RX ORDER — DIAZEPAM 5 MG/1
5 TABLET ORAL DAILY
Qty: 30 TAB | Refills: 3 | Status: SHIPPED | OUTPATIENT
Start: 2018-10-30 | End: 2019-04-25 | Stop reason: SDUPTHER

## 2018-10-30 NOTE — NON-PROVIDER
"  Subjective:     Ang Lancaster is a 83 y.o. female who presents with vision changes.    HPI: Pt has been taking Valium 5mg tab (1/2 tab/day) for many years. They are normally \"pink\" but her last refill from [facility] were yellow.     She says the yellow pills do not work on her anxiety. Since she started taking them she has noticed difficulty focusing with her eyes. She has a hard time reading and has to use a magnifying glass. Her eyes also twitch. This is brand new for her.     of yellow pills is [employer]. Suspected pink pill mfr (using [employer] pill pictures) is [employer].    Her last eye exam was one year ago and her next eye exam is in December. She is not sure who her eye doctor is at this time.    She is due colon cancer screening with FIT.    She would like flu shot today.        Patient Active Problem List    Diagnosis Date Noted   • Hyponatremia 04/23/2018   • Closed left hip fracture (HCC) 04/22/2018   • Risk for falls 10/11/2017   • Osteoporosis    • Recurrent UTI    • Meige syndrome (blepharospasm with oromandibular dystonia)    • Degenerative joint disease    • Impaired fasting glucose    • Vertigo 03/20/2012   • Preventative health care 11/10/2009   • [place] (hard of hearing)        Current medicines (including changes today)  Current Outpatient Prescriptions   Medication Sig Dispense Refill   • diazePAM (VALIUM) 5 MG Tab Take 1 Tab by mouth every day for 30 days. 30 Tab 3   • MULTIVITAL PO Take 1 Tab by mouth every day.     • famotidine (PEPCID) 20 MG TABS Take 20 mg by mouth PRN.     • CALTRATE 600+D PO Take 1 Tab by mouth every day.       No current facility-administered medications for this visit.        Allergies   Allergen Reactions   • Nkda [No Known Drug Allergy]    • Other Environmental Runny Nose and Itching     pollens       ROS  Constitutional: Negative. Negative for fever, chills, weight loss, malaise/fatigue and diaphoresis.   HENT: Negative. Negative for hearing loss, ear " "pain, nosebleeds, congestion, sore throat, neck pain, tinnitus and ear discharge.   Respiratory: Negative. Negative for hemoptysis, sputum production, shortness of breath, wheezing and stridor.   Cardiovascular: Negative. Negative for chest pain, palpitations, orthopnea, claudication, leg swelling and PND.   Gastrointestinal: Denies nausea, vomiting, diarrhea, heartburn, melena or hematochezia.  Genitourinary: Denies dysuria, hematuria, urinary incontinence, frequency or urgency.        Objective:     Blood pressure 116/60, pulse 71, temperature 37 °C (98.6 °F), temperature source Temporal, height 1.626 m (5' 4\"), weight 57.2 kg (126 lb), SpO2 98 %, not currently breastfeeding. Body mass index is 21.63 kg/m².    Physical Exam:  Vitals reviewed.  Constitutional: Oriented to person, place, and time. appears well-developed and well-nourished. No distress.   Cardiovascular: Normal rate, regular rhythm, normal heart sounds and intact distal pulses. Exam reveals no gallop and no friction rub. No murmur heard. No carotid bruits.   Pulmonary/Chest: Effort normal and breath sounds normal. No stridor. No respiratory distress. no wheezes or rales. exhibits no tenderness.   Musculoskeletal: Normal range of motion. 2+ edema in left lower extremity. 1+ edema in right lower extremity. marlyn pedal pulses 2+.  Neurological: Alert and oriented to person, place, and time. exhibits normal muscle tone. No cranial nerve deficits. Squints during cardinal fields of gaze exam.  Skin: Skin is warm and dry. No diaphoresis.   Psychiatric: Normal mood and affect. Behavior is normal.      Assessment and Plan:     The following treatment plan was discussed:    1. Vision changes      Next eye exam in December.   2. Meige syndrome (blepharospasm with oromandibular dystonia)  diazePAM (VALIUM) 5 MG Tab    refill valium so she can calll pharmacies and try to get old manufacturor - [employer]ylan.   3. Need for influenza vaccination  INFLUENZA VACCINE, HIGH DOSE " (65+ ONLY)    High dose flu shot given   4. Colon cancer screening  OCCULT BLOOD,FECAL,IMMUNOASSAY    Mail in FIT test provided         Followup: Return in about 2 weeks (around 11/13/2018).

## 2018-10-30 NOTE — PROGRESS NOTES
I have seen and examined the patient independently. All new labs and imaging studies were reviewed. Case was discussed in detail with the nurse practitioner student and plan of care formulated.  I agree with treatment plan.

## 2018-10-31 ENCOUNTER — HOSPITAL ENCOUNTER (OUTPATIENT)
Facility: MEDICAL CENTER | Age: 83
End: 2018-10-31
Attending: NURSE PRACTITIONER
Payer: MEDICARE

## 2018-10-31 PROCEDURE — 82274 ASSAY TEST FOR BLOOD FECAL: CPT

## 2018-11-05 LAB — HEMOCCULT STL QL IA: NEGATIVE

## 2018-11-20 ENCOUNTER — TELEPHONE (OUTPATIENT)
Dept: MEDICAL GROUP | Facility: MEDICAL CENTER | Age: 83
End: 2018-11-20

## 2018-11-20 NOTE — TELEPHONE ENCOUNTER
ESTABLISHED PATIENT PRE-VISIT PLANNING     Patient was NOT contacted to complete PVP.     Note: Patient will not be contacted if there is no indication to call.     1.  Reviewed notes from the last few office visits within the medical group: Yes    2.  If any orders were placed at last visit or intended to be done for this visit (i.e. 6 mos follow-up), do we have Results/Consult Notes?        •  Labs - Labs ordered, completed on 11/05/18 and results are in chart.   Note: If patient appointment is for lab review and patient did not complete labs, check with provider if OK to reschedule patient until labs completed.       •  Imaging - Imaging was not ordered at last office visit.       •  Referrals - No referrals were ordered at last office visit.    3. Is this appointment scheduled as a Hospital Follow-Up? No    4.  Immunizations were updated in [employer] using Web[employer]?: Yes       •  [employer] Recommendations: TDAP and ZOSTAVAX (Shingles)    5.  Patient is due for the following Health Maintenance Topics:   Health Maintenance Due   Topic Date Due   • IMM DTaP/Tdap/Td Vaccine (1 - Tdap) 02/02/1954   • IMM ZOSTER VACCINES (1 of 2) 02/02/1987       6.  MDX printed for Provider? NO    7.  Patient was NOT informed to arrive 15 min prior to their scheduled appointment and bring in their medication bottles.

## 2018-11-21 ENCOUNTER — OFFICE VISIT (OUTPATIENT)
Dept: MEDICAL GROUP | Facility: MEDICAL CENTER | Age: 83
End: 2018-11-21
Payer: MEDICARE

## 2018-11-21 VITALS
HEIGHT: 64 IN | OXYGEN SATURATION: 97 % | WEIGHT: 125 LBS | TEMPERATURE: 98 F | HEART RATE: 63 BPM | BODY MASS INDEX: 21.34 KG/M2 | DIASTOLIC BLOOD PRESSURE: 68 MMHG | SYSTOLIC BLOOD PRESSURE: 118 MMHG

## 2018-11-21 DIAGNOSIS — Z91.89 DRIVING SAFETY ISSUE: ICD-10-CM

## 2018-11-21 DIAGNOSIS — R25.1 TREMORS OF NERVOUS SYSTEM: ICD-10-CM

## 2018-11-21 DIAGNOSIS — Z23 NEED FOR SHINGLES VACCINE: ICD-10-CM

## 2018-11-21 DIAGNOSIS — H61.21 IMPACTED CERUMEN OF RIGHT EAR: ICD-10-CM

## 2018-11-21 DIAGNOSIS — H53.9 VISION CHANGES: ICD-10-CM

## 2018-11-21 PROCEDURE — 99214 OFFICE O/P EST MOD 30 MIN: CPT | Performed by: NURSE PRACTITIONER

## 2018-11-21 NOTE — PROGRESS NOTES
Subjective:     Ang Lancaster is a 83 y.o. female who presents with vision changes.    HPI:   Seen in f/u for vision changes.  She would like to restart driving.  She did a driving evaluation and passed with driving school.  She is still having vision changes.  She is going to see ophth soon  She feels that her rt ear feels plugged.  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  She has since found the old brand and it is working again for her facial tremors.          Patient Active Problem List    Diagnosis Date Noted   • Hyponatremia 04/23/2018   • Closed left hip fracture (HCC) 04/22/2018   • Risk for falls 10/11/2017   • Osteoporosis    • Recurrent UTI    • Meige syndrome (blepharospasm with oromandibular dystonia)    • Degenerative joint disease    • Impaired fasting glucose    • Vertigo 03/20/2012   • Preventative health care 11/10/2009   • [place] (hard of hearing)        Current medicines (including changes today)  Current Outpatient Prescriptions   Medication Sig Dispense Refill   • Zoster Vac Recomb Adjuvanted (SHINGRIX) 50 MCG Recon Susp 0.5 mL by Intramuscular route Once for 1 dose. 0.5 mL 0   • diazePAM (VALIUM) 5 MG Tab Take 1 Tab by mouth every day for 30 days. 30 Tab 3   • famotidine (PEPCID) 20 MG TABS Take 20 mg by mouth PRN.     • MULTIVITAL PO Take 1 Tab by mouth every day.     • CALTRATE 600+D PO Take 1 Tab by mouth every day.       No current facility-administered medications for this visit.        Allergies   Allergen Reactions   • Nkda [No Known Drug Allergy]    • Other Environmental Runny Nose and Itching     pollens       ROS  Constitutional: Negative. Negative for fever, chills, weight loss, malaise/fatigue and diaphoresis.   HENT: Negative. Negative for ear pain, nosebleeds, congestion, sore throat, neck pain, tinnitus and ear discharge.   Respiratory: Negative. Negative for cough, hemoptysis, sputum production,  "shortness of breath, wheezing and stridor.   Cardiovascular: Negative. Negative for chest pain, palpitations, orthopnea, claudication, leg swelling and PND.   Gastrointestinal: Denies nausea, vomiting, diarrhea, constipation, heartburn, melena or hematochezia.  Genitourinary: Denies dysuria, hematuria, urinary incontinence, frequency or urgency.        Objective:     Blood pressure 118/68, pulse 63, temperature 36.7 °C (98 °F), temperature source Temporal, height 1.626 m (5' 4\"), weight 56.7 kg (125 lb), SpO2 97 %, not currently breastfeeding. Body mass index is 21.46 kg/m².    Physical Exam:  Physical Exam   Vitals reviewed.  Constitutional: oriented to person, place, and time. appears well-developed and well-nourished. No distress.   HENT:  Head: Normocephalic and atraumatic. Right Ear: External ear normal. Left Ear: External ear normal. Nose: Nose normal. Mouth/Throat: Oropharynx is clear and moist. No oropharyngeal exudate.  left tm wnl.  Rt tm occluded with cerumen.  Irrigation with warm water.  Eyes: Right eye exhibits no discharge. Left eye exhibits no discharge. No scleral icterus.  Neck: No JVD present.  Cardiovascular: Normal rate, regular rhythm, normal heart sounds and intact distal pulses.  Exam reveals no gallop and no friction rub.  No murmur heard.  No carotid bruits.   Pulmonary/Chest: Effort normal and breath sounds normal. No stridor. No respiratory distress. no wheezes or rales. exhibits no tenderness.   Musculoskeletal: Normal range of motion. exhibits no edema. marlyn pedal pulses 2+.  Lymphadenopathy: no cervical or supraclavicular adenopathy.   Neurological: alert and oriented to person, place, and time. exhibits normal muscle tone. Coordination normal.   Skin: Skin is warm and dry. no diaphoresis.   Psychiatric: normal mood and affect. behavior is normal.       Assessment and Plan:     The following treatment plan was discussed:    1. Vision changes      f/u with ophth for vision changes.  no " driving until cleared by PT and ophth   2. Impacted cerumen of right ear  Ear Irrigation (MA Only)    unsuccessful at removal.  use debrox and f/u n ext week if not cleared.  will do irrigation with nurse visit only.   3. Need for shingles vaccine  Zoster Vac Recomb Adjuvanted (SHINGRIX) 50 MCG Recon Susp    DISCONTINUED: Zoster Vac Recomb Adjuvanted (SHINGRIX) 50 MCG Recon Susp   4. Driving safety issue  REFERRAL TO PHYSICAL THERAPY Reason for Therapy: Eval/Treat/Report    she has been cleared by driving school.  will also do PT driving eval.    5. Tremors of nervous system      now controlled since she found the correct brand of valium that works for her.   5.  Unable to remove cerumen from rt ear.  Will use debrox for next week.  F/u monday for reirrigation with nurse visit       Followup: Return in about 7 months (around 6/21/2019).

## 2019-02-06 NOTE — PROGRESS NOTES
Subjective:      PT is a 83 y.o. female who presents with Laceration (forehead)            HPI  Pt notes about 1 hour ago, she sustained a mechanical ground level fall in front of [employer] causing forehead laceration and left thumb pain. Pt denies other injuries or LOC, neck or back pain. Pt has not taken any Rx medications for this condition. Pt states the pain is a 7/10, aching in nature and worse right now. Pt denies CP, SOB, NVD, paresthesias, headaches, dizziness, change in vision, hives, or other joint pain. The pt's medication list, problem list, and allergies have been evaluated and reviewed during today's visit.    PMH:  Past Medical History:   Diagnosis Date   • Degenerative joint disease     lbp and neck pain   • [place] (hard of hearing)     rt ear with hearing aide   • IFG (impaired fasting glucose)    • Impaired fasting glucose    • Meige syndrome (blepharospasm with oromandibular dystonia)    • Osteoporosis    • Recurrent UTI    • Risk for falls 10/11/2017   • Vertigo        PSH:  Past Surgical History:   Procedure Laterality Date   • HIP CANNULATED SCREW Left 2018    Procedure: HIP CANNULATED SCREW;  Surgeon: Hu Galaviz M.D.;  Location: SURGERY [facility];  Service: Orthopedics   • TUBAL COAGULATION LAPAROSCOPIC BILATERAL  1973       Fam Hx:    family history includes Alcohol/Drug in her father; Diabetes in her father; Heart Disease in her paternal grandmother; Stroke in her mother.  Family Status   Relation Status   • Mother    • Father    • Sister Alive   • Brother Alive   • Paternal Grandmother    • Brother        Soc HX:  Social History     Social History   • Marital status:      Spouse name: N/A   • Number of children: N/A   • Years of education: N/A     Occupational History   • Not on file.     Social History Main Topics   • Smoking status: Former Smoker     Packs/day: 1.50     Years: 45.00     Types: Cigarettes     Quit date: 2004   •  "Smokeless tobacco: Never Used   • Alcohol use 3.5 - 17.5 oz/week     7 - 35 Glasses of wine per week   • Drug use: Yes      Comment: CBD oil   • Sexual activity: Not on file     Other Topics Concern   • Not on file     Social History Narrative   • No narrative on file         Medications:    Current Outpatient Prescriptions:   •  sulfamethoxazole-trimethoprim (BACTRIM DS) 800-160 MG tablet, Take 1 Tab by mouth 2 times a day for 5 days., Disp: 10 Tab, Rfl: 0  •  alendronate (FOSAMAX) 70 MG Tab, Take 1 Tab by mouth every 7 days., Disp: 4 Tab, Rfl: 2  •  diazePAM (VALIUM) 5 MG Tab, Take 2.5 mg by mouth every day., Disp: , Rfl:   •  famotidine (PEPCID) 20 MG TABS, Take 20 mg by mouth PRN., Disp: , Rfl:   •  MULTIVITAL PO, Take 1 Tab by mouth every day., Disp: , Rfl:   •  CALTRATE 600+D PO, Take 1 Tab by mouth every day., Disp: , Rfl:       Allergies:  Nkda [no known drug allergy] and Other environmental    ROS  Constitutional: Negative for fever, chills and malaise/fatigue.   HENT: Negative for congestion and sore throat.  +left forehead laceration  Eyes: Negative for blurred vision, double vision and photophobia.   Respiratory: Negative for cough and shortness of breath.    Cardiovascular: Negative for chest pain and palpitations.   Gastrointestinal: Negative for heartburn, nausea, vomiting, abdominal pain, diarrhea and constipation.   Genitourinary: Negative for dysuria and flank pain.   Musculoskeletal: POS for left thumb joint pain and myalgias.   Skin: Negative for itching and rash.   Neurological: Negative for dizziness, tingling and headaches.   Endo/Heme/Allergies: Does not bruise/bleed easily.   Psychiatric/Behavioral: Negative for depression. The patient is not nervous/anxious.           Objective:     /78   Pulse 85   Temp 36.1 °C (97 °F)   Resp 14   Ht 1.626 m (5' 4\")   Wt 57.6 kg (127 lb)   SpO2 96%   BMI 21.80 kg/m²      Physical Exam   HENT:   Head: Head is with laceration. "       Musculoskeletal:        Arms:       Left hand: She exhibits decreased range of motion, tenderness and swelling. She exhibits no bony tenderness, normal two-point discrimination, normal capillary refill, no deformity and no laceration. Normal sensation noted. Decreased strength noted. She exhibits finger abduction and thumb/finger opposition.        Hands:         Constitutional: PT is oriented to person, place, and time. PT appears well-developed and well-nourished. No distress.   HENT:   Head: Normocephalic and atraumatic.   Mouth/Throat: Oropharynx is clear and moist. No oropharyngeal exudate.   Eyes: Conjunctivae normal and EOM are normal. Pupils are equal, round, and reactive to light.   Neck: Normal range of motion. Neck supple. No thyromegaly present.   Cardiovascular: Normal rate, regular rhythm, normal heart sounds and intact distal pulses.  Exam reveals no gallop and no friction rub.    No murmur heard.  Pulmonary/Chest: Effort normal and breath sounds normal. No respiratory distress. PT has no wheezes. PT has no rales. Pt exhibits no tenderness.   Abdominal: Soft. Bowel sounds are normal. PT exhibits no distension and no mass. There is no tenderness. There is no rebound and no guarding.   Neurological: PT is alert and oriented to person, place, and time. PT has normal reflexes. No cranial nerve deficit.   Skin: Skin is warm and dry. No rash noted. PT is not diaphoretic. No erythema.       Psychiatric: PT has a normal mood and affect. PT behavior is normal. Judgment and thought content normal.      RADS:  Narrative       6/20/2018 12:29 PM    HISTORY/REASON FOR EXAM:  Pain/Deformity Following Trauma  Left hand trauma at first metacarpal post fall today    TECHNIQUE/EXAM DESCRIPTION AND NUMBER OF VIEWS:  3 views of the LEFT hand.    COMPARISON: None    FINDINGS:    Diffuse osseous demineralization, limiting evaluation for nondisplaced fracture.    No acute fracture or dislocation.    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   Impression         No definite fracture identified but evaluation limited due to osseous demineralization.   Reading Provider Reading Date   Felice Deal M.D. Jun 20, 2018   Signing Provider Signing Date Signing Time   Felice Deal M.D.           Assessment/Plan:     1. Forehead laceration, initial encounter    - sulfamethoxazole-trimethoprim (BACTRIM DS) 800-160 MG tablet; Take 1 Tab by mouth 2 times a day for 5 days.  Dispense: 10 Tab; Refill: 0    2. Sprain of metacarpophalangeal (MCP) joint of left thumb, initial encounter    - DX-HAND 3+ LEFT; Future    3. Fall with injury, initial encounter    - DX-HAND 3+ LEFT; Future    Procedure: Laceration Repair  -Risks including bleeding, nerve damage, infection, and poor cosmetic outcome discussed at length. Benefits and alternatives discussed.   -Sterile technique throughout  -Local anesthesia with 2% lidocaine plain  -Closed with #3  5-0 Nylon interrupted sutures with good wound approximation  -Polysporin and dressing placed  -Patient tolerated well    Thumb spica wrist splint to left hand/wrist  Wound and dressing care discussed  RICE therapy discussed  Gentle ROM exercises discussed  WBAT left wrist/hand  Ice/heat therapy discussed  NSAIDs for pain control  Rest, fluids encouraged.  AVS with medical info given.  Pt was in full understanding and agreement with the plan.  Follow-up 5 days for suture removal and wound check     Abdomen soft, nontender, nondistended, bowel sounds present in all 4 quadrants.

## 2019-03-28 ENCOUNTER — OFFICE VISIT (OUTPATIENT)
Dept: URGENT CARE | Facility: CLINIC | Age: 84
End: 2019-03-28
Payer: MEDICARE

## 2019-03-28 VITALS
DIASTOLIC BLOOD PRESSURE: 60 MMHG | HEIGHT: 64 IN | SYSTOLIC BLOOD PRESSURE: 110 MMHG | WEIGHT: 125 LBS | HEART RATE: 66 BPM | BODY MASS INDEX: 21.34 KG/M2 | OXYGEN SATURATION: 99 % | TEMPERATURE: 98.4 F | RESPIRATION RATE: 12 BRPM

## 2019-03-28 DIAGNOSIS — S05.12XA CONTUSION OF LEFT EYE, INITIAL ENCOUNTER: ICD-10-CM

## 2019-03-28 DIAGNOSIS — S00.212A ABRASION OF LEFT EYEBROW, INITIAL ENCOUNTER: ICD-10-CM

## 2019-03-28 PROCEDURE — 99213 OFFICE O/P EST LOW 20 MIN: CPT | Performed by: NURSE PRACTITIONER

## 2019-03-28 ASSESSMENT — ENCOUNTER SYMPTOMS
SPEECH CHANGE: 0
FALLS: 1
FEVER: 0
SHORTNESS OF BREATH: 0
PHOTOPHOBIA: 0
VOMITING: 0
BACK PAIN: 0
CONSTIPATION: 0
EYE REDNESS: 0
EYE PAIN: 1
DIZZINESS: 0
PSYCHIATRIC NEGATIVE: 1
DIARRHEA: 0
COUGH: 0
CHILLS: 0
NECK PAIN: 0
BLURRED VISION: 0
NAUSEA: 0
WHEEZING: 0
DOUBLE VISION: 0
ABDOMINAL PAIN: 0
SINUS PAIN: 0
SORE THROAT: 0
HEADACHES: 0
EYE DISCHARGE: 0
SENSORY CHANGE: 0
WEAKNESS: 0

## 2019-03-28 NOTE — PROGRESS NOTES
Subjective:   Ang Lancaster is a 84 y.o. female who presents for Eye Injury (X 4 days left eye injury. sees fine.)        HPI   Patient with new onset bruising to the left eye that occurred 4 days ago. States she was bringing trash cans inside and wind accidentally blew trash can over and patient fell. Denies any current left eye pain, but has bruising. Denies changes to vision or drainage from eye. Denies alleviating or aggravating factors.  Denies any other area of pain or injury.    Review of Systems   Constitutional: Negative for chills and fever.   HENT: Negative for congestion, ear discharge, ear pain, sinus pain and sore throat.    Eyes: Positive for pain. Negative for blurred vision, double vision, photophobia, discharge and redness.   Respiratory: Negative for cough, shortness of breath and wheezing.    Cardiovascular: Negative for chest pain.   Gastrointestinal: Negative for abdominal pain, constipation, diarrhea, nausea and vomiting.   Genitourinary: Negative.    Musculoskeletal: Positive for falls. Negative for back pain and neck pain.   Skin: Negative.    Neurological: Negative for dizziness, sensory change, speech change, weakness and headaches.   Psychiatric/Behavioral: Negative.      PMH:  has a past medical history of Degenerative joint disease; [place] (hard of hearing); IFG (impaired fasting glucose); Impaired fasting glucose; Meige syndrome (blepharospasm with oromandibular dystonia); Osteoporosis; Recurrent UTI; Risk for falls (10/11/2017); and Vertigo. She also has no past medical history of Diabetes.  MEDS:   Current Outpatient Prescriptions:   •  famotidine (PEPCID) 20 MG TABS, Take 20 mg by mouth PRN., Disp: , Rfl:   •  MULTIVITAL PO, Take 1 Tab by mouth every day., Disp: , Rfl:   •  CALTRATE 600+D PO, Take 1 Tab by mouth every day., Disp: , Rfl:   ALLERGIES:   Allergies   Allergen Reactions   • Nkda [No Known Drug Allergy]    • Other Environmental Runny Nose and Itching     pollens     SURGHX:  "  Past Surgical History:   Procedure Laterality Date   • HIP CANNULATED SCREW Left 4/22/2018    Procedure: HIP CANNULATED SCREW;  Surgeon: Hu Galaviz M.D.;  Location: SURGERY [facility];  Service: Orthopedics   • TUBAL COAGULATION LAPAROSCOPIC BILATERAL  1973     SOCHX:  reports that she quit smoking about 15 years ago. Her smoking use included Cigarettes. She has a 67.50 pack-year smoking history. She has never used smokeless tobacco. She reports that she drinks about 3.5 - 17.5 oz of alcohol per week . She reports that she uses drugs.  FH: Family history was reviewed, no pertinent findings to report     Objective:   /60 (BP Location: Left arm, Patient Position: Sitting, BP Cuff Size: Adult)   Pulse 66   Temp 36.9 °C (98.4 °F) (Temporal)   Resp 12   Ht 1.626 m (5' 4\")   Wt 56.7 kg (125 lb)   SpO2 99%   BMI 21.46 kg/m²   Physical Exam   Constitutional: She is oriented to person, place, and time. She appears well-developed and well-nourished.   HENT:   Head: Normocephalic.   Right Ear: Hearing and tympanic membrane normal. Tympanic membrane is not erythematous. No middle ear effusion.   Left Ear: Hearing and tympanic membrane normal. Tympanic membrane is not erythematous.  No middle ear effusion.   Nose: No rhinorrhea. Right sinus exhibits no maxillary sinus tenderness and no frontal sinus tenderness. Left sinus exhibits no maxillary sinus tenderness and no frontal sinus tenderness.   Mouth/Throat: Oropharynx is clear and moist and mucous membranes are normal.   Eyes: Pupils are equal, round, and reactive to light. Conjunctivae, EOM and lids are normal.   Slit lamp exam:       The left eye shows no corneal flare.   Examined left eye with fluorescein and no abrasion.   Neck: Trachea normal and normal range of motion. Carotid bruit is not present. No Brudzinski's sign and no Kernig's sign noted. No thyromegaly present.   Cardiovascular: Normal rate, regular rhythm and normal heart sounds.  "   Pulmonary/Chest: Effort normal and breath sounds normal. No respiratory distress.   Abdominal: Soft. Bowel sounds are normal. She exhibits no distension. There is no hepatosplenomegaly. There is no tenderness. There is no guarding.   Musculoskeletal: Normal range of motion.   Neurological: She is alert and oriented to person, place, and time. She has normal strength and normal reflexes. No cranial nerve deficit or sensory deficit. She displays a negative Romberg sign.   No neuro red flags   Skin: Skin is warm and dry. Capillary refill takes less than 2 seconds.        Periorbital bruising around left eye without swelling. No tenderness around the left eye. Small abrasion over left eye - healing well and no active bleeding or discharge.   Psychiatric: She has a normal mood and affect. Her speech is normal and behavior is normal. Judgment and thought content normal. Cognition and memory are normal.   Vitals reviewed.        Assessment/Plan:   Assessment    1. Contusion of left eye, initial encounter    2. Abrasion of left eyebrow, initial encounter    No neuro red flags, discussed with patient symptoms.   Advised to ice eye PRN  Apply Polysporin to area of abrasion once daily.  If changes to vision occur or eye pain develop, must see Eye Doctor.  Discussed signs and symptoms of when to go to ER; strict ER precautions discussed    Differential diagnosis, natural history, supportive care, and indications for immediate follow-up discussed.

## 2019-04-25 ENCOUNTER — OFFICE VISIT (OUTPATIENT)
Dept: MEDICAL GROUP | Facility: MEDICAL CENTER | Age: 84
End: 2019-04-25
Payer: MEDICARE

## 2019-04-25 VITALS
OXYGEN SATURATION: 96 % | HEART RATE: 87 BPM | TEMPERATURE: 97.4 F | BODY MASS INDEX: 21.85 KG/M2 | DIASTOLIC BLOOD PRESSURE: 60 MMHG | WEIGHT: 128 LBS | SYSTOLIC BLOOD PRESSURE: 100 MMHG | HEIGHT: 64 IN

## 2019-04-25 DIAGNOSIS — R73.01 IFG (IMPAIRED FASTING GLUCOSE): ICD-10-CM

## 2019-04-25 DIAGNOSIS — M81.0 POST-MENOPAUSAL OSTEOPOROSIS: ICD-10-CM

## 2019-04-25 DIAGNOSIS — G24.4 MEIGE SYNDROME (BLEPHAROSPASM WITH OROMANDIBULAR DYSTONIA): ICD-10-CM

## 2019-04-25 DIAGNOSIS — N95.9 POST MENOPAUSAL PROBLEMS: ICD-10-CM

## 2019-04-25 PROCEDURE — 99214 OFFICE O/P EST MOD 30 MIN: CPT | Performed by: NURSE PRACTITIONER

## 2019-04-25 RX ORDER — DIAZEPAM 5 MG/1
5 TABLET ORAL DAILY
Qty: 90 TAB | Refills: 1 | Status: SHIPPED | OUTPATIENT
Start: 2019-04-25 | End: 2019-05-12

## 2019-04-25 RX ORDER — IBANDRONATE SODIUM 150 MG/1
150 TABLET, FILM COATED ORAL
Qty: 3 TAB | Refills: 1 | Status: SHIPPED | OUTPATIENT
Start: 2019-04-25 | End: 2019-05-12

## 2019-04-25 ASSESSMENT — PATIENT HEALTH QUESTIONNAIRE - PHQ9: CLINICAL INTERPRETATION OF PHQ2 SCORE: 0

## 2019-04-25 NOTE — PROGRESS NOTES
Subjective:     Ang Lancaster is a 84 y.o. female who presents with tremors.    HPI:   Seen in f/u for tremors.  She is feeling ok.    She needs refill on valium.  Uses regularly for her tremors.  She is only using 1/2 tab daily.    Her last dexa scan in 2016 showed osteoporosis.  She tried fosamax but it caused severe leg swelling.  She stopped long ago  She is due lab in june.  She will need CMP, LP.  Also needs a1c and alb/cr ratio d/t IFGg      Patient Active Problem List    Diagnosis Date Noted   • Hyponatremia 04/23/2018   • Closed left hip fracture (HCC) 04/22/2018   • Risk for falls 10/11/2017   • Osteoporosis    • Recurrent UTI    • Meige syndrome (blepharospasm with oromandibular dystonia)    • Degenerative joint disease    • Impaired fasting glucose    • Vertigo 03/20/2012   • Preventative health care 11/10/2009   • [place] (hard of hearing)        Current medicines (including changes today)  Current Outpatient Prescriptions   Medication Sig Dispense Refill   • ibandronate (BONIVA) 150 MG tablet Take 1 Tab by mouth every 30 days. 3 Tab 1   • diazePAM (VALIUM) 5 MG Tab Take 1 Tab by mouth every day for 90 days. 90 Tab 1   • famotidine (PEPCID) 20 MG TABS Take 20 mg by mouth PRN.     • MULTIVITAL PO Take 1 Tab by mouth every day.     • CALTRATE 600+D PO Take 1 Tab by mouth every day.       No current facility-administered medications for this visit.        Allergies   Allergen Reactions   • Nkda [No Known Drug Allergy]    • Other Environmental Runny Nose and Itching     pollens       ROS  Constitutional: Negative. Negative for fever, chills, weight loss, malaise/fatigue and diaphoresis.   HENT: Negative. Negative for hearing loss, ear pain, nosebleeds, congestion, sore throat, neck pain, tinnitus and ear discharge.   Respiratory: Negative. Negative for cough, hemoptysis, sputum production, shortness of breath, wheezing and stridor.   Cardiovascular: Negative. Negative for chest pain, palpitations, orthopnea,  "claudication, leg swelling and PND.   Gastrointestinal: Denies nausea, vomiting, diarrhea, constipation, heartburn, melena or hematochezia.  Genitourinary: Denies dysuria, hematuria, urinary incontinence, frequency or urgency.        Objective:     /60 (BP Location: Right arm, Patient Position: Sitting)   Pulse 87   Temp 36.3 °C (97.4 °F) (Temporal)   Ht 1.626 m (5' 4\")   Wt 58.1 kg (128 lb)   SpO2 96%  Body mass index is 21.97 kg/m².    Physical Exam:  Vitals reviewed.  Constitutional: Oriented to person, place, and time. appears well-developed and well-nourished. No distress.   Cardiovascular: Normal rate, regular rhythm, normal heart sounds and intact distal pulses. Exam reveals no gallop and no friction rub. No murmur heard. No carotid bruits.   Pulmonary/Chest: Effort normal and breath sounds normal. No stridor. No respiratory distress. no wheezes or rales. exhibits no tenderness.   Musculoskeletal: Normal range of motion. exhibits no edema. marlyn pedal pulses 2+.  Lymphadenopathy: No cervical or supraclavicular adenopathy.   Neurological: Alert and oriented to person, place, and time. exhibits normal muscle tone.  Skin: Skin is warm and dry. No diaphoresis.   Psychiatric: Normal mood and affect. Behavior is normal.      Assessment and Plan:     The following treatment plan was discussed:    1. Meige syndrome (blepharospasm with oromandibular dystonia)  diazePAM (VALIUM) 5 MG Tab    refill valium that she uses for tremors.  uses 1/2 tab daily   2. Post-menopausal osteoporosis  ibandronate (BONIVA) 150 MG tablet    DS-BONE DENSITY STUDY (DEXA)    failed fosamax d/t pedal edema.  try boniva.  recheck dexa scan.     3. Post menopausal problems  DS-BONE DENSITY STUDY (DEXA)   4. IFG (impaired fasting glucose)  Comp Metabolic Panel    Lipid Profile    HEMOGLOBIN A1C    MICROALBUMIN CREAT RATIO URINE    do lab with CMP, LP, A1C, alb/cr ratio.  then f/u for review lab         Followup: Return in about 3 " months (around 7/25/2019).

## 2019-05-09 ENCOUNTER — HOSPITAL ENCOUNTER (OUTPATIENT)
Dept: RADIOLOGY | Facility: MEDICAL CENTER | Age: 84
End: 2019-05-09
Attending: NURSE PRACTITIONER
Payer: MEDICARE

## 2019-05-09 DIAGNOSIS — M81.0 POST-MENOPAUSAL OSTEOPOROSIS: ICD-10-CM

## 2019-05-09 DIAGNOSIS — N95.9 POST MENOPAUSAL PROBLEMS: ICD-10-CM

## 2019-05-09 PROCEDURE — 77080 DXA BONE DENSITY AXIAL: CPT

## 2019-05-12 ENCOUNTER — APPOINTMENT (OUTPATIENT)
Dept: RADIOLOGY | Facility: MEDICAL CENTER | Age: 84
DRG: 481 | End: 2019-05-12
Attending: EMERGENCY MEDICINE
Payer: MEDICARE

## 2019-05-12 ENCOUNTER — HOSPITAL ENCOUNTER (INPATIENT)
Facility: MEDICAL CENTER | Age: 84
LOS: 4 days | DRG: 481 | End: 2019-05-16
Attending: EMERGENCY MEDICINE | Admitting: HOSPITALIST
Payer: MEDICARE

## 2019-05-12 DIAGNOSIS — H91.8X2 OTHER SPECIFIED HEARING LOSS OF LEFT EAR, UNSPECIFIED HEARING STATUS ON CONTRALATERAL SIDE: ICD-10-CM

## 2019-05-12 DIAGNOSIS — S72.001A CLOSED FRACTURE OF RIGHT HIP, INITIAL ENCOUNTER (HCC): ICD-10-CM

## 2019-05-12 PROBLEM — K21.9 GERD (GASTROESOPHAGEAL REFLUX DISEASE): Status: ACTIVE | Noted: 2019-05-12

## 2019-05-12 LAB
ALBUMIN SERPL BCP-MCNC: 3.6 G/DL (ref 3.2–4.9)
ALBUMIN/GLOB SERPL: 1.3 G/DL
ALP SERPL-CCNC: 44 U/L (ref 30–99)
ALT SERPL-CCNC: 19 U/L (ref 2–50)
ANION GAP SERPL CALC-SCNC: 7 MMOL/L (ref 0–11.9)
APTT PPP: 29.4 SEC (ref 24.7–36)
AST SERPL-CCNC: 22 U/L (ref 12–45)
BASOPHILS # BLD AUTO: 0.7 % (ref 0–1.8)
BASOPHILS # BLD: 0.08 K/UL (ref 0–0.12)
BILIRUB SERPL-MCNC: 0.9 MG/DL (ref 0.1–1.5)
BUN SERPL-MCNC: 20 MG/DL (ref 8–22)
CALCIUM SERPL-MCNC: 8.9 MG/DL (ref 8.4–10.2)
CHLORIDE SERPL-SCNC: 103 MMOL/L (ref 96–112)
CO2 SERPL-SCNC: 25 MMOL/L (ref 20–33)
CREAT SERPL-MCNC: 0.87 MG/DL (ref 0.5–1.4)
EOSINOPHIL # BLD AUTO: 0.13 K/UL (ref 0–0.51)
EOSINOPHIL NFR BLD: 1.1 % (ref 0–6.9)
ERYTHROCYTE [DISTWIDTH] IN BLOOD BY AUTOMATED COUNT: 45.1 FL (ref 35.9–50)
GLOBULIN SER CALC-MCNC: 2.7 G/DL (ref 1.9–3.5)
GLUCOSE SERPL-MCNC: 101 MG/DL (ref 65–99)
HCT VFR BLD AUTO: 38.8 % (ref 37–47)
HGB BLD-MCNC: 12.9 G/DL (ref 12–16)
IMM GRANULOCYTES # BLD AUTO: 0.04 K/UL (ref 0–0.11)
IMM GRANULOCYTES NFR BLD AUTO: 0.3 % (ref 0–0.9)
INR PPP: 0.98 (ref 0.87–1.13)
LYMPHOCYTES # BLD AUTO: 1.48 K/UL (ref 1–4.8)
LYMPHOCYTES NFR BLD: 12.6 % (ref 22–41)
MCH RBC QN AUTO: 31.7 PG (ref 27–33)
MCHC RBC AUTO-ENTMCNC: 33.2 G/DL (ref 33.6–35)
MCV RBC AUTO: 95.3 FL (ref 81.4–97.8)
MONOCYTES # BLD AUTO: 0.63 K/UL (ref 0–0.85)
MONOCYTES NFR BLD AUTO: 5.4 % (ref 0–13.4)
NEUTROPHILS # BLD AUTO: 9.38 K/UL (ref 2–7.15)
NEUTROPHILS NFR BLD: 79.9 % (ref 44–72)
NRBC # BLD AUTO: 0 K/UL
NRBC BLD-RTO: 0 /100 WBC
PLATELET # BLD AUTO: 183 K/UL (ref 164–446)
PMV BLD AUTO: 10.4 FL (ref 9–12.9)
POTASSIUM SERPL-SCNC: 4 MMOL/L (ref 3.6–5.5)
PROT SERPL-MCNC: 6.3 G/DL (ref 6–8.2)
PROTHROMBIN TIME: 12.9 SEC (ref 12–14.6)
RBC # BLD AUTO: 4.07 M/UL (ref 4.2–5.4)
SODIUM SERPL-SCNC: 135 MMOL/L (ref 135–145)
WBC # BLD AUTO: 11.7 K/UL (ref 4.8–10.8)

## 2019-05-12 PROCEDURE — 93005 ELECTROCARDIOGRAM TRACING: CPT | Performed by: EMERGENCY MEDICINE

## 2019-05-12 PROCEDURE — 700105 HCHG RX REV CODE 258: Performed by: HOSPITALIST

## 2019-05-12 PROCEDURE — 80053 COMPREHEN METABOLIC PANEL: CPT

## 2019-05-12 PROCEDURE — 700111 HCHG RX REV CODE 636 W/ 250 OVERRIDE (IP): Performed by: HOSPITALIST

## 2019-05-12 PROCEDURE — 99222 1ST HOSP IP/OBS MODERATE 55: CPT | Mod: 25,AI | Performed by: HOSPITALIST

## 2019-05-12 PROCEDURE — 85025 COMPLETE CBC W/AUTO DIFF WBC: CPT

## 2019-05-12 PROCEDURE — 700102 HCHG RX REV CODE 250 W/ 637 OVERRIDE(OP): Performed by: HOSPITALIST

## 2019-05-12 PROCEDURE — 96375 TX/PRO/DX INJ NEW DRUG ADDON: CPT

## 2019-05-12 PROCEDURE — 99497 ADVNCD CARE PLAN 30 MIN: CPT | Performed by: HOSPITALIST

## 2019-05-12 PROCEDURE — 99285 EMERGENCY DEPT VISIT HI MDM: CPT

## 2019-05-12 PROCEDURE — 73501 X-RAY EXAM HIP UNI 1 VIEW: CPT | Mod: RT

## 2019-05-12 PROCEDURE — 770006 HCHG ROOM/CARE - MED/SURG/GYN SEMI*

## 2019-05-12 PROCEDURE — 85610 PROTHROMBIN TIME: CPT

## 2019-05-12 PROCEDURE — 72170 X-RAY EXAM OF PELVIS: CPT

## 2019-05-12 PROCEDURE — 71045 X-RAY EXAM CHEST 1 VIEW: CPT

## 2019-05-12 PROCEDURE — 700111 HCHG RX REV CODE 636 W/ 250 OVERRIDE (IP): Performed by: EMERGENCY MEDICINE

## 2019-05-12 PROCEDURE — A9270 NON-COVERED ITEM OR SERVICE: HCPCS | Performed by: HOSPITALIST

## 2019-05-12 PROCEDURE — 85730 THROMBOPLASTIN TIME PARTIAL: CPT

## 2019-05-12 PROCEDURE — 96374 THER/PROPH/DIAG INJ IV PUSH: CPT

## 2019-05-12 RX ORDER — ONDANSETRON 2 MG/ML
4 INJECTION INTRAMUSCULAR; INTRAVENOUS EVERY 4 HOURS PRN
Status: DISCONTINUED | OUTPATIENT
Start: 2019-05-12 | End: 2019-05-13

## 2019-05-12 RX ORDER — ENALAPRILAT 1.25 MG/ML
1.25 INJECTION INTRAVENOUS EVERY 6 HOURS PRN
Status: DISCONTINUED | OUTPATIENT
Start: 2019-05-12 | End: 2019-05-16 | Stop reason: HOSPADM

## 2019-05-12 RX ORDER — ACETAMINOPHEN 325 MG/1
650 TABLET ORAL EVERY 6 HOURS PRN
Status: DISCONTINUED | OUTPATIENT
Start: 2019-05-12 | End: 2019-05-16 | Stop reason: HOSPADM

## 2019-05-12 RX ORDER — OXYCODONE HYDROCHLORIDE 5 MG/1
2.5 TABLET ORAL
Status: DISCONTINUED | OUTPATIENT
Start: 2019-05-12 | End: 2019-05-16 | Stop reason: HOSPADM

## 2019-05-12 RX ORDER — OXYCODONE HYDROCHLORIDE 5 MG/1
5 TABLET ORAL
Status: DISCONTINUED | OUTPATIENT
Start: 2019-05-12 | End: 2019-05-16 | Stop reason: HOSPADM

## 2019-05-12 RX ORDER — DIAZEPAM 5 MG/1
5 TABLET ORAL DAILY
Status: DISCONTINUED | OUTPATIENT
Start: 2019-05-12 | End: 2019-05-13

## 2019-05-12 RX ORDER — ONDANSETRON 4 MG/1
4 TABLET, ORALLY DISINTEGRATING ORAL EVERY 4 HOURS PRN
Status: DISCONTINUED | OUTPATIENT
Start: 2019-05-12 | End: 2019-05-16 | Stop reason: HOSPADM

## 2019-05-12 RX ORDER — BISACODYL 10 MG
10 SUPPOSITORY, RECTAL RECTAL
Status: DISCONTINUED | OUTPATIENT
Start: 2019-05-12 | End: 2019-05-13

## 2019-05-12 RX ORDER — FAMOTIDINE 20 MG/1
20 TABLET, FILM COATED ORAL DAILY
Status: DISCONTINUED | OUTPATIENT
Start: 2019-05-12 | End: 2019-05-16 | Stop reason: HOSPADM

## 2019-05-12 RX ORDER — AMOXICILLIN 250 MG
2 CAPSULE ORAL 2 TIMES DAILY
Status: DISCONTINUED | OUTPATIENT
Start: 2019-05-12 | End: 2019-05-13

## 2019-05-12 RX ORDER — SODIUM CHLORIDE, SODIUM LACTATE, POTASSIUM CHLORIDE, CALCIUM CHLORIDE 600; 310; 30; 20 MG/100ML; MG/100ML; MG/100ML; MG/100ML
INJECTION, SOLUTION INTRAVENOUS CONTINUOUS
Status: DISCONTINUED | OUTPATIENT
Start: 2019-05-12 | End: 2019-05-16 | Stop reason: HOSPADM

## 2019-05-12 RX ORDER — MORPHINE SULFATE 4 MG/ML
2 INJECTION, SOLUTION INTRAMUSCULAR; INTRAVENOUS
Status: DISCONTINUED | OUTPATIENT
Start: 2019-05-12 | End: 2019-05-16 | Stop reason: HOSPADM

## 2019-05-12 RX ORDER — DIAZEPAM 5 MG/1
1.25-2.5 TABLET ORAL 2 TIMES DAILY
COMMUNITY
End: 2019-06-11

## 2019-05-12 RX ORDER — ONDANSETRON 2 MG/ML
4 INJECTION INTRAMUSCULAR; INTRAVENOUS ONCE
Status: COMPLETED | OUTPATIENT
Start: 2019-05-12 | End: 2019-05-12

## 2019-05-12 RX ORDER — NAPROXEN SODIUM 220 MG
220 TABLET ORAL PRN
Status: ON HOLD | COMMUNITY
End: 2019-05-16

## 2019-05-12 RX ORDER — POLYETHYLENE GLYCOL 3350 17 G/17G
1 POWDER, FOR SOLUTION ORAL
Status: DISCONTINUED | OUTPATIENT
Start: 2019-05-12 | End: 2019-05-13

## 2019-05-12 RX ADMIN — MORPHINE SULFATE 2 MG: 4 INJECTION INTRAVENOUS at 16:01

## 2019-05-12 RX ADMIN — ONDANSETRON 4 MG: 2 INJECTION INTRAMUSCULAR; INTRAVENOUS at 14:36

## 2019-05-12 RX ADMIN — SODIUM CHLORIDE, POTASSIUM CHLORIDE, SODIUM LACTATE AND CALCIUM CHLORIDE: 600; 310; 30; 20 INJECTION, SOLUTION INTRAVENOUS at 16:01

## 2019-05-12 RX ADMIN — OXYCODONE HYDROCHLORIDE 5 MG: 5 TABLET ORAL at 16:14

## 2019-05-12 RX ADMIN — FENTANYL CITRATE 50 MCG: 50 INJECTION INTRAMUSCULAR; INTRAVENOUS at 14:36

## 2019-05-12 ASSESSMENT — COGNITIVE AND FUNCTIONAL STATUS - GENERAL
SUGGESTED CMS G CODE MODIFIER DAILY ACTIVITY: CH
SUGGESTED CMS G CODE MODIFIER MOBILITY: CH
DAILY ACTIVITIY SCORE: 24
MOBILITY SCORE: 24

## 2019-05-12 ASSESSMENT — LIFESTYLE VARIABLES
AVERAGE NUMBER OF DAYS PER WEEK YOU HAVE A DRINK CONTAINING ALCOHOL: 3
HAVE PEOPLE ANNOYED YOU BY CRITICIZING YOUR DRINKING: NO
EVER FELT BAD OR GUILTY ABOUT YOUR DRINKING: NO
CONSUMPTION TOTAL: NEGATIVE
HAVE YOU EVER FELT YOU SHOULD CUT DOWN ON YOUR DRINKING: NO
TOTAL SCORE: 0
HOW MANY TIMES IN THE PAST YEAR HAVE YOU HAD 5 OR MORE DRINKS IN A DAY: 0
EVER HAD A DRINK FIRST THING IN THE MORNING TO STEADY YOUR NERVES TO GET RID OF A HANGOVER: NO
ALCOHOL_USE: YES
TOTAL SCORE: 0
TOTAL SCORE: 0
ON A TYPICAL DAY WHEN YOU DRINK ALCOHOL HOW MANY DRINKS DO YOU HAVE: 0

## 2019-05-12 ASSESSMENT — ENCOUNTER SYMPTOMS
ABDOMINAL PAIN: 0
CHILLS: 0
PALPITATIONS: 0
NECK PAIN: 0
COUGH: 0
NAUSEA: 0
DEPRESSION: 0
SHORTNESS OF BREATH: 0
DIZZINESS: 0
BLURRED VISION: 0
FEVER: 0
VOMITING: 0
TINGLING: 0
HEADACHES: 0
SORE THROAT: 0
EYE PAIN: 0
INSOMNIA: 0
BACK PAIN: 0

## 2019-05-12 ASSESSMENT — COPD QUESTIONNAIRES
DURING THE PAST 4 WEEKS HOW MUCH DID YOU FEEL SHORT OF BREATH: NONE/LITTLE OF THE TIME
IN THE PAST 12 MONTHS DO YOU DO LESS THAN YOU USED TO BECAUSE OF YOUR BREATHING PROBLEMS: DISAGREE/UNSURE
COPD SCREENING SCORE: 2
DO YOU EVER COUGH UP ANY MUCUS OR PHLEGM?: NO/ONLY WITH OCCASIONAL COLDS OR INFECTIONS
HAVE YOU SMOKED AT LEAST 100 CIGARETTES IN YOUR ENTIRE LIFE: NO/DON'T KNOW

## 2019-05-12 NOTE — CARE PLAN
Problem: Knowledge Deficit  Goal: Knowledge of disease process/condition, treatment plan, diagnostic tests, and medications will improve  Outcome: PROGRESSING AS EXPECTED  Aware of plan for surgery tomorrow.

## 2019-05-12 NOTE — ED PROVIDER NOTES
CHIEF COMPLAINT  Chief Complaint   Patient presents with   • GLF   • Hip Injury     right       HPI  Ang Lancaster is a 84 y.o. female who presents to the emergency department complaining of right hip pain.  The patient was doing fine until she got up from the table this morning and fell she is not really sure why she fell but she immediately felt severe pain in the right hip area and she was unable to get up.  She was able to crawl to the telephone and called family and EMS was called and the patient was brought to the emergency department.  Pain is isolated to the right hip she feels she is otherwise uninjured.  She received pain medication by the EMS crew prior to arrival.    REVIEW OF SYSTEMS no loss of consciousness head pain neck pain extremity pain or torso injury.  All other systems negative    PAST MEDICAL HISTORY  Past Medical History:   Diagnosis Date   • Degenerative joint disease     lbp and neck pain   • [place] (hard of hearing)     rt ear with hearing aide   • IFG (impaired fasting glucose)    • Impaired fasting glucose    • Meige syndrome (blepharospasm with oromandibular dystonia)    • Osteoporosis    • Recurrent UTI    • Risk for falls 10/11/2017   • Vertigo        FAMILY HISTORY  Family History   Problem Relation Age of Onset   • Stroke Mother    • Diabetes Father    • Alcohol/Drug Father         alcholism   • Heart Disease Paternal Grandmother         possible MI       SOCIAL HISTORY  Social History     Social History   • Marital status:      Spouse name: N/A   • Number of children: N/A   • Years of education: N/A     Social History Main Topics   • Smoking status: Former Smoker     Packs/day: 1.50     Years: 45.00     Types: Cigarettes     Quit date: 1/1/2004   • Smokeless tobacco: Never Used   • Alcohol use 3.5 - 17.5 oz/week     7 - 35 Glasses of wine per week   • Drug use: Yes      Comment: CBD oil   • Sexual activity: Not on file     Other Topics Concern   • Not on file     Social History  "Narrative   • No narrative on file       SURGICAL HISTORY  Past Surgical History:   Procedure Laterality Date   • HIP CANNULATED SCREW Left 4/22/2018    Procedure: HIP CANNULATED SCREW;  Surgeon: Hu Galaviz M.D.;  Location: SURGERY [facility];  Service: Orthopedics   • TUBAL COAGULATION LAPAROSCOPIC BILATERAL  1973       CURRENT MEDICATIONS  Home Medications     Reviewed by Nellie Reynaga (Pharmacy Tech) on 05/12/19 at 1435  Med List Status: Complete   Medication Last Dose Status   Calcium-Vitamin D-Vitamin K (CHEWABLE CALCIUM PO) 5/10/2019 Active   diazePAM (VALIUM) 5 MG Tab 5/12/2019 Active   naproxen (ALEVE) 220 MG tablet > 2 days Active                ALLERGIES  Allergies   Allergen Reactions   • Other Environmental Runny Nose and Itching     pollens       PHYSICAL EXAM  VITAL SIGNS: /65   Pulse 69   Temp 36.2 °C (97.1 °F) (Temporal)   Resp (!) 7   Ht 1.626 m (5' 4\")   Wt 63.6 kg (140 lb 5.2 oz)   SpO2 (!) 77%   BMI 24.09 kg/m²    Oxygen saturation is interpreted adequate at the time of arrival   constitutional: Awake verbal and in no distress  HENT: See any definitive sign of trauma to the head  Eyes: Pupils round extraocular motion present  Neck: C-spine nontender  Cardiovascular: Regular rate and rhythm  Lungs: Clear and equal bilaterally with no difficulty breathing no chest wall pain.  Abdomen/Back: Soft nontender no peritoneal findings  Skin: Warm and dry  Musculoskeletal: The right leg is shortened compared to the left the foot is warm and well-perfused  Neurologic: Awake verbal and moving the other extremities without difficulty    Laboratory  CBC shows white blood cell count of 11.7 hemoglobin is adequate 12.9 basic metabolic panel is unremarkable LFTs are unremarkable INR is normal    EKG interpretation  Twelve-lead EKG shows sinus rhythm 61 bpm no pathologic ST elevation or depression    Radiology  DX-CHEST-PORTABLE (1 VIEW)   Final Result      No definite evidence of " acute cardiac pulmonary disease.      Lucency along the lateral right lung likely due to skin fold as opposed to pneumothorax.      DX-PELVIS-1 OR 2 VIEWS   Final Result      Right proximal femoral impacted subcapital fracture.      DX-HIP-UNILATERAL-WITH PELVIS-1 VIEW RIGHT   Final Result      1.  Right proximal femoral impacted subcapital fracture.      2.  Surgical change involving the left hip with degenerative change.        MEDICAL DECISION MAKING and DISPOSITION  In the emergency department an IV was established the patient was kept n.p.o. I reviewed the findings with Dr. Billingsley from orthopedics and he will provide orthopedic consultation I reviewed the case with the hospitalist the patient is admitted to the hospital service for further evaluation and treatment I reviewed the findings with the patient and her family    IMPRESSION  1.  Right hip fracture           Electronically signed by: Eliezer Holman, 5/12/2019 3:38 PM

## 2019-05-12 NOTE — ED TRIAGE NOTES
Pt to ed by ginger, pt had glf today . C/o right hip pain   Iv placed by remsa pta, 100 mg fentanyl pt

## 2019-05-12 NOTE — PROGRESS NOTES
Arrived to unit via bed. Awake and alert.  Slid into bed with slide board.2 RN skin assessment done; skin WDL.

## 2019-05-12 NOTE — CONSULTS
Orthopaedic Consult / H&P Note  Date: 5/12/2019    ATTENDING: Mo Joaquin MD    CHIEF COMPLAINT: Right hip pain    HISTORY OF PRESENT ILLNESS: Ang Lancaster is a 84 y.o. who presents with right hip pain after a ground level fall earlier today.  She Does not know exactly why she fell. She was brought in by ambulance and was found to have a right hip fracture for which ortho was consulted.  She notes that she had a left hip fracture 1 year ago which was fixed with surgery and she states that she has no issues with the left hip.  Denies any other injuries and states that she is otherwise in her usual state of health.    Past Medical History:   Diagnosis Date   • Degenerative joint disease     lbp and neck pain   • [place] (hard of hearing)     rt ear with hearing aide   • IFG (impaired fasting glucose)    • Impaired fasting glucose    • Meige syndrome (blepharospasm with oromandibular dystonia)    • Osteoporosis    • Recurrent UTI    • Risk for falls 10/11/2017   • Vertigo        Past Surgical History:   Procedure Laterality Date   • HIP CANNULATED SCREW Left 4/22/2018    Procedure: HIP CANNULATED SCREW;  Surgeon: Hu Galaviz M.D.;  Location: SURGERY [facility];  Service: Orthopedics   • TUBAL COAGULATION LAPAROSCOPIC BILATERAL  1973       No current facility-administered medications on file prior to encounter.      No current outpatient prescriptions on file prior to encounter.       Allergies: Other environmental    Social History     Social History   • Marital status:      Spouse name: N/A   • Number of children: N/A   • Years of education: N/A     Occupational History   • Not on file.     Social History Main Topics   • Smoking status: Former Smoker     Packs/day: 1.50     Years: 45.00     Types: Cigarettes     Quit date: 1/1/2004   • Smokeless tobacco: Never Used   • Alcohol use 3.5 - 17.5 oz/week     7 - 35 Glasses of wine per week   • Drug use: Yes      Comment: CBD oil   • Sexual activity: Not  "on file     Other Topics Concern   • Not on file     Social History Narrative   • No narrative on file       Family History   Problem Relation Age of Onset   • Stroke Mother    • Diabetes Father    • Alcohol/Drug Father         alcholism   • Heart Disease Paternal Grandmother         possible MI       REVIEW OF SYSTEMS: Full 13-point review of systems obtained with positives   noted in the HPI above, all others negative.    PHYSICAL EXAMINATION:  Vitals:    05/12/19 1244 05/12/19 1354 05/12/19 1424 05/12/19 1454   BP: 109/65      Pulse: 70 (!) 57 (!) 56 69   Resp: 16 15 (!) 29 (!) 7   Temp: 36.2 °C (97.1 °F)      TempSrc: Temporal      SpO2:  89% 98% (!) 77%   Weight: 63.6 kg (140 lb 5.2 oz)      Height: 1.626 m (5' 4\")          GENERAL: well appearing and in no acute distress  CARDIOVASCULAR: Regular rate  RESPIRATORY: Unlabored  EXTREMITIES:     RLE: appropriately TTP about the hip, no pain to palpation, no crepitus, full painless range of motion throughout, wiggles toes and fires ankle plantar and dorsiflexors; endorses sensation intact to light touch throughout foot, palpable DP and PT pulses, toes are warm and pink    LLE: Well healed surgical scar, grossly atraumatic, no pain to palpation, painless range of motion throughout, wiggles toes and fires ankle plantar and dorsiflexors; endorses sensation intact to light touch throughout foot, palpable DP and PT pulses, toes are warm and pink        LABORATORY DATA:     Lab Results   Component Value Date/Time    WBC 11.7 (H) 05/12/2019 02:48 PM    WBC 5.5 06/08/2011 09:08 AM    RBC 4.07 (L) 05/12/2019 02:48 PM    RBC 4.44 06/08/2011 09:08 AM    HEMOGLOBIN 12.9 05/12/2019 02:48 PM    HEMATOCRIT 38.8 05/12/2019 02:48 PM        Lab Results   Component Value Date/Time    SODIUM 135 05/12/2019 02:48 PM    POTASSIUM 4.0 05/12/2019 02:48 PM    CHLORIDE 103 05/12/2019 02:48 PM    CO2 25 05/12/2019 02:48 PM    GLUCOSE 101 (H) 05/12/2019 02:48 PM    BUN 20 05/12/2019 02:48 PM "    CREATININE 0.87 05/12/2019 02:48 PM    CREATININE 0.88 11/28/2012 10:01 AM    BUNCREATRAT 11 03/02/2016 09:16 AM    BUNCREATRAT 15 11/28/2012 10:01 AM    GLOMRATE >59 11/19/2010 11:05 AM        Lab Results   Component Value Date/Time    PROTHROMBTM 12.9 05/12/2019 02:48 PM    INR 0.98 05/12/2019 02:48 PM          IMAGING: Left hip impacted subcapital femoral neck fracture.  Apparent backout of annulated screws in right hip.    ASSESSMENT AND PLAN:   Admit to hospitalist for medical optimization  Plan for OR tomorrow  Diet: NPO at midnight  Weight Bearing Status-NWB on RLE  DVT Prophylaxis- TEDS/SCDs, hold chemoppx for OR tomorrow    Hernan Billingsley MD  [facility] Arthroplasty Fellow

## 2019-05-13 ENCOUNTER — APPOINTMENT (OUTPATIENT)
Dept: RADIOLOGY | Facility: MEDICAL CENTER | Age: 84
DRG: 481 | End: 2019-05-13
Attending: ORTHOPAEDIC SURGERY
Payer: MEDICARE

## 2019-05-13 ENCOUNTER — ANESTHESIA (OUTPATIENT)
Dept: SURGERY | Facility: MEDICAL CENTER | Age: 84
DRG: 481 | End: 2019-05-13
Payer: MEDICARE

## 2019-05-13 ENCOUNTER — ANESTHESIA EVENT (OUTPATIENT)
Dept: SURGERY | Facility: MEDICAL CENTER | Age: 84
DRG: 481 | End: 2019-05-13
Payer: MEDICARE

## 2019-05-13 LAB
ALBUMIN SERPL BCP-MCNC: 3.5 G/DL (ref 3.2–4.9)
ALBUMIN/GLOB SERPL: 1.4 G/DL
ALP SERPL-CCNC: 42 U/L (ref 30–99)
ALT SERPL-CCNC: 19 U/L (ref 2–50)
ANION GAP SERPL CALC-SCNC: 9 MMOL/L (ref 0–11.9)
AST SERPL-CCNC: 26 U/L (ref 12–45)
BASOPHILS # BLD AUTO: 0.6 % (ref 0–1.8)
BASOPHILS # BLD: 0.06 K/UL (ref 0–0.12)
BILIRUB SERPL-MCNC: 1.3 MG/DL (ref 0.1–1.5)
BUN SERPL-MCNC: 15 MG/DL (ref 8–22)
CALCIUM SERPL-MCNC: 8.7 MG/DL (ref 8.4–10.2)
CHLORIDE SERPL-SCNC: 103 MMOL/L (ref 96–112)
CO2 SERPL-SCNC: 22 MMOL/L (ref 20–33)
CREAT SERPL-MCNC: 0.84 MG/DL (ref 0.5–1.4)
EOSINOPHIL # BLD AUTO: 0.12 K/UL (ref 0–0.51)
EOSINOPHIL NFR BLD: 1.2 % (ref 0–6.9)
ERYTHROCYTE [DISTWIDTH] IN BLOOD BY AUTOMATED COUNT: 43.5 FL (ref 35.9–50)
GLOBULIN SER CALC-MCNC: 2.5 G/DL (ref 1.9–3.5)
GLUCOSE SERPL-MCNC: 129 MG/DL (ref 65–99)
HCT VFR BLD AUTO: 38.4 % (ref 37–47)
HGB BLD-MCNC: 13.1 G/DL (ref 12–16)
IMM GRANULOCYTES # BLD AUTO: 0.04 K/UL (ref 0–0.11)
IMM GRANULOCYTES NFR BLD AUTO: 0.4 % (ref 0–0.9)
LYMPHOCYTES # BLD AUTO: 1.31 K/UL (ref 1–4.8)
LYMPHOCYTES NFR BLD: 12.6 % (ref 22–41)
MCH RBC QN AUTO: 32 PG (ref 27–33)
MCHC RBC AUTO-ENTMCNC: 34.1 G/DL (ref 33.6–35)
MCV RBC AUTO: 93.7 FL (ref 81.4–97.8)
MONOCYTES # BLD AUTO: 0.7 K/UL (ref 0–0.85)
MONOCYTES NFR BLD AUTO: 6.8 % (ref 0–13.4)
NEUTROPHILS # BLD AUTO: 8.14 K/UL (ref 2–7.15)
NEUTROPHILS NFR BLD: 78.4 % (ref 44–72)
NRBC # BLD AUTO: 0 K/UL
NRBC BLD-RTO: 0 /100 WBC
PLATELET # BLD AUTO: 206 K/UL (ref 164–446)
PMV BLD AUTO: 10.7 FL (ref 9–12.9)
POTASSIUM SERPL-SCNC: 3.6 MMOL/L (ref 3.6–5.5)
PROT SERPL-MCNC: 6 G/DL (ref 6–8.2)
RBC # BLD AUTO: 4.1 M/UL (ref 4.2–5.4)
SODIUM SERPL-SCNC: 134 MMOL/L (ref 135–145)
WBC # BLD AUTO: 10.4 K/UL (ref 4.8–10.8)

## 2019-05-13 PROCEDURE — 0QS634Z REPOSITION RIGHT UPPER FEMUR WITH INTERNAL FIXATION DEVICE, PERCUTANEOUS APPROACH: ICD-10-PCS | Performed by: ORTHOPAEDIC SURGERY

## 2019-05-13 PROCEDURE — A9270 NON-COVERED ITEM OR SERVICE: HCPCS | Performed by: HOSPITALIST

## 2019-05-13 PROCEDURE — 700102 HCHG RX REV CODE 250 W/ 637 OVERRIDE(OP): Performed by: HOSPITALIST

## 2019-05-13 PROCEDURE — 99232 SBSQ HOSP IP/OBS MODERATE 35: CPT | Performed by: INTERNAL MEDICINE

## 2019-05-13 PROCEDURE — A6402 STERILE GAUZE <= 16 SQ IN: HCPCS | Performed by: ORTHOPAEDIC SURGERY

## 2019-05-13 PROCEDURE — 700101 HCHG RX REV CODE 250: Performed by: ANESTHESIOLOGY

## 2019-05-13 PROCEDURE — 770006 HCHG ROOM/CARE - MED/SURG/GYN SEMI*

## 2019-05-13 PROCEDURE — A6222 GAUZE <=16 IN NO W/SAL W/O B: HCPCS | Performed by: ORTHOPAEDIC SURGERY

## 2019-05-13 PROCEDURE — 160041 HCHG SURGERY MINUTES - EA ADDL 1 MIN LEVEL 4: Performed by: ORTHOPAEDIC SURGERY

## 2019-05-13 PROCEDURE — 160029 HCHG SURGERY MINUTES - 1ST 30 MINS LEVEL 4: Performed by: ORTHOPAEDIC SURGERY

## 2019-05-13 PROCEDURE — 73502 X-RAY EXAM HIP UNI 2-3 VIEWS: CPT | Mod: RT

## 2019-05-13 PROCEDURE — A9270 NON-COVERED ITEM OR SERVICE: HCPCS | Performed by: ORTHOPAEDIC SURGERY

## 2019-05-13 PROCEDURE — 700111 HCHG RX REV CODE 636 W/ 250 OVERRIDE (IP): Performed by: ANESTHESIOLOGY

## 2019-05-13 PROCEDURE — 36415 COLL VENOUS BLD VENIPUNCTURE: CPT

## 2019-05-13 PROCEDURE — 700111 HCHG RX REV CODE 636 W/ 250 OVERRIDE (IP): Performed by: HOSPITALIST

## 2019-05-13 PROCEDURE — 160035 HCHG PACU - 1ST 60 MINS PHASE I: Performed by: ORTHOPAEDIC SURGERY

## 2019-05-13 PROCEDURE — 700111 HCHG RX REV CODE 636 W/ 250 OVERRIDE (IP): Performed by: ORTHOPAEDIC SURGERY

## 2019-05-13 PROCEDURE — C1713 ANCHOR/SCREW BN/BN,TIS/BN: HCPCS | Performed by: ORTHOPAEDIC SURGERY

## 2019-05-13 PROCEDURE — 160048 HCHG OR STATISTICAL LEVEL 1-5: Performed by: ORTHOPAEDIC SURGERY

## 2019-05-13 PROCEDURE — 160002 HCHG RECOVERY MINUTES (STAT): Performed by: ORTHOPAEDIC SURGERY

## 2019-05-13 PROCEDURE — 85025 COMPLETE CBC W/AUTO DIFF WBC: CPT

## 2019-05-13 PROCEDURE — 501445 HCHG STAPLER, SKIN DISP: Performed by: ORTHOPAEDIC SURGERY

## 2019-05-13 PROCEDURE — 700112 HCHG RX REV CODE 229: Performed by: ORTHOPAEDIC SURGERY

## 2019-05-13 PROCEDURE — 80053 COMPREHEN METABOLIC PANEL: CPT

## 2019-05-13 PROCEDURE — 160036 HCHG PACU - EA ADDL 30 MINS PHASE I: Performed by: ORTHOPAEDIC SURGERY

## 2019-05-13 PROCEDURE — 503036 HCHG GUIDE PIN,OIC: Performed by: ORTHOPAEDIC SURGERY

## 2019-05-13 PROCEDURE — 700105 HCHG RX REV CODE 258: Performed by: HOSPITALIST

## 2019-05-13 PROCEDURE — 700102 HCHG RX REV CODE 250 W/ 637 OVERRIDE(OP): Performed by: ORTHOPAEDIC SURGERY

## 2019-05-13 PROCEDURE — 160009 HCHG ANES TIME/MIN: Performed by: ORTHOPAEDIC SURGERY

## 2019-05-13 DEVICE — SCREW CANNNULATED 16MM THREAD 7.3MM X 85MM (3TX3=9): Type: IMPLANTABLE DEVICE | Site: HIP | Status: FUNCTIONAL

## 2019-05-13 DEVICE — SCREW CANNNULATED 16MM THREAD 7.3MM X 90MM (3TX3=9): Type: IMPLANTABLE DEVICE | Site: HIP | Status: FUNCTIONAL

## 2019-05-13 RX ORDER — DEXMEDETOMIDINE HYDROCHLORIDE 100 UG/ML
INJECTION, SOLUTION INTRAVENOUS PRN
Status: DISCONTINUED | OUTPATIENT
Start: 2019-05-13 | End: 2019-05-13 | Stop reason: SURG

## 2019-05-13 RX ORDER — ACETAMINOPHEN 325 MG/1
650 TABLET ORAL EVERY 6 HOURS
Status: DISCONTINUED | OUTPATIENT
Start: 2019-05-13 | End: 2019-05-16 | Stop reason: HOSPADM

## 2019-05-13 RX ORDER — HALOPERIDOL 5 MG/ML
1 INJECTION INTRAMUSCULAR EVERY 6 HOURS PRN
Status: DISCONTINUED | OUTPATIENT
Start: 2019-05-13 | End: 2019-05-16 | Stop reason: HOSPADM

## 2019-05-13 RX ORDER — POLYETHYLENE GLYCOL 3350 17 G/17G
1 POWDER, FOR SOLUTION ORAL 2 TIMES DAILY PRN
Status: DISCONTINUED | OUTPATIENT
Start: 2019-05-13 | End: 2019-05-16 | Stop reason: HOSPADM

## 2019-05-13 RX ORDER — KETOROLAC TROMETHAMINE 30 MG/ML
15 INJECTION, SOLUTION INTRAMUSCULAR; INTRAVENOUS EVERY 6 HOURS
Status: DISCONTINUED | OUTPATIENT
Start: 2019-05-13 | End: 2019-05-16

## 2019-05-13 RX ORDER — AMOXICILLIN 250 MG
1 CAPSULE ORAL
Status: DISCONTINUED | OUTPATIENT
Start: 2019-05-13 | End: 2019-05-16 | Stop reason: HOSPADM

## 2019-05-13 RX ORDER — SODIUM CHLORIDE, SODIUM LACTATE, POTASSIUM CHLORIDE, CALCIUM CHLORIDE 600; 310; 30; 20 MG/100ML; MG/100ML; MG/100ML; MG/100ML
INJECTION, SOLUTION INTRAVENOUS CONTINUOUS
Status: DISCONTINUED | OUTPATIENT
Start: 2019-05-13 | End: 2019-05-13 | Stop reason: HOSPADM

## 2019-05-13 RX ORDER — DEXAMETHASONE SODIUM PHOSPHATE 4 MG/ML
4 INJECTION, SOLUTION INTRA-ARTICULAR; INTRALESIONAL; INTRAMUSCULAR; INTRAVENOUS; SOFT TISSUE
Status: DISCONTINUED | OUTPATIENT
Start: 2019-05-13 | End: 2019-05-16 | Stop reason: HOSPADM

## 2019-05-13 RX ORDER — DIPHENHYDRAMINE HYDROCHLORIDE 50 MG/ML
25 INJECTION INTRAMUSCULAR; INTRAVENOUS EVERY 6 HOURS PRN
Status: DISCONTINUED | OUTPATIENT
Start: 2019-05-13 | End: 2019-05-16 | Stop reason: HOSPADM

## 2019-05-13 RX ORDER — CEFAZOLIN SODIUM 1 G/3ML
INJECTION, POWDER, FOR SOLUTION INTRAMUSCULAR; INTRAVENOUS PRN
Status: DISCONTINUED | OUTPATIENT
Start: 2019-05-13 | End: 2019-05-13 | Stop reason: SURG

## 2019-05-13 RX ORDER — DOCUSATE SODIUM 100 MG/1
100 CAPSULE, LIQUID FILLED ORAL 2 TIMES DAILY
Status: DISCONTINUED | OUTPATIENT
Start: 2019-05-13 | End: 2019-05-16 | Stop reason: HOSPADM

## 2019-05-13 RX ORDER — AMOXICILLIN 250 MG
1 CAPSULE ORAL NIGHTLY
Status: DISCONTINUED | OUTPATIENT
Start: 2019-05-13 | End: 2019-05-16 | Stop reason: HOSPADM

## 2019-05-13 RX ORDER — ONDANSETRON 2 MG/ML
4 INJECTION INTRAMUSCULAR; INTRAVENOUS
Status: DISCONTINUED | OUTPATIENT
Start: 2019-05-13 | End: 2019-05-13 | Stop reason: HOSPADM

## 2019-05-13 RX ORDER — AMPICILLIN AND SULBACTAM 2; 1 G/1; G/1
INJECTION, POWDER, FOR SOLUTION INTRAMUSCULAR; INTRAVENOUS
Status: ACTIVE
Start: 2019-05-13 | End: 2019-05-14

## 2019-05-13 RX ORDER — DIAZEPAM 5 MG/1
2.5 TABLET ORAL DAILY
Status: DISCONTINUED | OUTPATIENT
Start: 2019-05-14 | End: 2019-05-16 | Stop reason: HOSPADM

## 2019-05-13 RX ORDER — MEPERIDINE HYDROCHLORIDE 25 MG/ML
6.25 INJECTION INTRAMUSCULAR; INTRAVENOUS; SUBCUTANEOUS
Status: DISCONTINUED | OUTPATIENT
Start: 2019-05-13 | End: 2019-05-13 | Stop reason: HOSPADM

## 2019-05-13 RX ORDER — SCOLOPAMINE TRANSDERMAL SYSTEM 1 MG/1
1 PATCH, EXTENDED RELEASE TRANSDERMAL
Status: DISCONTINUED | OUTPATIENT
Start: 2019-05-13 | End: 2019-05-16 | Stop reason: HOSPADM

## 2019-05-13 RX ORDER — ENEMA 19; 7 G/133ML; G/133ML
1 ENEMA RECTAL
Status: DISCONTINUED | OUTPATIENT
Start: 2019-05-13 | End: 2019-05-16 | Stop reason: HOSPADM

## 2019-05-13 RX ORDER — ONDANSETRON 2 MG/ML
4 INJECTION INTRAMUSCULAR; INTRAVENOUS EVERY 4 HOURS PRN
Status: DISCONTINUED | OUTPATIENT
Start: 2019-05-13 | End: 2019-05-16 | Stop reason: HOSPADM

## 2019-05-13 RX ORDER — NEOSTIGMINE METHYLSULFATE 1 MG/ML
INJECTION, SOLUTION INTRAVENOUS PRN
Status: DISCONTINUED | OUTPATIENT
Start: 2019-05-13 | End: 2019-05-13 | Stop reason: SURG

## 2019-05-13 RX ORDER — BISACODYL 10 MG
10 SUPPOSITORY, RECTAL RECTAL
Status: DISCONTINUED | OUTPATIENT
Start: 2019-05-13 | End: 2019-05-16 | Stop reason: HOSPADM

## 2019-05-13 RX ADMIN — FAMOTIDINE 20 MG: 20 TABLET ORAL at 06:00

## 2019-05-13 RX ADMIN — ROCURONIUM BROMIDE 13 MG: 10 INJECTION INTRAVENOUS at 16:17

## 2019-05-13 RX ADMIN — KETOROLAC TROMETHAMINE 15 MG: 30 INJECTION, SOLUTION INTRAMUSCULAR; INTRAVENOUS at 20:05

## 2019-05-13 RX ADMIN — DIPHENHYDRAMINE HYDROCHLORIDE 25 MG: 50 INJECTION, SOLUTION INTRAMUSCULAR; INTRAVENOUS at 22:08

## 2019-05-13 RX ADMIN — ALFENTANIL HYDROCHLORIDE 250 MCG: 500 INJECTION, SOLUTION INTRAVENOUS at 16:25

## 2019-05-13 RX ADMIN — CEFAZOLIN 2 G: 1 INJECTION, POWDER, FOR SOLUTION INTRAVENOUS at 16:13

## 2019-05-13 RX ADMIN — DEXMEDETOMIDINE HYDROCHLORIDE 10 MCG: 100 INJECTION, SOLUTION, CONCENTRATE INTRAVENOUS at 16:15

## 2019-05-13 RX ADMIN — ACETAMINOPHEN 650 MG: 325 TABLET, FILM COATED ORAL at 20:05

## 2019-05-13 RX ADMIN — GLYCOPYRROLATE 0.3 MG: 0.2 INJECTION, SOLUTION INTRAMUSCULAR; INTRAVENOUS at 16:39

## 2019-05-13 RX ADMIN — DIAZEPAM 5 MG: 5 TABLET ORAL at 06:00

## 2019-05-13 RX ADMIN — PROPOFOL 30 MG: 10 INJECTION, EMULSION INTRAVENOUS at 16:25

## 2019-05-13 RX ADMIN — MORPHINE SULFATE 2 MG: 4 INJECTION INTRAVENOUS at 07:54

## 2019-05-13 RX ADMIN — STANDARDIZED SENNA CONCENTRATE AND DOCUSATE SODIUM 2 TABLET: 8.6; 5 TABLET, FILM COATED ORAL at 06:00

## 2019-05-13 RX ADMIN — DOCUSATE SODIUM 100 MG: 100 CAPSULE, LIQUID FILLED ORAL at 20:05

## 2019-05-13 RX ADMIN — DEXMEDETOMIDINE HYDROCHLORIDE 5 MCG: 100 INJECTION, SOLUTION, CONCENTRATE INTRAVENOUS at 16:32

## 2019-05-13 RX ADMIN — OXYCODONE HYDROCHLORIDE 5 MG: 5 TABLET ORAL at 09:44

## 2019-05-13 RX ADMIN — SENNOSIDES, DOCUSATE SODIUM 1 TABLET: 50; 8.6 TABLET, FILM COATED ORAL at 20:05

## 2019-05-13 RX ADMIN — NEOSTIGMINE METHYLSULFATE 1.5 MG: 1 INJECTION INTRAVENOUS at 16:39

## 2019-05-13 RX ADMIN — ENOXAPARIN SODIUM 40 MG: 40 INJECTION, SOLUTION INTRAVENOUS; SUBCUTANEOUS at 06:00

## 2019-05-13 RX ADMIN — PROPOFOL 30 MG: 10 INJECTION, EMULSION INTRAVENOUS at 16:15

## 2019-05-13 RX ADMIN — HALOPERIDOL LACTATE 1 MG: 5 INJECTION, SOLUTION INTRAMUSCULAR at 23:47

## 2019-05-13 RX ADMIN — ALFENTANIL HYDROCHLORIDE 500 MCG: 500 INJECTION, SOLUTION INTRAVENOUS at 16:16

## 2019-05-13 RX ADMIN — SODIUM CHLORIDE, POTASSIUM CHLORIDE, SODIUM LACTATE AND CALCIUM CHLORIDE: 600; 310; 30; 20 INJECTION, SOLUTION INTRAVENOUS at 12:13

## 2019-05-13 ASSESSMENT — PAIN SCALES - GENERAL: PAIN_LEVEL: 3

## 2019-05-13 NOTE — CARE PLAN
Problem: Nutritional:  Goal: Achieve adequate nutritional intake  Diet advancement when medically feasible + PO intake >50%   Outcome: NOT MET

## 2019-05-13 NOTE — PROGRESS NOTES
Pt pleasant cooperative. Alert and oriented x3. Resting quietly in bed at this time. Daughter dank called and this writer updated on pt status.

## 2019-05-13 NOTE — DISCHARGE PLANNING
Care Transition Team Assessment    Information Source  Orientation : Disoriented to Time, Disoriented to Place  Information Given By: Patient  Informant's Name: Ang  Elopement Risk  Legal Hold: No  Ambulatory or Self Mobile in Wheelchair: No-Not an Elopement Risk    Interdisciplinary Discharge Planning  Does Admitting Nurse Feel This Could be a Complex Discharge?: No  Primary Care Physician: Sandy Trent  Lives with - Patient's Self Care Capacity: Alone and Able to Care For Self  Patient or legal guardian wants to designate a caregiver (see row info): No  Housing / Facility: [address]  Do You Take your Prescribed Medications Regularly: Yes  Able to Return to Previous ADL's: Yes  Mobility Issues: Yes  Assistance Needed: No  Durable Medical Equipment: Walker    Discharge Preparedness  What is your plan after discharge?: Home with help    Finances  Financial Barriers to Discharge: No  Prescription Coverage: Yes    Advance Directive  Advance Directive?: None    Domestic Abuse  Have you ever been the victim of abuse or violence?: No    Psychological Assessment  History of Substance Abuse: None    Discharge Risks or Barriers  Discharge risks or barriers?: No    Anticipated Discharge Information  Anticipated discharge disposition: Discharge needs currently unknown

## 2019-05-13 NOTE — ANESTHESIA PREPROCEDURE EVALUATION
Relevant Problems   (+) Closed right hip fracture (HCC)   (+) GERD (gastroesophageal reflux disease)   (+) [place] (hard of hearing)   (+) Meige syndrome (blepharospasm with oromandibular dystonia)   (+) Vertigo       Physical Exam    Airway   Mallampati: I  TM distance: >3 FB  Neck ROM: limited       Cardiovascular   Rhythm: regular  Rate: normal     Dental    Pulmonary   Breath sounds clear to auscultation     Abdominal   Abdomen: soft     Neurological - abnormal exam    Comments: Mental status not fully oriented         Altered mental status, frailty    Anesthesia Plan    ASA 3   ASA physical status 3 criteria: other (comment)    Plan - general       Airway plan will be ETT                  Informed Consent:        GETA; consent from daughter

## 2019-05-13 NOTE — H&P
Hospital Medicine History & Physical Note    Date of Service  5/12/2019    Primary Care Physician  ABIMBOLA Maria.    Consultants  ortho    Code Status  DNR  Advanced care planning- I spent 16min in addition to the 37min e/m admission time today discussing code status, CPR, Intubation, tube feeding and desire for aggressive measures. She wishes to be a DNR, Tube feeding ok. We filled out a polst form and I placed DNR ordered in the chart.     Chief Complaint  Hip pain    History of Presenting Illness  84 y.o. female who presented 5/12/2019 with a ground level fall and hip pain today. She thinks she fell because her slippers were slippery on the floor. She fell on her right hip and has been found to have a fracture. Ortho is planning on a repair tomorrow. Her pain is worse with movement and not reveived by anything but meds given here. She has otherwise not been ill recently.   Review of Systems  Review of Systems   Constitutional: Negative for chills and fever.   HENT: Negative for sore throat.    Eyes: Negative for blurred vision and pain.   Respiratory: Negative for cough and shortness of breath.    Cardiovascular: Negative for chest pain and palpitations.   Gastrointestinal: Negative for abdominal pain, nausea and vomiting.   Genitourinary: Negative for dysuria and urgency.   Musculoskeletal: Positive for joint pain. Negative for back pain and neck pain.   Skin: Negative for itching and rash.   Neurological: Negative for dizziness, tingling and headaches.   Psychiatric/Behavioral: Negative for depression. The patient does not have insomnia.    All other systems reviewed and are negative.      Past Medical History   has a past medical history of Degenerative joint disease; [place] (hard of hearing); IFG (impaired fasting glucose); Impaired fasting glucose; Meige syndrome (blepharospasm with oromandibular dystonia); Osteoporosis; Recurrent UTI; Risk for falls (10/11/2017); and Vertigo. She also has no past medical  history of Diabetes.    Surgical History   has a past surgical history that includes tubal coagulation laparoscopic bilateral (1973) and hip cannulated screw (Left, 4/22/2018).     Family History  family history includes Alcohol/Drug in her father; Diabetes in her father; Heart Disease in her paternal grandmother; Stroke in her mother.     Social History   reports that she quit smoking about 15 years ago. Her smoking use included Cigarettes. She has a 67.50 pack-year smoking history. She has never used smokeless tobacco. She reports that she drinks about 3.5 - 17.5 oz of alcohol per week . She reports that she uses drugs.    Allergies  Allergies   Allergen Reactions   • Other Environmental Runny Nose and Itching     pollens       Medications  Prior to Admission Medications   Prescriptions Last Dose Informant Patient Reported? Taking?   Calcium-Vitamin D-Vitamin K (CHEWABLE CALCIUM PO) 5/10/2019 at PM Patient Yes Yes   Sig: Take 1 Tab by mouth every day.   diazePAM (VALIUM) 5 MG Tab 5/12/2019 at 0900 Patient Yes Yes   Sig: Take 1.25-2.5 mg by mouth 2 Times a Day. Pt takes 2.5MG in AM and 1.25MG sometimes at night   naproxen (ALEVE) 220 MG tablet > 2 days at Unknown Patient Yes Yes   Sig: Take 220 mg by mouth as needed.      Facility-Administered Medications: None       Physical Exam  Temp:  [36.2 °C (97.1 °F)-36.3 °C (97.3 °F)] 36.3 °C (97.3 °F)  Pulse:  [53-70] 53  Resp:  [7-29] 18  BP: (109-114)/(57-65) 114/57  SpO2:  [77 %-98 %] 96 %    Physical Exam   Constitutional: She is oriented to person, place, and time. She appears well-developed and well-nourished. No distress.   Patient seen and examined  Discussed plan with RN   HENT:   Right Ear: External ear normal.   Left Ear: External ear normal.   Nose: Nose normal.   Eyes: Conjunctivae are normal. Right eye exhibits no discharge. Left eye exhibits no discharge.   Neck: No JVD present.   Cardiovascular: Regular rhythm and normal heart sounds.    No murmur  heard.  Cap refill 2sec  Pulses 2+ throughout     Pulmonary/Chest: Effort normal and breath sounds normal. No stridor. No respiratory distress. She has no wheezes. She has no rales.   Abdominal: Soft. Bowel sounds are normal. She exhibits no distension. There is no tenderness.   Musculoskeletal: She exhibits edema and tenderness.   Neurological: She is alert and oriented to person, place, and time.   Skin: Skin is warm and dry. She is not diaphoretic. No erythema.   Normal skin  Color.    Psychiatric: She has a normal mood and affect. Her behavior is normal.   Nursing note and vitals reviewed.      Laboratory:  Recent Labs      05/12/19   1448   WBC  11.7*   RBC  4.07*   HEMOGLOBIN  12.9   HEMATOCRIT  38.8   MCV  95.3   MCH  31.7   MCHC  33.2*   RDW  45.1   PLATELETCT  183   MPV  10.4     Recent Labs      05/12/19   1448   SODIUM  135   POTASSIUM  4.0   CHLORIDE  103   CO2  25   GLUCOSE  101*   BUN  20   CREATININE  0.87   CALCIUM  8.9     Recent Labs      05/12/19   1448   ALTSGPT  19   ASTSGOT  22   ALKPHOSPHAT  44   TBILIRUBIN  0.9   GLUCOSE  101*     Recent Labs      05/12/19   1448   APTT  29.4   INR  0.98             No results for input(s): TROPONINI in the last 72 hours.    Urinalysis:    No results found     Imaging:  DX-CHEST-PORTABLE (1 VIEW)   Final Result      No definite evidence of acute cardiac pulmonary disease.      Lucency along the lateral right lung likely due to skin fold as opposed to pneumothorax.      DX-PELVIS-1 OR 2 VIEWS   Final Result      Right proximal femoral impacted subcapital fracture.      DX-HIP-UNILATERAL-WITH PELVIS-1 VIEW RIGHT   Final Result      1.  Right proximal femoral impacted subcapital fracture.      2.  Surgical change involving the left hip with degenerative change.            Assessment/Plan:  I anticipate this patient will require at least two midnights for appropriate medical management, necessitating inpatient admission.    * Closed right hip fracture (HCC)    Assessment & Plan    Ortho following for repair.   Pain control  Pt/ot  Dvt proph     GERD (gastroesophageal reflux disease)   Assessment & Plan    pepcid     Meige syndrome (blepharospasm with oromandibular dystonia)- (present on admission)   Assessment & Plan    valium     Vertigo- (present on admission)   Assessment & Plan    Valium prn         VTE prophylaxis: scd

## 2019-05-13 NOTE — OR NURSING
Patient brought up to preop, patient shaking but denies feeling cold.  Patient confused to person, place time, denies pain.  Patient on room air upon arrival, O2 reads 87%, pleaced on 3lpm O2.  Wrist band attached to patient.  Patient unable to report if she has had a sleep study or any other information relevant to stop bang status.  Garrett, RNs called and spoke to daughter, POA to obtain consent for procedure.  Anesthesia and surgeon met with patient, spoke with family on phone.

## 2019-05-13 NOTE — THERAPY
Occupational therapy- OT orders rec'd, pt pending surgery for hip fx.  Will await post op orders to complete eval.

## 2019-05-13 NOTE — ANESTHESIA PROCEDURE NOTES
Airway  Date/Time: 5/13/2019 4:18 PM  Performed by: JANEY WORRELL JR  Authorized by: JANEY WORRELL JR     Location:  OR  Urgency:  Elective  Indications for Airway Management:  Anesthesia  Spontaneous Ventilation: absent    Sedation Level:  Deep  Preoxygenated: Yes    Patient Position:  Sniffing  Final Airway Type:  Endotracheal airway  Final Endotracheal Airway:  ETT  Cuffed: Yes    Technique Used for Successful ETT Placement:  Direct laryngoscopy  Insertion Site:  Oral  Blade Type:  Jasbir  Laryngoscope Blade/Videolaryngoscope Blade Size:  3  ETT Size (mm):  7.0  Measured from:  Teeth  ETT to Teeth (cm):  22  Placement Verified by: auscultation and capnometry    Cormack-Lehane Classification:  Grade I - full view of glottis  Number of Attempts at Approach:  1

## 2019-05-13 NOTE — DIETARY
"Nutrition services: Day 1 of admit.  Ang Lancaster is a 84 y.o. female with admitting DX of chest pain and hip fracture.    Consult received for wt loss PTA.    Assessment:  Height: 162.6 cm (5' 4\")  Weight: 63.6 kg (140 lb 5.2 oz)  Body mass index is 24.09 kg/m² - WNL  Diet/Intake: NPO x1 day    Evaluation:   1. Pt visited for interview today, however, she was sleeping soundly in bed and did not rouse to my calls.  2. Per review of chart, pt experienced GLF with resultant hip pain and was found to have femoral neck fx. Plans for OR today.  3. Wt records from prior admit show wt was 150# on 4/25/18 and wt was 140# on 5/12/18 (both measurements by wheelchair scale) - note 6.66% wt loss over unclear timeframe.  4. Will establish POC to monitor for diet resumption after OR and for adequate PO intake. RD to follow for dietary interventions as appropriate.  5. Labs: reviewed  6. Meds/fluids: LR @ 100mL/hr    Malnutrition Risk: At risk 2' wt loss of 6.66% over unclear time period (from 4/2018 through current), however, unable to meet criteria at this time.    Recommendations/Plan:  1. Resumption of PO diet when medically feasible.   2. Encourage intake of meals  3. Document intake of all meals as % taken in ADL's to provide interdisciplinary communication across all shifts.   4. Monitor weight.  5. Nutrition rep will continue to see patient for ongoing meal and snack preferences.     RD monitoring.        "

## 2019-05-13 NOTE — CARE PLAN
Problem: Communication  Goal: The ability to communicate needs accurately and effectively will improve  Outcome: PROGRESSING AS EXPECTED      Problem: Safety  Goal: Will remain free from injury  Outcome: PROGRESSING AS EXPECTED    Goal: Will remain free from falls  Outcome: PROGRESSING AS EXPECTED      Problem: Bowel/Gastric:  Goal: Normal bowel function is maintained or improved  Outcome: PROGRESSING AS EXPECTED

## 2019-05-14 LAB
APPEARANCE UR: CLEAR
BACTERIA #/AREA URNS HPF: ABNORMAL /HPF
BILIRUB UR QL STRIP.AUTO: NEGATIVE
COLOR UR: YELLOW
EPI CELLS #/AREA URNS HPF: ABNORMAL /HPF
GLUCOSE UR STRIP.AUTO-MCNC: NEGATIVE MG/DL
HYALINE CASTS #/AREA URNS LPF: ABNORMAL /LPF
KETONES UR STRIP.AUTO-MCNC: 40 MG/DL
LEUKOCYTE ESTERASE UR QL STRIP.AUTO: ABNORMAL
MICRO URNS: ABNORMAL
MUCOUS THREADS #/AREA URNS HPF: ABNORMAL /HPF
NITRITE UR QL STRIP.AUTO: NEGATIVE
PH UR STRIP.AUTO: 6 [PH]
PROT UR QL STRIP: NEGATIVE MG/DL
RBC # URNS HPF: ABNORMAL /HPF
RBC UR QL AUTO: ABNORMAL
SP GR UR STRIP.AUTO: 1.02
WBC #/AREA URNS HPF: ABNORMAL /HPF

## 2019-05-14 PROCEDURE — 97535 SELF CARE MNGMENT TRAINING: CPT

## 2019-05-14 PROCEDURE — 700105 HCHG RX REV CODE 258: Performed by: HOSPITALIST

## 2019-05-14 PROCEDURE — A9270 NON-COVERED ITEM OR SERVICE: HCPCS | Performed by: INTERNAL MEDICINE

## 2019-05-14 PROCEDURE — 700111 HCHG RX REV CODE 636 W/ 250 OVERRIDE (IP): Performed by: ORTHOPAEDIC SURGERY

## 2019-05-14 PROCEDURE — A9270 NON-COVERED ITEM OR SERVICE: HCPCS | Performed by: HOSPITALIST

## 2019-05-14 PROCEDURE — 700102 HCHG RX REV CODE 250 W/ 637 OVERRIDE(OP): Performed by: INTERNAL MEDICINE

## 2019-05-14 PROCEDURE — 99232 SBSQ HOSP IP/OBS MODERATE 35: CPT | Performed by: HOSPITALIST

## 2019-05-14 PROCEDURE — 700105 HCHG RX REV CODE 258: Performed by: ORTHOPAEDIC SURGERY

## 2019-05-14 PROCEDURE — 700102 HCHG RX REV CODE 250 W/ 637 OVERRIDE(OP): Performed by: ORTHOPAEDIC SURGERY

## 2019-05-14 PROCEDURE — 700112 HCHG RX REV CODE 229: Performed by: ORTHOPAEDIC SURGERY

## 2019-05-14 PROCEDURE — A9270 NON-COVERED ITEM OR SERVICE: HCPCS | Performed by: ORTHOPAEDIC SURGERY

## 2019-05-14 PROCEDURE — 81001 URINALYSIS AUTO W/SCOPE: CPT

## 2019-05-14 PROCEDURE — 770006 HCHG ROOM/CARE - MED/SURG/GYN SEMI*

## 2019-05-14 PROCEDURE — 97165 OT EVAL LOW COMPLEX 30 MIN: CPT

## 2019-05-14 PROCEDURE — 700111 HCHG RX REV CODE 636 W/ 250 OVERRIDE (IP): Performed by: HOSPITALIST

## 2019-05-14 PROCEDURE — 700102 HCHG RX REV CODE 250 W/ 637 OVERRIDE(OP): Performed by: HOSPITALIST

## 2019-05-14 PROCEDURE — 97162 PT EVAL MOD COMPLEX 30 MIN: CPT

## 2019-05-14 RX ORDER — HALOPERIDOL 5 MG/ML
2 INJECTION INTRAMUSCULAR ONCE
Status: COMPLETED | OUTPATIENT
Start: 2019-05-14 | End: 2019-05-14

## 2019-05-14 RX ORDER — QUETIAPINE FUMARATE 25 MG/1
50 TABLET, FILM COATED ORAL EVERY EVENING
Status: DISCONTINUED | OUTPATIENT
Start: 2019-05-14 | End: 2019-05-16 | Stop reason: HOSPADM

## 2019-05-14 RX ADMIN — SODIUM CHLORIDE, POTASSIUM CHLORIDE, SODIUM LACTATE AND CALCIUM CHLORIDE: 600; 310; 30; 20 INJECTION, SOLUTION INTRAVENOUS at 09:01

## 2019-05-14 RX ADMIN — HALOPERIDOL LACTATE 2 MG: 5 INJECTION, SOLUTION INTRAMUSCULAR at 00:45

## 2019-05-14 RX ADMIN — KETOROLAC TROMETHAMINE 15 MG: 30 INJECTION, SOLUTION INTRAMUSCULAR; INTRAVENOUS at 20:12

## 2019-05-14 RX ADMIN — DOCUSATE SODIUM 100 MG: 100 CAPSULE, LIQUID FILLED ORAL at 17:15

## 2019-05-14 RX ADMIN — ENOXAPARIN SODIUM 40 MG: 100 INJECTION SUBCUTANEOUS at 04:45

## 2019-05-14 RX ADMIN — KETOROLAC TROMETHAMINE 15 MG: 30 INJECTION, SOLUTION INTRAMUSCULAR; INTRAVENOUS at 09:01

## 2019-05-14 RX ADMIN — SODIUM CHLORIDE, POTASSIUM CHLORIDE, SODIUM LACTATE AND CALCIUM CHLORIDE: 600; 310; 30; 20 INJECTION, SOLUTION INTRAVENOUS at 00:25

## 2019-05-14 RX ADMIN — FAMOTIDINE 20 MG: 20 TABLET ORAL at 05:16

## 2019-05-14 RX ADMIN — SODIUM CHLORIDE 2 G: 9 INJECTION, SOLUTION INTRAVENOUS at 00:00

## 2019-05-14 RX ADMIN — ACETAMINOPHEN 650 MG: 325 TABLET, FILM COATED ORAL at 05:16

## 2019-05-14 RX ADMIN — DOCUSATE SODIUM 100 MG: 100 CAPSULE, LIQUID FILLED ORAL at 05:16

## 2019-05-14 RX ADMIN — ACETAMINOPHEN 650 MG: 325 TABLET, FILM COATED ORAL at 00:00

## 2019-05-14 RX ADMIN — SODIUM CHLORIDE, POTASSIUM CHLORIDE, SODIUM LACTATE AND CALCIUM CHLORIDE: 600; 310; 30; 20 INJECTION, SOLUTION INTRAVENOUS at 20:12

## 2019-05-14 RX ADMIN — DIAZEPAM 2.5 MG: 5 TABLET ORAL at 05:16

## 2019-05-14 RX ADMIN — KETOROLAC TROMETHAMINE 15 MG: 30 INJECTION, SOLUTION INTRAMUSCULAR; INTRAVENOUS at 01:45

## 2019-05-14 RX ADMIN — SODIUM CHLORIDE 2 G: 9 INJECTION, SOLUTION INTRAVENOUS at 08:56

## 2019-05-14 ASSESSMENT — ENCOUNTER SYMPTOMS
VOMITING: 0
TINGLING: 0
PALPITATIONS: 0
SHORTNESS OF BREATH: 0
NAUSEA: 0
CHILLS: 0
NECK PAIN: 0
EYE PAIN: 0
BACK PAIN: 0
BLURRED VISION: 0
COUGH: 0
DIZZINESS: 0
SORE THROAT: 0
ABDOMINAL PAIN: 0
INSOMNIA: 0
FEVER: 0
DEPRESSION: 0
HEADACHES: 0

## 2019-05-14 ASSESSMENT — COGNITIVE AND FUNCTIONAL STATUS - GENERAL
WALKING IN HOSPITAL ROOM: A LOT
SUGGESTED CMS G CODE MODIFIER MOBILITY: CM
EATING MEALS: A LITTLE
TOILETING: TOTAL
HELP NEEDED FOR BATHING: A LOT
CLIMB 3 TO 5 STEPS WITH RAILING: A LOT
MOVING TO AND FROM BED TO CHAIR: UNABLE
MOVING FROM LYING ON BACK TO SITTING ON SIDE OF FLAT BED: UNABLE
STANDING UP FROM CHAIR USING ARMS: A LOT
PERSONAL GROOMING: A LITTLE
TURNING FROM BACK TO SIDE WHILE IN FLAT BAD: UNABLE
DRESSING REGULAR LOWER BODY CLOTHING: A LOT
MOBILITY SCORE: 9
DRESSING REGULAR UPPER BODY CLOTHING: A LITTLE
SUGGESTED CMS G CODE MODIFIER DAILY ACTIVITY: CK
DAILY ACTIVITIY SCORE: 14

## 2019-05-14 ASSESSMENT — ACTIVITIES OF DAILY LIVING (ADL): TOILETING: UNABLE TO DETERMINE AT THIS TIME

## 2019-05-14 ASSESSMENT — GAIT ASSESSMENTS
DISTANCE (FEET): 5
ASSISTIVE DEVICE: FRONT WHEEL WALKER
DEVIATION: STEP TO;ANTALGIC;DECREASED BASE OF SUPPORT
GAIT LEVEL OF ASSIST: MODERATE ASSIST

## 2019-05-14 NOTE — PROGRESS NOTES
"Pt continues to try and get out of bed. Continues to talk to unseen others. Pulled iv out. Another placed in right arm-see flowsheet. Refused scd's. Alert and oriented x1. Remains agitated, confused and irritable.pt states \"wants out of restraints and needs help taking her trash to the curb to be picked up. Will continue to monitor pt status.  "

## 2019-05-14 NOTE — ANESTHESIA QCDR
2019 [facility] Clinical Data Registry (for Quality Improvement)     Postoperative nausea/vomiting risk protocol (Adult = 18 yrs and Pediatric 3-17 yrs)- (430 and 463)  General inhalation anesthetic (NOT TIVA) with PONV risk factors: No  Provision of anti-emetic therapy with at least 2 different classes of agents: N/A  Patient DID NOT receive anti-emetic therapy and reason is documented in Medical Record: N/A    Multimodal Pain Management- (AQI59)  Patient undergoing Elective Surgery (i.e. Outpatient, or ASC, or Prescheduled Surgery prior to Hospital Admission): No  Use of Multimodal Pain Management, two or more drugs and/or interventions, NOT including systemic opioids: N/A  Exception: Documented allergy to multiple classes of analgesics: N/A    PACU assessment of acute postoperative pain prior to Anesthesia Care End- Applies to Patients Age = 18- (ABG7)  Initial PACU pain score is which of the following: < 7/10  Patient unable to report pain score: N/A    Post-anesthetic transfer of care checklist/protocol to PACU/ICU- (426 and 427)  Upon conclusion of case, patient transferred to which of the following locations: PACU/Non-ICU  Use of transfer checklist/protocol: Yes  Exclusion: Service Performed in Patient Hospital Room (and thus did not require transfer): N/A    PACU Reintubation- (AQI31)  General anesthesia requiring endotracheal intubation (ETT) along with subsequent extubation in OR or PACU: Yes  Required reintubation in the PACU: No   Extubation was a planned trial documented in the medical record prior to removal of the original airway device:  N/A    Unplanned admission to ICU related to anesthesia service up through end of PACU care- (MD51)  Unplanned admission to ICU (not initially anticipated at anesthesia start time): No

## 2019-05-14 NOTE — THERAPY
"Occupational Therapy Evaluation completed.   Functional Status:  85 yo female s/p hip fracture, with Pinning on 5/13/19.  Pt is WBAT RLE.  Unclear PLOF, pt reports she lives alone and was doing all her own driving, cooking, cleaning etc but then reported \"daughter comes by a lot\" and \"cooking is mostly TV dinners\" so question pt's accuracy as a historian.  Pt agreeable to OT eval.  She was already seated in the chair, and able to groom seated with Conchita (face, hair teeth, hands), change gown with Conchita, and stood with Conchita from chair for linen change undeneath her.  Pt asking appropriately for something to moisturize her lips, and asking for a neck pillow.  Pt provided with chapstick and a rolled warm blanket for her neck.  She was quite pleasant during OT eval.  She was fidgety with gown and a couple items at times, but was easily able to redirect.  Pt tolerated early mobility well, states that she has \"no pain\" in standing or during the rest of session.  Pt will benefit from further inpt transtional care therapy prior to home, given she needs to recover from hip fracture, and increase safety with mobility and ADL's.  Will follow while she is in house.  Plan of Care: Will benefit from Occupational Therapy 3 times per week  Discharge Recommendations:  Equipment: Will Continue to Assess for Equipment Needs. Post-acute therapy Discharge to a transitional care facility for continued skilled therapy services.    See \"Rehab Therapy-Acute\" Patient Summary Report for complete documentation.    "

## 2019-05-14 NOTE — DISCHARGE PLANNING
Anticipated Discharge Disposition: SNF    Action: Received call back from pt's dtr Anahi, she would like her mother to go to SNF in the [facility] area. Per dtr, she does not want [facility] and she would like referral sent to [facility]. Faxed updated choice form to [facility] to process SNF referral.     Barriers to Discharge: Pt will need to stay out of restraints for 24 hours.     Plan: As above, [facility] to send SNF referral to [facility] per dtr's request. Await acceptance to SNF and medical clearance. Pt will need to be out of restraints for 24 hours prior to SNF transfer.

## 2019-05-14 NOTE — DISCHARGE PLANNING
Agency/Facility Name: [facility]  Spoke To: Thais  Outcome: Patient is accepted as long as she stays off restraints and when a bed is available, which could be a week or two.

## 2019-05-14 NOTE — OP REPORT
DATE OF SERVICE:  05/13/2019    PREOPERATIVE DIAGNOSIS:  Right femoral neck fracture.    POSTOPERATIVE DIAGNOSIS:  Right femoral neck fracture.    PROCEDURE PERFORMED:  Closed reduction and percutaneous pin fixation, right   femoral neck fracture.    SURGEON:  Oleg Wilhelm MD    ASSISTANT:  Rene Carter PA-C    ESTIMATED BLOOD LOSS:  None.    INDICATIONS:  This is an 84-year-old female who is status post fall who   sustained a right femoral neck fracture.  Risks and benefits of closed   reduction percutaneous pinning were discussed, which include but not limited   to bleeding, infection, neurovascular damage, pain, stiffness, malunion,   nonunion, DVT, PE, MI, stroke, and death.  They understand all these risks and   wished to proceed.    DESCRIPTION OF PROCEDURE:  The patient was sedated with LMA anesthesia and   administered preoperative antibiotics.  The patient was placed on a fracture   table and the fracture was reduced with slight traction and internal rotation.    Right hip was prepped and draped in the usual sterile fashion after   perioperative antibiotics were administered and 3 guide pins for OIC 7.3   cannulated screws were inserted in an inverted triangular fashion.  Screws of   appropriate length were chosen and placed.  All screws were checked and found   to be of appropriate length and out of the joint.  Reduction was found to be   anatomic.  Wounds were irrigated, closed with staples.  Sterile dressing was   applied.  The patient tolerated the procedure well.    POSTOPERATIVE PLAN:  The patient will be admitted under medicine service, on   perioperative antibiotics, DVT prophylaxis, and pain control.       ____________________________________     OLEG WILHELM MD    KELLY / NTS    DD:  05/13/2019 17:07:40  DT:  05/13/2019 17:44:45    D#:  [id number]  Job#:  [id number]

## 2019-05-14 NOTE — PROGRESS NOTES
Pt reports she can not void, restless . retaining urine ,scanned 700 ml. Delatorre cath 14fr. inserted as ordered. 730ml out clear stefany urine with cath. Pt reports she feels more comfortable at this time. Delatorre will remain until further orders.

## 2019-05-14 NOTE — CARE PLAN
Problem: Communication  Goal: The ability to communicate needs accurately and effectively will improve  Outcome: PROGRESSING SLOWER THAN EXPECTED      Problem: Safety  Goal: Will remain free from injury  Outcome: PROGRESSING SLOWER THAN EXPECTED    Goal: Will remain free from falls  Outcome: PROGRESSING SLOWER THAN EXPECTED      Problem: Infection  Goal: Will remain free from infection  Outcome: PROGRESSING AS EXPECTED      Problem: Knowledge Deficit  Goal: Knowledge of the prescribed therapeutic regimen will improve  Outcome: PROGRESSING SLOWER THAN EXPECTED

## 2019-05-14 NOTE — PROGRESS NOTES
Iv haldol ineffective. Pt remains agitated and resisting care. Doctor notified for restraints order.

## 2019-05-14 NOTE — ASSESSMENT & PLAN NOTE
Was worse over night. She has a history of sundowning in the past. Add seroquel at night and use prn haldol. Not needing restraints at present.

## 2019-05-14 NOTE — PROGRESS NOTES
Arrived on shift with px in soft wrist restraints. Px agitated, verbally abusive, trying to get out of restraints. Px refused breakfast. Daughter came to visit while PT was working with px, PT got px up to chair with minimal assistance. Px has been doing well in the chair, calm, not pulling at lines, no complaints of pain. Restraints removed around 1300. Delatorre removed per MD order. IVF infusing per order. Px worked with OT, sitting up in chair watching TV, chair alarm on. Education provided, will continue to monitor.

## 2019-05-14 NOTE — DISCHARGE PLANNING
Anticipated Discharge Disposition: SNF    Action: [facility] updated that [facility] does not anticipate a bed opening up for 1-2 weeks. LSW discussed pt's case with bedside RN, pt has been off of restraints since approx. 12pm this afternoon. LSW called pt's dtr Anahi to discuss another SNF choice and left a message requesting a call back.     Barriers to Discharge: Pt will need to stay out of restraints for 24 hours prior to transfer to SNF.    Plan: As above, [facility] does not currently have a bed and do not anticipate a bed for another 1-2 weeks. Left message for princess Christian to discuss another SNF choice.

## 2019-05-14 NOTE — DISCHARGE PLANNING
Anticipated Discharge Disposition: SNF    Action: Received order for SNF, attempted to meet with pt at bedside but pt is currently confused. LSW called pt's dtr Anahi #[phone] to call back to discuss pt's case and d/c planning.     Barriers to Discharge: N/A.     Plan: As above, await a call back from pt's dtr to discuss SNF.

## 2019-05-14 NOTE — PROGRESS NOTES
Hospital Medicine Daily Progress Note    Date of Service  5/14/2019    Chief Complaint  Fall, hip pain    Hospital Course    *84-year-old female had mechanical ground-level fall and presented with right hip pain x-rays in the ER showed right proximal femoral impacted subcapital fracture. She underwent a pinning with orthopedics. Her postoperative course was complicated by delirium. *      Interval Problem Update  Alert, pleasant and out of restraints during the day today. I discussed her plan with her family at the bedside. The patient says she is feeling well. I stopped her rendon. I added seroquel for agitation at night.     Consultants/Specialty  Orthopedics    Code Status  DNR    Disposition  skilled nursing- cleared to go    Review of Systems  Review of Systems   Constitutional: Negative for chills and fever.   HENT: Negative for sore throat.    Eyes: Negative for blurred vision and pain.   Respiratory: Negative for cough and shortness of breath.    Cardiovascular: Negative for chest pain and palpitations.   Gastrointestinal: Negative for abdominal pain, nausea and vomiting.   Genitourinary: Negative for dysuria and urgency.   Musculoskeletal: Negative for back pain and neck pain.   Skin: Negative for itching and rash.   Neurological: Negative for dizziness, tingling and headaches.   Psychiatric/Behavioral: Negative for depression. The patient does not have insomnia.    All other systems reviewed and are negative.       Physical Exam  Temp:  [36.9 °C (98.4 °F)-38.2 °C (100.8 °F)] 37.3 °C (99.1 °F)  Pulse:  [79-99] 99  Resp:  [16-24] 16  BP: (102-132)/(46-59) 104/53  SpO2:  [90 %-99 %] 98 %    Physical Exam   Constitutional: She is oriented to person, place, and time. She appears well-developed and well-nourished. No distress.   Patient seen and examined  Discussed plan with RN   HENT:   Right Ear: External ear normal.   Left Ear: External ear normal.   Nose: Nose normal.   Eyes: Conjunctivae are normal. Right  eye exhibits no discharge. Left eye exhibits no discharge.   Neck: No JVD present.   Cardiovascular: Regular rhythm and normal heart sounds.    No murmur heard.  Cap refill 2sec  Pulses 2+ throughout     Pulmonary/Chest: Effort normal and breath sounds normal. No stridor. No respiratory distress. She has no wheezes. She has no rales.   Abdominal: Soft. Bowel sounds are normal. She exhibits no distension. There is no tenderness.   Musculoskeletal: She exhibits tenderness. She exhibits no edema.   Neurological: She is alert and oriented to person, place, and time.   Confused on occasion.    Skin: Skin is warm and dry. She is not diaphoretic. No erythema.   Normal skin  Color.    Psychiatric: She has a normal mood and affect. Her behavior is normal.   Nursing note and vitals reviewed.      Fluids    Intake/Output Summary (Last 24 hours) at 05/14/19 0729  Last data filed at 05/14/19 0300   Gross per 24 hour   Intake             2520 ml   Output             1630 ml   Net              890 ml       Laboratory  Recent Labs      05/12/19   1448  05/13/19   0515   WBC  11.7*  10.4   RBC  4.07*  4.10*   HEMOGLOBIN  12.9  13.1   HEMATOCRIT  38.8  38.4   MCV  95.3  93.7   MCH  31.7  32.0   MCHC  33.2*  34.1   RDW  45.1  43.5   PLATELETCT  183  206   MPV  10.4  10.7     Recent Labs      05/12/19   1448  05/13/19   0515   SODIUM  135  134*   POTASSIUM  4.0  3.6   CHLORIDE  103  103   CO2  25  22   GLUCOSE  101*  129*   BUN  20  15   CREATININE  0.87  0.84   CALCIUM  8.9  8.7     Recent Labs      05/12/19   1448   APTT  29.4   INR  0.98               Imaging  DX-HIP-COMPLETE - UNILATERAL 2+ RIGHT   Final Result      Screw fixation of subcapital femoral neck fracture      DX-CHEST-PORTABLE (1 VIEW)   Final Result      No definite evidence of acute cardiac pulmonary disease.      Lucency along the lateral right lung likely due to skin fold as opposed to pneumothorax.      DX-PELVIS-1 OR 2 VIEWS   Final Result      Right proximal  femoral impacted subcapital fracture.      DX-HIP-UNILATERAL-WITH PELVIS-1 VIEW RIGHT   Final Result      1.  Right proximal femoral impacted subcapital fracture.      2.  Surgical change involving the left hip with degenerative change.      DX-PORTABLE FLUOROSCOPY < 1 HOUR    (Results Pending)        Assessment/Plan  * Closed right hip fracture (HCC)   Assessment & Plan    S/p orif  Pain control  Dvt proph  Pt/ot and placement.      Acute encephalopathy- (present on admission)   Assessment & Plan    Was worse over night. She has a history of sundowning in the past. Add seroquel at night and use prn haldol. Not needing restraints at present.      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

## 2019-05-14 NOTE — PROGRESS NOTES
Pt slept aprox 2 hours this shift. She remains unpredictable and continues to attempt to squirm out of soft restraints and bed. Redirected of unsafe behaviors. Med compliant. Iv patent. Restraints continued.circ checks complete.  Pt can not contract for safety. Will evaluate further as day goes on.

## 2019-05-14 NOTE — ANESTHESIA TIME REPORT
Anesthesia Start and Stop Event Times     Date Time Event    5/13/2019 1608 Anesthesia Start     1655 Anesthesia Stop        Responsible Staff  05/13/19    Name Role Begin End    Ole Paniagua Jr., M.D. Anesth 1608 1651        Preop Diagnosis (Free Text):  Pre-op Diagnosis     Right proximal femoral impacted subcapital fracture.        Preop Diagnosis (Codes):  Diagnosis Information     Diagnosis Code(s):         Post op Diagnosis  Hip fracture (HCC)      Premium Reason  A. 3PM - 7AM    Comments:

## 2019-05-14 NOTE — OR NURSING
1648: To PACU post right hip pinning. Pt is extubated. Requires chin lift. Strong DP pulse noted.  1718: No longer requires chin lift.  1720: Pt shivering, acknowledges that she is a little cold. Temp is 38.2.  1735: Pain/nausea free. VSS.

## 2019-05-14 NOTE — DISCHARGE PLANNING
Received Choice form at [phone]  Agency/Facility Name: [facility]  Referral sent per Choice form @ [phone]

## 2019-05-14 NOTE — PROGRESS NOTES
Pt has grown increasingly agitated, restless. Confused, talking to unseen others in room. She is pulling at iv lines,and rendon. benedryl 25mg iv given earlier in shift which was not effective. Pt paranoid, sitting up in bed, unable to redirect.docotor notified. Will try haldol 2mg iv to help relax pt.nurse at bedside continuously at this time to prevent pt from pulling out lines.

## 2019-05-14 NOTE — ANESTHESIA POSTPROCEDURE EVALUATION
Patient: Ang Lancaster    Procedure Summary     Date:  05/13/19 Room / Location:   OR 03 / SURGERY [facility]    Anesthesia Start:  1608 Anesthesia Stop:  1655    Procedure:  FIXATION, HIP, USING CANNULATED SCREW (Right Hip) Diagnosis:  (Right proximal femoral impacted subcapital fracture.)    Surgeon:  Oleg Corona M.D. Responsible Provider:  Ole Paniagua Jr., M.D.    Anesthesia Type:  general ASA Status:  3          Final Anesthesia Type: general  Last vitals  BP   Blood Pressure : 132/46, NIBP: 123/53    Temp   37.7 °C (99.9 °F)    Pulse   Pulse: 79, Heart Rate (Monitored): 77   Resp   (!) 24    SpO2   98 %      Anesthesia Post Evaluation    Patient location during evaluation: PACU  Patient participation: complete - patient cannot participate (at pre-op mental status)  Level of consciousness: confused, responsive to verbal stimuli, responsive to physical stimuli and responsive to painful stimuli  Pain score: 3    Airway patency: patent  Anesthetic complications: no  Cardiovascular status: adequate  Respiratory status: nasal cannula and acceptable  Hydration status: acceptable    PONV: none           Nurse Pain Score: 0 (NPRS)

## 2019-05-14 NOTE — THERAPY
"Physical Therapy Evaluation completed.     Bed Mobility:  Supine to Sit: Moderate Assist  Transfers: Sit to Stand: Minimal Assist  Gait: Level Of Assist: Moderate Assist with FWW    Plan of Care: continue PT 3x/wk until goals met for gait, therex, theractivity  Discharge Recommendations: Recommend inpatient transitional care services for continued physical therapy services.      83 yo female admitted after a GLF with subsequent R hip fracture requiring a hip pinning. She has a PMHx of a L hip fx 1 year ago for which she had surgery and spent time in a SNF after discharge. Per her daughter who was in the room at the time of the eval, the patient was confused and agitated after last year's surgery as well. She stated she remained confused until she went home.    The patient was in B wrist restraints on presentation and slumped into the bed; not agitated, but very confused and irritated with the lines. Once the O2 and wrist restraints were removed she became much less agitated. She requires extra time due to needing consistent verbal cues for sequencing and safety as she has no safety awareness. She was able to sit EOB for a lengthy time for an MD assessment and to have bites of her donut and coffee which also calmed her. Moderate verbal cues for sequencing walker and safety. However, once sitting up in the chair she was much more alert and less confused. Left her with her daughter, distraction with a newspaper and food. Her RN, Lindsay was notified that she is much more calm with distraction and food sitting up but that her daughter was not able to stay at the bedside. Her RN will sit nearby to document to allow her time out of her restraints. Pt will need a SNF after discharge in order to eventually discharge home safely.    Consuelo Adan, PT    See \"Rehab Therapy-Acute\" Patient Summary Report for complete documentation.     "

## 2019-05-14 NOTE — OR NURSING
Respirations easy. Gauze and Tegaderm clean and dry to operative site.  Palpable pedal pulses, feet warm, toes mobile and pink with brisk capillary refill. Patient denies pain and nausea, smiling and cooperative.  Oriented to self, re-orients to hospital and her falling. Doesn't know the month or year.

## 2019-05-14 NOTE — PROGRESS NOTES
marlyn arm soft Restraints applied to prevent pt from hurting herself and pulling lines out. Family notified of soft restraints and numerous failed attempts to prevent harm to self.

## 2019-05-14 NOTE — PROGRESS NOTES
Hospital Medicine Daily Progress Note    Date of Service  5/13/2019    Chief Complaint  Fall, hip pain    Hospital Course    *84-year-old female had mechanical ground-level fall and presented with right hip pain x-rays in the ER showed right proximal femoral impacted subcapital fracture.*      Interval Problem Update  Patient is nearly unresponsive and completely incoherent due to pain medication  Her oral mucosa is dry but she is mouth breathing    Consultants/Specialty  Orthopedics    Code Status  DNR    Disposition  Likely skilled nursing    Review of Systems  Review of Systems   Unable to perform ROS: Mental status change        Physical Exam  Temp:  [36.6 °C (97.9 °F)-38.2 °C (100.8 °F)] 36.9 °C (98.4 °F)  Pulse:  [72-79] 79  Resp:  [16-24] 16  BP: (102-134)/(46-69) 128/59  SpO2:  [90 %-99 %] 98 %    Physical Exam   Constitutional: She appears well-developed and well-nourished. No distress.   HENT:   Head: Normocephalic and atraumatic.   Right Ear: External ear normal.   Left Ear: External ear normal.   Oropharynx dry   Eyes: Conjunctivae are normal. Right eye exhibits no discharge. Left eye exhibits no discharge. No scleral icterus.   Staring into space   Neck: Neck supple.   Cardiovascular: Normal rate and regular rhythm.    Pulmonary/Chest: Effort normal and breath sounds normal.   Abdominal: Soft. Bowel sounds are normal. She exhibits no distension. There is tenderness (Lower abdomen). There is no rebound and no guarding.   Musculoskeletal: She exhibits no edema.   No visible swelling or bruising on right hip   Neurological: She is alert.   Incoherent   Skin: Skin is warm and dry. She is not diaphoretic.   Psychiatric:   Unable to determine   Nursing note and vitals reviewed.      Fluids    Intake/Output Summary (Last 24 hours) at 05/13/19 9659  Last data filed at 05/13/19 1908   Gross per 24 hour   Intake             2325 ml   Output             1150 ml   Net             1175 ml       Laboratory  Recent  Labs      05/12/19   1448  05/13/19   0515   WBC  11.7*  10.4   RBC  4.07*  4.10*   HEMOGLOBIN  12.9  13.1   HEMATOCRIT  38.8  38.4   MCV  95.3  93.7   MCH  31.7  32.0   MCHC  33.2*  34.1   RDW  45.1  43.5   PLATELETCT  183  206   MPV  10.4  10.7     Recent Labs      05/12/19   1448  05/13/19   0515   SODIUM  135  134*   POTASSIUM  4.0  3.6   CHLORIDE  103  103   CO2  25  22   GLUCOSE  101*  129*   BUN  20  15   CREATININE  0.87  0.84   CALCIUM  8.9  8.7     Recent Labs      05/12/19   1448   APTT  29.4   INR  0.98               Imaging  DX-HIP-COMPLETE - UNILATERAL 2+ RIGHT   Final Result      Screw fixation of subcapital femoral neck fracture      DX-CHEST-PORTABLE (1 VIEW)   Final Result      No definite evidence of acute cardiac pulmonary disease.      Lucency along the lateral right lung likely due to skin fold as opposed to pneumothorax.      DX-PELVIS-1 OR 2 VIEWS   Final Result      Right proximal femoral impacted subcapital fracture.      DX-HIP-UNILATERAL-WITH PELVIS-1 VIEW RIGHT   Final Result      1.  Right proximal femoral impacted subcapital fracture.      2.  Surgical change involving the left hip with degenerative change.      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

## 2019-05-14 NOTE — DISCHARGE PLANNING
Anticipated Discharge Disposition: SNF    Action: Discussed pt's case in MDT rounds, MD recommending SNF and states he discussed this with pt's dtr. LSW called pt's dtr Anahi again and was able to reach her and discuss SNF LOC. Per Anahi, her mother was most recently at [facility] and would like a referral sent there. Completed SNF choice and faxed to [facility] to process.     Barriers to Discharge: N/A.     Plan: As above, will await acceptance to [facility] and medical clearance.

## 2019-05-15 LAB
ALBUMIN SERPL BCP-MCNC: 2.5 G/DL (ref 3.2–4.9)
ALBUMIN/GLOB SERPL: 1 G/DL
ALP SERPL-CCNC: 37 U/L (ref 30–99)
ALT SERPL-CCNC: 20 U/L (ref 2–50)
ANION GAP SERPL CALC-SCNC: 5 MMOL/L (ref 0–11.9)
AST SERPL-CCNC: 29 U/L (ref 12–45)
BASOPHILS # BLD AUTO: 0.4 % (ref 0–1.8)
BASOPHILS # BLD: 0.03 K/UL (ref 0–0.12)
BILIRUB SERPL-MCNC: 0.8 MG/DL (ref 0.1–1.5)
BUN SERPL-MCNC: 14 MG/DL (ref 8–22)
CALCIUM SERPL-MCNC: 7.8 MG/DL (ref 8.4–10.2)
CHLORIDE SERPL-SCNC: 105 MMOL/L (ref 96–112)
CO2 SERPL-SCNC: 27 MMOL/L (ref 20–33)
CREAT SERPL-MCNC: 0.94 MG/DL (ref 0.5–1.4)
EOSINOPHIL # BLD AUTO: 0.18 K/UL (ref 0–0.51)
EOSINOPHIL NFR BLD: 2.6 % (ref 0–6.9)
ERYTHROCYTE [DISTWIDTH] IN BLOOD BY AUTOMATED COUNT: 44 FL (ref 35.9–50)
GLOBULIN SER CALC-MCNC: 2.5 G/DL (ref 1.9–3.5)
GLUCOSE SERPL-MCNC: 109 MG/DL (ref 65–99)
HCT VFR BLD AUTO: 34.2 % (ref 37–47)
HGB BLD-MCNC: 11.8 G/DL (ref 12–16)
IMM GRANULOCYTES # BLD AUTO: 0.03 K/UL (ref 0–0.11)
IMM GRANULOCYTES NFR BLD AUTO: 0.4 % (ref 0–0.9)
LYMPHOCYTES # BLD AUTO: 1.34 K/UL (ref 1–4.8)
LYMPHOCYTES NFR BLD: 19.2 % (ref 22–41)
MCH RBC QN AUTO: 32.2 PG (ref 27–33)
MCHC RBC AUTO-ENTMCNC: 34.5 G/DL (ref 33.6–35)
MCV RBC AUTO: 93.4 FL (ref 81.4–97.8)
MONOCYTES # BLD AUTO: 0.46 K/UL (ref 0–0.85)
MONOCYTES NFR BLD AUTO: 6.6 % (ref 0–13.4)
NEUTROPHILS # BLD AUTO: 4.93 K/UL (ref 2–7.15)
NEUTROPHILS NFR BLD: 70.8 % (ref 44–72)
NRBC # BLD AUTO: 0 K/UL
NRBC BLD-RTO: 0 /100 WBC
PLATELET # BLD AUTO: 162 K/UL (ref 164–446)
PMV BLD AUTO: 10 FL (ref 9–12.9)
POTASSIUM SERPL-SCNC: 3.5 MMOL/L (ref 3.6–5.5)
PROT SERPL-MCNC: 5 G/DL (ref 6–8.2)
RBC # BLD AUTO: 3.66 M/UL (ref 4.2–5.4)
SODIUM SERPL-SCNC: 137 MMOL/L (ref 135–145)
WBC # BLD AUTO: 7 K/UL (ref 4.8–10.8)

## 2019-05-15 PROCEDURE — 700102 HCHG RX REV CODE 250 W/ 637 OVERRIDE(OP): Performed by: ORTHOPAEDIC SURGERY

## 2019-05-15 PROCEDURE — A9270 NON-COVERED ITEM OR SERVICE: HCPCS | Performed by: ORTHOPAEDIC SURGERY

## 2019-05-15 PROCEDURE — 80053 COMPREHEN METABOLIC PANEL: CPT

## 2019-05-15 PROCEDURE — 700105 HCHG RX REV CODE 258: Performed by: HOSPITALIST

## 2019-05-15 PROCEDURE — 700102 HCHG RX REV CODE 250 W/ 637 OVERRIDE(OP): Performed by: HOSPITALIST

## 2019-05-15 PROCEDURE — 700111 HCHG RX REV CODE 636 W/ 250 OVERRIDE (IP): Performed by: ORTHOPAEDIC SURGERY

## 2019-05-15 PROCEDURE — 700112 HCHG RX REV CODE 229: Performed by: ORTHOPAEDIC SURGERY

## 2019-05-15 PROCEDURE — 97116 GAIT TRAINING THERAPY: CPT

## 2019-05-15 PROCEDURE — 770006 HCHG ROOM/CARE - MED/SURG/GYN SEMI*

## 2019-05-15 PROCEDURE — 85025 COMPLETE CBC W/AUTO DIFF WBC: CPT

## 2019-05-15 PROCEDURE — 99232 SBSQ HOSP IP/OBS MODERATE 35: CPT | Performed by: HOSPITALIST

## 2019-05-15 PROCEDURE — A9270 NON-COVERED ITEM OR SERVICE: HCPCS | Performed by: HOSPITALIST

## 2019-05-15 PROCEDURE — 700111 HCHG RX REV CODE 636 W/ 250 OVERRIDE (IP): Performed by: HOSPITALIST

## 2019-05-15 PROCEDURE — 36415 COLL VENOUS BLD VENIPUNCTURE: CPT

## 2019-05-15 RX ORDER — POTASSIUM CHLORIDE 20 MEQ/1
40 TABLET, EXTENDED RELEASE ORAL ONCE
Status: COMPLETED | OUTPATIENT
Start: 2019-05-15 | End: 2019-05-15

## 2019-05-15 RX ADMIN — ACETAMINOPHEN 650 MG: 325 TABLET, FILM COATED ORAL at 06:15

## 2019-05-15 RX ADMIN — KETOROLAC TROMETHAMINE 15 MG: 30 INJECTION, SOLUTION INTRAMUSCULAR; INTRAVENOUS at 19:58

## 2019-05-15 RX ADMIN — ENOXAPARIN SODIUM 40 MG: 100 INJECTION SUBCUTANEOUS at 06:15

## 2019-05-15 RX ADMIN — ACETAMINOPHEN 650 MG: 325 TABLET, FILM COATED ORAL at 12:10

## 2019-05-15 RX ADMIN — FAMOTIDINE 20 MG: 20 TABLET ORAL at 06:15

## 2019-05-15 RX ADMIN — DOCUSATE SODIUM 100 MG: 100 CAPSULE, LIQUID FILLED ORAL at 06:15

## 2019-05-15 RX ADMIN — POTASSIUM CHLORIDE 40 MEQ: 1500 TABLET, EXTENDED RELEASE ORAL at 12:10

## 2019-05-15 RX ADMIN — CEFTRIAXONE 1 G: 1 INJECTION, POWDER, FOR SOLUTION INTRAMUSCULAR; INTRAVENOUS at 12:11

## 2019-05-15 ASSESSMENT — ENCOUNTER SYMPTOMS
CHILLS: 0
NECK PAIN: 0
SHORTNESS OF BREATH: 0
SORE THROAT: 0
COUGH: 0
PALPITATIONS: 0
INSOMNIA: 0
NAUSEA: 0
ABDOMINAL PAIN: 0
BLURRED VISION: 0
DEPRESSION: 0
TINGLING: 0
BACK PAIN: 0
FEVER: 0
EYE PAIN: 0
DIZZINESS: 0

## 2019-05-15 ASSESSMENT — GAIT ASSESSMENTS
GAIT LEVEL OF ASSIST: MINIMAL ASSIST
DEVIATION: ANTALGIC;STEP TO
ASSISTIVE DEVICE: FRONT WHEEL WALKER
DISTANCE (FEET): 30

## 2019-05-15 NOTE — DISCHARGE PLANNING
Received Choice form at [phone]  Agency/Facility Name: [facility]  Referral sent per Choice form @ [phone]

## 2019-05-15 NOTE — PROGRESS NOTES
Received report from day RN and assumed care of patient.  Patient resting in bed with eyes closed at that time.  Patient woke up later and ate 75% of her dinner.  She is alert and oriented to self and place.  She does try to get out of bed but is pleasant and easily redirectable.  IV patent and infusing.  Patient unsteady, but able to stand and pivot to the bedside commode.  Delatorre removed by day shift.  Patient voiding in bedside commode.  She is passing gas and bowel sounds are active.  She reports no pain but with movement she winces.  Dressing is CD&I.  Will continue to monitor.

## 2019-05-15 NOTE — DISCHARGE PLANNING
Anticipated Discharge Disposition: [facility]    Action: Patient discussed in afternoon rounds.  Per MD patient should be ready to discharge tomorrow morning. SW called and left a message for patient's DTR updating her on patient discharge plan.     Barriers to Discharge: patient being out of restraints for 24 hrs and medical clearance.     Plan: Sw will assist with discharge plans to [facility] for tomorrow morning.

## 2019-05-15 NOTE — CARE PLAN
Problem: Communication  Goal: The ability to communicate needs accurately and effectively will improve    Intervention: Royal patient and significant other/support system to call light to alert staff of needs  Patient reoriented to her room and hospital policy.      Problem: Safety  Goal: Will remain free from injury  Outcome: PROGRESSING SLOWER THAN EXPECTED  Patient encouraged to call before getting out of bed.  Bed alarm set.

## 2019-05-15 NOTE — CARE PLAN
Problem: Nutritional:  Goal: Achieve adequate nutritional intake  Diet advancement when medically feasible + PO intake >50%    Outcome: PROGRESSING AS EXPECTED  Limited recorded PO intake in flow sheets. Pt ate 75% of her dinner last night per nursing progress note.

## 2019-05-15 NOTE — PROGRESS NOTES
"Jessica has been ambulating to bathroom with walker,  standby assist. Was sitting up in the chair all morning then wanted to take a nap. Did not walk far with PT this morning but after her nap she ambulated about 400ft down the halls with standby assistance and walker. Does not complain of pain, only states her hip is sore and has been denying most of her medicine because she states it makes her \"crazy\". Currently sitting in chair, jessica does not remember the doctor or PT seeing her today but reoriented her and reassured her they both saw her at bedside today - px replied by saying \"you don't have to lie\". Urinating with no issues. IVF infusing per order. Chair alarm on. Will continue to monitor.   "

## 2019-05-15 NOTE — THERAPY
"Physical Therapy treatment completed.   Bed Mobility:  Supine to Sit:  (NT, pt sitting up in chair)  Transfers: Sit to Stand: Minimal Assist  Gait: Level Of Assist: Minimal Assist with Front-Wheel Walker x 30 ft      Plan of Care: Will benefit from Physical Therapy 3 times per week  Discharge Recommendations: Equipment: Will Continue to Assess for Equipment Needs. Post-acute therapy Discharge to a transitional care facility for continued skilled therapy services.    Pt with improved mobility today, following commands and able to participate with PT. Approp for SNF for further therapy and strengthening.    See \"Rehab Therapy-Acute\" Patient Summary Report for complete documentation.     "

## 2019-05-15 NOTE — PROGRESS NOTES
Hospital Medicine Daily Progress Note    Date of Service  5/15/2019    Chief Complaint  Fall, hip pain    Hospital Course    *84-year-old female had mechanical ground-level fall and presented with right hip pain x-rays in the ER showed right proximal femoral impacted subcapital fracture. She underwent a pinning with orthopedics. Her postoperative course was complicated by delirium and a UTI. She was treated with antibiotics. . *      Interval Problem Update  5/14- Alert, pleasant and out of restraints during the day today. I discussed her plan with her family at the bedside. The patient says she is feeling well. I stopped her rendon. I added seroquel for agitation at night.   5/15- I started antibiotics for her UTI. She is still having confusion. I replaced her K.       Consultants/Specialty  Orthopedics    Code Status  DNR    Disposition  skilled nursing- cleared to go. ?tomorrow in am.     Review of Systems  Review of Systems   Constitutional: Negative for chills and fever.   HENT: Negative for sore throat.    Eyes: Negative for blurred vision and pain.   Respiratory: Negative for cough and shortness of breath.    Cardiovascular: Negative for chest pain and palpitations.   Gastrointestinal: Negative for abdominal pain and nausea.   Genitourinary: Negative for dysuria and urgency.   Musculoskeletal: Negative for back pain and neck pain.   Skin: Negative for itching and rash.   Neurological: Negative for dizziness and tingling.   Psychiatric/Behavioral: Negative for depression. The patient does not have insomnia.    All other systems reviewed and are negative.       Physical Exam  Temp:  [36.5 °C (97.7 °F)-37.1 °C (98.8 °F)] 36.5 °C (97.7 °F)  Pulse:  [71-92] 71  Resp:  [16-18] 18  BP: (102-125)/(49-63) 109/59  SpO2:  [91 %-97 %] 97 %    Physical Exam   Constitutional: She is oriented to person, place, and time. She appears well-developed and well-nourished. No distress.   Patient seen and examined  Discussed plan  with RN   HENT:   Right Ear: External ear normal.   Left Ear: External ear normal.   Nose: Nose normal.   Eyes: Conjunctivae and EOM are normal. Right eye exhibits no discharge. Left eye exhibits no discharge.   Neck: No JVD present.   Cardiovascular: Regular rhythm and normal heart sounds.    No murmur heard.  Cap refill 2sec  Pulses 2+ throughout     Pulmonary/Chest: Effort normal and breath sounds normal. No stridor. No respiratory distress. She has no rales.   Abdominal: Soft. Bowel sounds are normal. She exhibits no distension. There is no tenderness.   Musculoskeletal: She exhibits tenderness. She exhibits no edema.   Neurological: She is alert and oriented to person, place, and time.   Confused on occasion.    Skin: Skin is warm and dry. She is not diaphoretic. No erythema.   Normal skin  Color.    Psychiatric: She has a normal mood and affect. Her behavior is normal.   Nursing note and vitals reviewed.      Fluids    Intake/Output Summary (Last 24 hours) at 05/15/19 1051  Last data filed at 05/15/19 1000   Gross per 24 hour   Intake             2000 ml   Output              620 ml   Net             1380 ml       Laboratory  Recent Labs      05/12/19   1448  05/13/19   0515  05/15/19   0611   WBC  11.7*  10.4  7.0   RBC  4.07*  4.10*  3.66*   HEMOGLOBIN  12.9  13.1  11.8*   HEMATOCRIT  38.8  38.4  34.2*   MCV  95.3  93.7  93.4   MCH  31.7  32.0  32.2   MCHC  33.2*  34.1  34.5   RDW  45.1  43.5  44.0   PLATELETCT  183  206  162*   MPV  10.4  10.7  10.0     Recent Labs      05/12/19   1448  05/13/19   0515  05/15/19   0611   SODIUM  135  134*  137   POTASSIUM  4.0  3.6  3.5*   CHLORIDE  103  103  105   CO2  25  22  27   GLUCOSE  101*  129*  109*   BUN  20  15  14   CREATININE  0.87  0.84  0.94   CALCIUM  8.9  8.7  7.8*     Recent Labs      05/12/19   1448   APTT  29.4   INR  0.98               Imaging  DX-PORTABLE FLUOROSCOPY < 1 HOUR   Final Result         Portable fluoroscopy utilized for 43.8 seconds.          DX-HIP-COMPLETE - UNILATERAL 2+ RIGHT   Final Result      Screw fixation of subcapital femoral neck fracture      DX-CHEST-PORTABLE (1 VIEW)   Final Result      No definite evidence of acute cardiac pulmonary disease.      Lucency along the lateral right lung likely due to skin fold as opposed to pneumothorax.      DX-PELVIS-1 OR 2 VIEWS   Final Result      Right proximal femoral impacted subcapital fracture.      DX-HIP-UNILATERAL-WITH PELVIS-1 VIEW RIGHT   Final Result      1.  Right proximal femoral impacted subcapital fracture.      2.  Surgical change involving the left hip with degenerative change.           Assessment/Plan  * Closed right hip fracture (HCC)   Assessment & Plan    S/p orif  Pain control  Dvt proph  Pt/ot and placement.      Acute encephalopathy- (present on admission)   Assessment & Plan    Was worse over night. She has a history of sundowning in the past. Add seroquel at night and use prn haldol. Not needing restraints at present.      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

## 2019-05-16 VITALS
BODY MASS INDEX: 23.96 KG/M2 | HEART RATE: 96 BPM | TEMPERATURE: 98.3 F | DIASTOLIC BLOOD PRESSURE: 65 MMHG | WEIGHT: 140.32 LBS | OXYGEN SATURATION: 91 % | HEIGHT: 64 IN | RESPIRATION RATE: 16 BRPM | SYSTOLIC BLOOD PRESSURE: 132 MMHG

## 2019-05-16 PROBLEM — S72.001A CLOSED RIGHT HIP FRACTURE (HCC): Status: RESOLVED | Noted: 2019-05-12 | Resolved: 2019-05-16

## 2019-05-16 PROBLEM — G93.40 ACUTE ENCEPHALOPATHY: Status: RESOLVED | Noted: 2018-04-26 | Resolved: 2019-05-16

## 2019-05-16 LAB
ANION GAP SERPL CALC-SCNC: 9 MMOL/L (ref 0–11.9)
BASOPHILS # BLD AUTO: 0.7 % (ref 0–1.8)
BASOPHILS # BLD: 0.05 K/UL (ref 0–0.12)
BUN SERPL-MCNC: 11 MG/DL (ref 8–22)
CALCIUM SERPL-MCNC: 7.8 MG/DL (ref 8.4–10.2)
CHLORIDE SERPL-SCNC: 104 MMOL/L (ref 96–112)
CO2 SERPL-SCNC: 25 MMOL/L (ref 20–33)
CREAT SERPL-MCNC: 0.73 MG/DL (ref 0.5–1.4)
EOSINOPHIL # BLD AUTO: 0.23 K/UL (ref 0–0.51)
EOSINOPHIL NFR BLD: 3.3 % (ref 0–6.9)
ERYTHROCYTE [DISTWIDTH] IN BLOOD BY AUTOMATED COUNT: 44.3 FL (ref 35.9–50)
GLUCOSE SERPL-MCNC: 110 MG/DL (ref 65–99)
HCT VFR BLD AUTO: 33.1 % (ref 37–47)
HGB BLD-MCNC: 11.4 G/DL (ref 12–16)
IMM GRANULOCYTES # BLD AUTO: 0.04 K/UL (ref 0–0.11)
IMM GRANULOCYTES NFR BLD AUTO: 0.6 % (ref 0–0.9)
LYMPHOCYTES # BLD AUTO: 1.14 K/UL (ref 1–4.8)
LYMPHOCYTES NFR BLD: 16.2 % (ref 22–41)
MCH RBC QN AUTO: 32.6 PG (ref 27–33)
MCHC RBC AUTO-ENTMCNC: 34.4 G/DL (ref 33.6–35)
MCV RBC AUTO: 94.6 FL (ref 81.4–97.8)
MONOCYTES # BLD AUTO: 0.46 K/UL (ref 0–0.85)
MONOCYTES NFR BLD AUTO: 6.5 % (ref 0–13.4)
NEUTROPHILS # BLD AUTO: 5.13 K/UL (ref 2–7.15)
NEUTROPHILS NFR BLD: 72.7 % (ref 44–72)
NRBC # BLD AUTO: 0 K/UL
NRBC BLD-RTO: 0 /100 WBC
PLATELET # BLD AUTO: 190 K/UL (ref 164–446)
PMV BLD AUTO: 10.5 FL (ref 9–12.9)
POTASSIUM SERPL-SCNC: 3.5 MMOL/L (ref 3.6–5.5)
RBC # BLD AUTO: 3.5 M/UL (ref 4.2–5.4)
SODIUM SERPL-SCNC: 138 MMOL/L (ref 135–145)
WBC # BLD AUTO: 7.1 K/UL (ref 4.8–10.8)

## 2019-05-16 PROCEDURE — 36415 COLL VENOUS BLD VENIPUNCTURE: CPT

## 2019-05-16 PROCEDURE — 700105 HCHG RX REV CODE 258: Performed by: HOSPITALIST

## 2019-05-16 PROCEDURE — 85025 COMPLETE CBC W/AUTO DIFF WBC: CPT

## 2019-05-16 PROCEDURE — 700111 HCHG RX REV CODE 636 W/ 250 OVERRIDE (IP): Performed by: ORTHOPAEDIC SURGERY

## 2019-05-16 PROCEDURE — 80048 BASIC METABOLIC PNL TOTAL CA: CPT

## 2019-05-16 PROCEDURE — 99239 HOSP IP/OBS DSCHRG MGMT >30: CPT | Performed by: INTERNAL MEDICINE

## 2019-05-16 RX ORDER — ONDANSETRON 4 MG/1
4 TABLET, ORALLY DISINTEGRATING ORAL EVERY 4 HOURS PRN
Qty: 10 TAB | Refills: 0 | Status: SHIPPED | OUTPATIENT
Start: 2019-05-16 | End: 2019-06-18

## 2019-05-16 RX ORDER — FAMOTIDINE 20 MG/1
20 TABLET, FILM COATED ORAL DAILY
Qty: 60 TAB | Refills: 1
Start: 2019-05-17 | End: 2019-06-18

## 2019-05-16 RX ORDER — QUETIAPINE FUMARATE 50 MG/1
50 TABLET, FILM COATED ORAL EVERY EVENING
Qty: 60 TAB | Refills: 3 | Status: SHIPPED | OUTPATIENT
Start: 2019-05-16 | End: 2019-06-18

## 2019-05-16 RX ORDER — PSEUDOEPHEDRINE HCL 30 MG
100 TABLET ORAL 2 TIMES DAILY
Qty: 60 CAP | Refills: 1
Start: 2019-05-16 | End: 2019-06-18

## 2019-05-16 RX ORDER — CEFDINIR 300 MG/1
300 CAPSULE ORAL 2 TIMES DAILY
Qty: 20 CAP | Refills: 0
Start: 2019-05-17 | End: 2019-05-19

## 2019-05-16 RX ORDER — ACETAMINOPHEN 325 MG/1
650 TABLET ORAL EVERY 6 HOURS PRN
Qty: 30 TAB | Refills: 0 | Status: SHIPPED | OUTPATIENT
Start: 2019-05-16 | End: 2019-06-13

## 2019-05-16 RX ORDER — OXYCODONE HYDROCHLORIDE 5 MG/1
2.5 TABLET ORAL
Qty: 10 TAB | Refills: 0 | Status: SHIPPED | OUTPATIENT
Start: 2019-05-16 | End: 2019-05-19

## 2019-05-16 RX ADMIN — ENOXAPARIN SODIUM 40 MG: 100 INJECTION SUBCUTANEOUS at 05:29

## 2019-05-16 RX ADMIN — SODIUM CHLORIDE, POTASSIUM CHLORIDE, SODIUM LACTATE AND CALCIUM CHLORIDE: 600; 310; 30; 20 INJECTION, SOLUTION INTRAVENOUS at 01:25

## 2019-05-16 RX ADMIN — KETOROLAC TROMETHAMINE 15 MG: 30 INJECTION, SOLUTION INTRAMUSCULAR; INTRAVENOUS at 01:26

## 2019-05-16 NOTE — PROGRESS NOTES
Received report from day RN and assumed care of patient.  Patient awake and oriented x 2 for self and place.  She is steady on her feet tonight although still impulsive.  She uses call light for other things but not to go to the bathroom.  Patient woke up later and ate 75% of her dinner.  Patient is pleasant and easily redirectable.  IV patent and infusing.  She is passing gas, bowel sounds are active and she had a BM today..  She reports no pain.  Dressing is CD&I.  Will continue to monitor

## 2019-05-16 NOTE — CARE PLAN
Problem: Safety  Goal: Will remain free from injury    Intervention: Provide assistance with mobility  Patient encouraged to call before getting out of bed.  Bed alarm set.      Problem: Knowledge Deficit  Goal: Knowledge of disease process/condition, treatment plan, diagnostic tests, and medications will improve    Intervention: Assess knowledge level of disease process/condition, treatment plan, diagnostic tests, and medications  Encouraged patient to take pain medications as needed and that the pain medications do not have to be narcotics.  It can be tylenol or Toradol.

## 2019-05-16 NOTE — PROGRESS NOTES
Report called to Albina at [facility].  Pt .transported with Vivek from [facility] via w/c.ID checked

## 2019-05-16 NOTE — DISCHARGE PLANNING
Discharge packet and completed COBRA given to bedside RN with POLST and prescription inside.  SW called and spoke with Anahi, patient's DTR and confirmed discharge plan, Anahi in agreement.

## 2019-05-16 NOTE — PROGRESS NOTES
Pt confused.wants to go home.explained to her she will be going to a SNF.  Dtr.called here and I updated her on the poc.  Ate brkfst.voiding w/o diff.  Pt dressed herserlf and is ready to leave.

## 2019-05-16 NOTE — DISCHARGE SUMMARY
Discharge Summary    CHIEF COMPLAINT ON ADMISSION  Chief Complaint   Patient presents with   • GLF   • Hip Injury     right       Reason for Admission  Rt Hip Pain     CODE STATUS  DNAR/DNI    HPI & HOSPITAL COURSE  This is a 84 y.o. female here with above medical issues. She sustained ground level fall. On admission she was found to have a  Right femoral neck fracture. Orthopedics was consulted, she underwent a closed reduction and percutaneous pin fixation. She tolerated this well. She suffered some post operative delirium and sun downing. She responded well to seroquel and had a UTI for which she was empirically placed on IV ceftriaxone. Urine cutlure is pending at this time,but her behavior improved. SNF will need to follow up on urine culture but she will be transitioned to oral omnicef. She will need lovenox for DVT px until ambulatory greater than 150 feet. She needs ongoing therapies.       Therefore, she is discharged in fair and stable condition to skilled nursing facility.    The patient met 2-midnight criteria for an inpatient stay at the time of discharge.      FOLLOW UP ITEMS POST DISCHARGE  F/U on urine culture.   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        11/9/11 SHE Is going to stop the eye injections and face injections    GERD (gastroesophageal reflux disease) POA: Yes  Resolved Problems:    Acute encephalopathy POA: Yes    Vertigo POA: Yes      FOLLOW UP  No future appointments.  No follow-up provider specified.    MEDICATIONS ON DISCHARGE     Medication List      START taking these medications      Instructions   acetaminophen 325 MG Tabs  Commonly known as:  TYLENOL   Take 2 Tabs by mouth every 6 hours as needed (Mild Pain; (Pain scale 1-3); Temp  greater than 100.5 F).  Dose:  650 mg     cefdinir 300 MG Caps  Start taking on:  5/17/2019  Commonly known as:  OMNICEF   Take 1 Cap by mouth 2 times a day for 2 days.  Dose:  300 mg     docusate sodium 100 MG Caps   Take 100 mg by mouth 2 Times a Day.  Dose:  100 mg     enoxaparin 40 MG/0.4ML Soln inj  Start taking on:  5/17/2019  Commonly known as:  LOVENOX   Inject 40 mg as instructed every day.  Dose:  40 mg     ondansetron 4 MG Tbdp  Commonly known as:  ZOFRAN ODT   Take 1 Tab by mouth every four hours as needed for Nausea (give PO if IV route is unavailable.).  Dose:  4 mg     oxyCODONE immediate-release 5 MG Tabs  Commonly known as:  ROXICODONE   Take 0.5 Tabs by mouth every 3 hours as needed for up to 3 days.  Dose:  2.5 mg     quetiapine 50 MG tablet  Commonly known as:  SEROQUEL   Take 1 Tab by mouth every evening.  Dose:  50 mg        CHANGE how you take these medications      Instructions   famotidine 20 MG Tabs  Start taking on:  5/17/2019  What changed:  · when to take this  · reasons to take this  Commonly known as:  PEPCID   Take 1 Tab by mouth every day.  Dose:  20 mg        CONTINUE taking these medications      Instructions   CHEWABLE CALCIUM PO   Take 1 Tab by mouth every day.  Dose:  1 Tab     diazePAM 5 MG Tabs  Commonly known as:  VALIUM   Take 1.25-2.5 mg by mouth 2 Times a Day. Pt takes 2.5MG in AM and 1.25MG sometimes at night  Dose:  1.25-2.5 mg        STOP taking these medications    ALEVE 220 MG tablet  Generic drug:  naproxen            Allergies  Allergies   Allergen Reactions   • Other Environmental Runny Nose and Itching     pollens       DIET  Orders Placed This Encounter   Procedures   • Diet Order Regular     Standing Status:   Standing     Number of Occurrences:   1     Order Specific Question:   Diet:     Answer:   Regular [1]       ACTIVITY  As tolerated and directed by skilled nursing.  Weight bearing as tolerated    LINES, DRAINS, AND WOUNDS  This is an automated list.  Peripheral IVs will be removed prior to discharge.                     MENTAL STATUS ON TRANSFER  Level of Consciousness: Alert  Orientation : Disoriented to Event, Disoriented to Place, Disoriented to Time  Speech: Speech Clear    CONSULTATIONS  Ortho    PROCEDURES  As above    DX-PORTABLE FLUOROSCOPY < 1 HOUR   Final Result         Portable fluoroscopy utilized for 43.8 seconds.         DX-HIP-COMPLETE - UNILATERAL 2+ RIGHT   Final Result      Screw fixation of subcapital femoral neck fracture      DX-CHEST-PORTABLE (1 VIEW)   Final Result      No definite evidence of acute cardiac pulmonary disease.      Lucency along the lateral right lung likely due to skin fold as opposed to pneumothorax.      DX-PELVIS-1 OR 2 VIEWS   Final Result      Right proximal femoral impacted subcapital fracture.      DX-HIP-UNILATERAL-WITH PELVIS-1 VIEW RIGHT   Final Result      1.  Right proximal femoral impacted subcapital fracture.      2.  Surgical change involving the left hip with degenerative change.            LABORATORY  Lab Results   Component Value Date    SODIUM 138 05/16/2019    POTASSIUM 3.5 (L) 05/16/2019    CHLORIDE 104 05/16/2019    CO2 25 05/16/2019    GLUCOSE 110 (H) 05/16/2019    BUN 11 05/16/2019    CREATININE 0.73 05/16/2019    CREATININE 0.88 11/28/2012    GLOMRATE >59 11/19/2010        Lab Results   Component Value Date    WBC 7.1 05/16/2019    WBC 5.5 06/08/2011    HEMOGLOBIN 11.4 (L) 05/16/2019    HEMATOCRIT 33.1 (L) 05/16/2019    PLATELETCT 190 05/16/2019        Total time of the discharge process exceeds 32 minutes.

## 2019-05-16 NOTE — PROGRESS NOTES
Patient accidentally removed IV and is refusing to have it reinserted.  Dr. Joyner notified and ok to leave IV out.

## 2019-05-16 NOTE — DISCHARGE PLANNING
Received Transport Form @ [id number]  Spoke to Norma @ [facility]    Transport is scheduled for 5/16 @1100 going to [facility].

## 2019-05-16 NOTE — PROGRESS NOTES
Patient became more and more unsteady throughout the night.  Patient decided that she was going home this morning and wanted to get dressed.  When told she needed to wait until the doctor came and told her that she was leaving to get dressed, she refused.  She repeatedly set the bed alarm off by getting out of bed.  Patient was taken to the bathroom to void and then back to bed, but still refused to sit down.  Breakfast tray set in front of patient and bed alarm on at this time.

## 2019-05-17 LAB — EKG IMPRESSION: NORMAL

## 2019-05-30 ENCOUNTER — TELEPHONE (OUTPATIENT)
Dept: URGENT CARE | Facility: MEDICAL CENTER | Age: 84
End: 2019-05-30

## 2019-05-30 NOTE — LETTER
June 4, 2019        Ang Lancaster  [address]  [place] [address]        Dear Ang:    Sandy Trent's office has tried contacting you. At your earliest convenience please contact our office at [phone].      If you have any questions or concerns, please don't hesitate to call.        Sincerely,        UMU MariaPBASIL.    Electronically Signed

## 2019-05-30 NOTE — TELEPHONE ENCOUNTER
We spoke to pt about this a while ago and she stated she did not want the brace- I will call to confirm-      Left message for patient to call back at [phone].

## 2019-05-30 NOTE — TELEPHONE ENCOUNTER
[facility] called for pt and needs the paperwork signed, filled out, and faxed back. Thank you!

## 2019-06-10 ENCOUNTER — HOME HEALTH ADMISSION (OUTPATIENT)
Dept: HOME HEALTH SERVICES | Facility: HOME HEALTHCARE | Age: 84
End: 2019-06-10
Payer: MEDICARE

## 2019-06-11 ENCOUNTER — TELEPHONE (OUTPATIENT)
Dept: URGENT CARE | Facility: MEDICAL CENTER | Age: 84
End: 2019-06-11

## 2019-06-11 ENCOUNTER — ANTICOAGULATION MONITORING (OUTPATIENT)
Dept: VASCULAR LAB | Facility: MEDICAL CENTER | Age: 84
End: 2019-06-11

## 2019-06-11 ENCOUNTER — HOME CARE VISIT (OUTPATIENT)
Dept: HOME HEALTH SERVICES | Facility: HOME HEALTHCARE | Age: 84
End: 2019-06-11
Payer: MEDICARE

## 2019-06-11 VITALS
RESPIRATION RATE: 18 BRPM | WEIGHT: 129.7 LBS | DIASTOLIC BLOOD PRESSURE: 60 MMHG | BODY MASS INDEX: 21.61 KG/M2 | OXYGEN SATURATION: 98 % | HEART RATE: 78 BPM | TEMPERATURE: 98.8 F | SYSTOLIC BLOOD PRESSURE: 110 MMHG | HEIGHT: 65 IN

## 2019-06-11 PROCEDURE — G0493 RN CARE EA 15 MIN HH/HOSPICE: HCPCS

## 2019-06-11 PROCEDURE — 665001 SOC-HOME HEALTH

## 2019-06-11 PROCEDURE — 665998 HH PPS REVENUE CREDIT

## 2019-06-11 PROCEDURE — 665999 HH PPS REVENUE DEBIT

## 2019-06-11 ASSESSMENT — PATIENT HEALTH QUESTIONNAIRE - PHQ9
5. POOR APPETITE OR OVEREATING: 0 - NOT AT ALL
1. LITTLE INTEREST OR PLEASURE IN DOING THINGS: 01
CLINICAL INTERPRETATION OF PHQ2 SCORE: 2
SUM OF ALL RESPONSES TO PHQ QUESTIONS 1-9: 2
2. FEELING DOWN, DEPRESSED, IRRITABLE, OR HOPELESS: 01

## 2019-06-11 ASSESSMENT — ACTIVITIES OF DAILY LIVING (ADL)
TRANSPORTATION COMMENTS: FATIGUES EASILY
OASIS_M1830: 03

## 2019-06-11 ASSESSMENT — ENCOUNTER SYMPTOMS
DIFFICULTY THINKING: 1
SHORTNESS OF BREATH: T
VOMITING: DENIES
POOR JUDGMENT: 1
NAUSEA: DENIES

## 2019-06-11 NOTE — TELEPHONE ENCOUNTER
Pt daughter called and wanted you to know she fell and broke her hip again. She is currently in a group home

## 2019-06-11 NOTE — PROGRESS NOTES
Medications were reviewed, looks like the patient is not taking anything currently.    He has multiple medications that are as needed medications for acid reflux and pain.  He also has quetiapine at night.  Was sent home on Lovenox 40 mg once a day for just 10 days.  The discharge medication list as listed below.    Not sure why patient is refusing/not taking his medication.  They will need to follow-up with primary care    Hubert Lowe M.S., Pharm.D., [facility], [facility]      MEDICATIONS ON DISCHARGE          Medication List           START taking these medications      Instructions   acetaminophen 325 MG Tabs  Commonly known as:  TYLENOL    Take 2 Tabs by mouth every 6 hours as needed (Mild Pain; (Pain scale 1-3); Temp greater than 100.5 F).  Dose:  650 mg      cefdinir 300 MG Caps  Start taking on:  5/17/2019  Commonly known as:  OMNICEF    Take 1 Cap by mouth 2 times a day for 2 days.  Dose:  300 mg      docusate sodium 100 MG Caps    Take 100 mg by mouth 2 Times a Day.  Dose:  100 mg      enoxaparin 40 MG/0.4ML Soln inj  Start taking on:  5/17/2019  Commonly known as:  LOVENOX    Inject 40 mg as instructed every day.  Dose:  40 mg      ondansetron 4 MG Tbdp  Commonly known as:  ZOFRAN ODT    Take 1 Tab by mouth every four hours as needed for Nausea (give PO if IV route is unavailable.).  Dose:  4 mg      oxyCODONE immediate-release 5 MG Tabs  Commonly known as:  ROXICODONE    Take 0.5 Tabs by mouth every 3 hours as needed for up to 3 days.  Dose:  2.5 mg      quetiapine 50 MG tablet  Commonly known as:  SEROQUEL    Take 1 Tab by mouth every evening.  Dose:  50 mg                  CHANGE how you take these medications      Instructions   famotidine 20 MG Tabs  Start taking on:  5/17/2019  What changed:  · when to take this  · reasons to take this  Commonly known as:  PEPCID    Take 1 Tab by mouth every day.  Dose:  20 mg              CONTINUE taking these medications      Instructions   CHEWABLE CALCIUM PO    Take 1  Tab by mouth every day.  Dose:  1 Tab      diazePAM 5 MG Tabs  Commonly known as:  VALIUM    Take 1.25-2.5 mg by mouth 2 Times a Day. Pt takes 2.5MG in AM and 1.25MG sometimes at night  Dose:  1.25-2.5 mg          STOP taking these medications    ALEVE 220 MG tablet  Generic drug:  naproxen

## 2019-06-12 ENCOUNTER — TELEPHONE (OUTPATIENT)
Dept: URGENT CARE | Facility: MEDICAL CENTER | Age: 84
End: 2019-06-12

## 2019-06-12 PROCEDURE — 665998 HH PPS REVENUE CREDIT

## 2019-06-12 PROCEDURE — 665999 HH PPS REVENUE DEBIT

## 2019-06-12 NOTE — TELEPHONE ENCOUNTER
[facility] called asking if brad can write a script for tylenol for her pain. Pt fell and broke her hip again. I can fax to them if order is written.     Ph.[phone]

## 2019-06-13 ENCOUNTER — HOME CARE VISIT (OUTPATIENT)
Dept: HOME HEALTH SERVICES | Facility: HOME HEALTHCARE | Age: 84
End: 2019-06-13
Payer: MEDICARE

## 2019-06-13 VITALS
HEART RATE: 74 BPM | RESPIRATION RATE: 18 BRPM | TEMPERATURE: 99.4 F | SYSTOLIC BLOOD PRESSURE: 128 MMHG | DIASTOLIC BLOOD PRESSURE: 72 MMHG | OXYGEN SATURATION: 99 %

## 2019-06-13 PROCEDURE — 665998 HH PPS REVENUE CREDIT

## 2019-06-13 PROCEDURE — G0151 HHCP-SERV OF PT,EA 15 MIN: HCPCS

## 2019-06-13 PROCEDURE — G0153 HHCP-SVS OF S/L PATH,EA 15MN: HCPCS

## 2019-06-13 PROCEDURE — 665999 HH PPS REVENUE DEBIT

## 2019-06-13 RX ORDER — ACETAMINOPHEN 325 MG/1
TABLET ORAL
Qty: 100 TAB | Refills: 1 | Status: SHIPPED | OUTPATIENT
Start: 2019-06-13 | End: 2019-06-18

## 2019-06-13 SDOH — ECONOMIC STABILITY: HOUSING INSECURITY: HOME SAFETY: PT LIVES IN A GROUP HOME. THE GROUP HOME PROVIDES FOR ADL'S AND IADL'S.

## 2019-06-13 ASSESSMENT — ENCOUNTER SYMPTOMS
DEBILITATING PAIN: 1
DIFFICULTY THINKING: 1

## 2019-06-13 ASSESSMENT — ACTIVITIES OF DAILY LIVING (ADL)
ADLS_COMMENTS: <!--EPICS-->SEE OT REPORT<!--EPICE-->
IADLS_COMMENTS: <!--EPICS-->SEE OT REPORT<!--EPICE-->

## 2019-06-14 ENCOUNTER — HOME CARE VISIT (OUTPATIENT)
Dept: HOME HEALTH SERVICES | Facility: HOME HEALTHCARE | Age: 84
End: 2019-06-14
Payer: MEDICARE

## 2019-06-14 VITALS
DIASTOLIC BLOOD PRESSURE: 45 MMHG | HEART RATE: 86 BPM | RESPIRATION RATE: 12 BRPM | OXYGEN SATURATION: 95 % | SYSTOLIC BLOOD PRESSURE: 100 MMHG | TEMPERATURE: 97.9 F

## 2019-06-14 PROCEDURE — G0495 RN CARE TRAIN/EDU IN HH: HCPCS

## 2019-06-14 PROCEDURE — 665999 HH PPS REVENUE DEBIT

## 2019-06-14 PROCEDURE — 665998 HH PPS REVENUE CREDIT

## 2019-06-14 ASSESSMENT — ENCOUNTER SYMPTOMS
POOR JUDGMENT: 1
SHORTNESS OF BREATH: T

## 2019-06-15 PROCEDURE — 665998 HH PPS REVENUE CREDIT

## 2019-06-15 PROCEDURE — 665999 HH PPS REVENUE DEBIT

## 2019-06-16 PROCEDURE — 665999 HH PPS REVENUE DEBIT

## 2019-06-16 PROCEDURE — 665998 HH PPS REVENUE CREDIT

## 2019-06-17 ENCOUNTER — HOME CARE VISIT (OUTPATIENT)
Dept: HOME HEALTH SERVICES | Facility: HOME HEALTHCARE | Age: 84
End: 2019-06-17
Payer: MEDICARE

## 2019-06-17 VITALS
SYSTOLIC BLOOD PRESSURE: 118 MMHG | BODY MASS INDEX: 21.54 KG/M2 | RESPIRATION RATE: 14 BRPM | DIASTOLIC BLOOD PRESSURE: 68 MMHG | WEIGHT: 129.44 LBS | TEMPERATURE: 98.9 F | OXYGEN SATURATION: 99 % | HEART RATE: 79 BPM

## 2019-06-17 PROCEDURE — 665999 HH PPS REVENUE DEBIT

## 2019-06-17 PROCEDURE — G0495 RN CARE TRAIN/EDU IN HH: HCPCS

## 2019-06-17 PROCEDURE — G0153 HHCP-SVS OF S/L PATH,EA 15MN: HCPCS

## 2019-06-17 PROCEDURE — 665998 HH PPS REVENUE CREDIT

## 2019-06-17 ASSESSMENT — ENCOUNTER SYMPTOMS: POOR JUDGMENT: 1

## 2019-06-18 ENCOUNTER — HOME CARE VISIT (OUTPATIENT)
Dept: HOME HEALTH SERVICES | Facility: HOME HEALTHCARE | Age: 84
End: 2019-06-18
Payer: MEDICARE

## 2019-06-18 VITALS
RESPIRATION RATE: 16 BRPM | SYSTOLIC BLOOD PRESSURE: 128 MMHG | DIASTOLIC BLOOD PRESSURE: 58 MMHG | TEMPERATURE: 99.7 F | HEART RATE: 79 BPM | OXYGEN SATURATION: 99 %

## 2019-06-18 VITALS
SYSTOLIC BLOOD PRESSURE: 122 MMHG | DIASTOLIC BLOOD PRESSURE: 60 MMHG | OXYGEN SATURATION: 98 % | RESPIRATION RATE: 16 BRPM | TEMPERATURE: 98.5 F | HEART RATE: 79 BPM

## 2019-06-18 PROCEDURE — G0152 HHCP-SERV OF OT,EA 15 MIN: HCPCS

## 2019-06-18 PROCEDURE — 665998 HH PPS REVENUE CREDIT

## 2019-06-18 PROCEDURE — 665999 HH PPS REVENUE DEBIT

## 2019-06-18 PROCEDURE — G0157 HHC PT ASSISTANT EA 15: HCPCS

## 2019-06-18 ASSESSMENT — ENCOUNTER SYMPTOMS: DIFFICULTY THINKING: 1

## 2019-06-19 ENCOUNTER — HOME CARE VISIT (OUTPATIENT)
Dept: HOME HEALTH SERVICES | Facility: HOME HEALTHCARE | Age: 84
End: 2019-06-19
Payer: MEDICARE

## 2019-06-19 VITALS
OXYGEN SATURATION: 98 % | TEMPERATURE: 98.5 F | DIASTOLIC BLOOD PRESSURE: 60 MMHG | RESPIRATION RATE: 16 BRPM | SYSTOLIC BLOOD PRESSURE: 122 MMHG | HEART RATE: 79 BPM

## 2019-06-19 VITALS
SYSTOLIC BLOOD PRESSURE: 100 MMHG | RESPIRATION RATE: 16 BRPM | TEMPERATURE: 99.7 F | HEART RATE: 86 BPM | OXYGEN SATURATION: 98 % | DIASTOLIC BLOOD PRESSURE: 58 MMHG

## 2019-06-19 VITALS
HEART RATE: 83 BPM | DIASTOLIC BLOOD PRESSURE: 60 MMHG | RESPIRATION RATE: 16 BRPM | OXYGEN SATURATION: 98 % | SYSTOLIC BLOOD PRESSURE: 110 MMHG | TEMPERATURE: 98.2 F

## 2019-06-19 PROCEDURE — G0153 HHCP-SVS OF S/L PATH,EA 15MN: HCPCS

## 2019-06-19 PROCEDURE — G0157 HHC PT ASSISTANT EA 15: HCPCS

## 2019-06-19 PROCEDURE — 665999 HH PPS REVENUE DEBIT

## 2019-06-19 PROCEDURE — 665998 HH PPS REVENUE CREDIT

## 2019-06-19 ASSESSMENT — ACTIVITIES OF DAILY LIVING (ADL)
BATHING_ASSISTANCE: 5
TELEPHONE_ASSISTANCE: 6
LAUNDRY_ASSISTANCE: 6
HOUSEKEEPING_ASSISTANCE: 6
ORAL_CARE_ASSISTANCE: 2
MEAL_PREP_ASSISTANCE: 6
DRESSING_LB_ASSISTANCE: 5
DRESSING_UB_ASSISTANCE: 2
GROOMING_ASSISTANCE: 2
BATHING_ASSISTIVE_EQUIPMENT_USED: SHOWER CHAIR
SHOPPING_ASSISTANCE: 6
TRANSPORTATION_ASSISTANCE: 6
EATING_ASSISTANCE: 2

## 2019-06-19 ASSESSMENT — ENCOUNTER SYMPTOMS
DEBILITATING PAIN: 1
DIFFICULTY THINKING: 1

## 2019-06-20 ENCOUNTER — HOME CARE VISIT (OUTPATIENT)
Dept: HOME HEALTH SERVICES | Facility: HOME HEALTHCARE | Age: 84
End: 2019-06-20
Payer: MEDICARE

## 2019-06-20 VITALS
TEMPERATURE: 98.7 F | HEART RATE: 60 BPM | RESPIRATION RATE: 16 BRPM | SYSTOLIC BLOOD PRESSURE: 110 MMHG | OXYGEN SATURATION: 98 % | DIASTOLIC BLOOD PRESSURE: 70 MMHG

## 2019-06-20 PROCEDURE — G0152 HHCP-SERV OF OT,EA 15 MIN: HCPCS

## 2019-06-20 PROCEDURE — 665998 HH PPS REVENUE CREDIT

## 2019-06-20 PROCEDURE — 665999 HH PPS REVENUE DEBIT

## 2019-06-20 ASSESSMENT — ENCOUNTER SYMPTOMS: DIFFICULTY THINKING: 1

## 2019-06-21 ENCOUNTER — HOME CARE VISIT (OUTPATIENT)
Dept: HOME HEALTH SERVICES | Facility: HOME HEALTHCARE | Age: 84
End: 2019-06-21
Payer: MEDICARE

## 2019-06-21 VITALS
WEIGHT: 128.5 LBS | TEMPERATURE: 98.6 F | OXYGEN SATURATION: 99 % | HEART RATE: 86 BPM | SYSTOLIC BLOOD PRESSURE: 116 MMHG | BODY MASS INDEX: 21.38 KG/M2 | RESPIRATION RATE: 12 BRPM | DIASTOLIC BLOOD PRESSURE: 66 MMHG

## 2019-06-21 PROCEDURE — G0495 RN CARE TRAIN/EDU IN HH: HCPCS

## 2019-06-21 PROCEDURE — 665999 HH PPS REVENUE DEBIT

## 2019-06-21 PROCEDURE — 665998 HH PPS REVENUE CREDIT

## 2019-06-21 ASSESSMENT — ENCOUNTER SYMPTOMS: POOR JUDGMENT: 1

## 2019-06-22 PROCEDURE — 665999 HH PPS REVENUE DEBIT

## 2019-06-22 PROCEDURE — 665998 HH PPS REVENUE CREDIT

## 2019-06-23 PROCEDURE — 665999 HH PPS REVENUE DEBIT

## 2019-06-23 PROCEDURE — 665998 HH PPS REVENUE CREDIT

## 2019-06-24 ENCOUNTER — HOSPITAL ENCOUNTER (OUTPATIENT)
Facility: MEDICAL CENTER | Age: 84
End: 2019-06-24
Attending: FAMILY MEDICINE
Payer: MEDICARE

## 2019-06-24 ENCOUNTER — HOME CARE VISIT (OUTPATIENT)
Dept: HOME HEALTH SERVICES | Facility: HOME HEALTHCARE | Age: 84
End: 2019-06-24
Payer: MEDICARE

## 2019-06-24 VITALS
RESPIRATION RATE: 14 BRPM | DIASTOLIC BLOOD PRESSURE: 70 MMHG | TEMPERATURE: 98.6 F | SYSTOLIC BLOOD PRESSURE: 110 MMHG | OXYGEN SATURATION: 94 % | HEART RATE: 77 BPM

## 2019-06-24 VITALS
RESPIRATION RATE: 18 BRPM | BODY MASS INDEX: 21.53 KG/M2 | TEMPERATURE: 97.8 F | WEIGHT: 129.38 LBS | DIASTOLIC BLOOD PRESSURE: 60 MMHG | OXYGEN SATURATION: 98 % | SYSTOLIC BLOOD PRESSURE: 110 MMHG | HEART RATE: 80 BPM

## 2019-06-24 LAB
ANION GAP SERPL CALC-SCNC: 6 MMOL/L (ref 0–11.9)
BUN SERPL-MCNC: 23 MG/DL (ref 8–22)
CALCIUM SERPL-MCNC: 9.1 MG/DL (ref 8.5–10.5)
CHLORIDE SERPL-SCNC: 105 MMOL/L (ref 96–112)
CO2 SERPL-SCNC: 26 MMOL/L (ref 20–33)
CREAT SERPL-MCNC: 0.86 MG/DL (ref 0.5–1.4)
GLUCOSE SERPL-MCNC: 141 MG/DL (ref 65–99)
POTASSIUM SERPL-SCNC: 3.8 MMOL/L (ref 3.6–5.5)
SODIUM SERPL-SCNC: 137 MMOL/L (ref 135–145)

## 2019-06-24 PROCEDURE — 80048 BASIC METABOLIC PNL TOTAL CA: CPT

## 2019-06-24 PROCEDURE — G0299 HHS/HOSPICE OF RN EA 15 MIN: HCPCS

## 2019-06-24 PROCEDURE — G0157 HHC PT ASSISTANT EA 15: HCPCS

## 2019-06-24 PROCEDURE — G0180 MD CERTIFICATION HHA PATIENT: HCPCS | Performed by: FAMILY MEDICINE

## 2019-06-24 PROCEDURE — 665998 HH PPS REVENUE CREDIT

## 2019-06-24 PROCEDURE — 665999 HH PPS REVENUE DEBIT

## 2019-06-24 ASSESSMENT — ENCOUNTER SYMPTOMS
POOR JUDGMENT: 1
DEPRESSED MOOD: 1

## 2019-06-25 ENCOUNTER — HOME CARE VISIT (OUTPATIENT)
Dept: HOME HEALTH SERVICES | Facility: HOME HEALTHCARE | Age: 84
End: 2019-06-25
Payer: MEDICARE

## 2019-06-25 VITALS
HEART RATE: 81 BPM | RESPIRATION RATE: 16 BRPM | SYSTOLIC BLOOD PRESSURE: 118 MMHG | TEMPERATURE: 99.1 F | DIASTOLIC BLOOD PRESSURE: 66 MMHG | OXYGEN SATURATION: 99 %

## 2019-06-25 PROCEDURE — G0153 HHCP-SVS OF S/L PATH,EA 15MN: HCPCS

## 2019-06-25 PROCEDURE — 665999 HH PPS REVENUE DEBIT

## 2019-06-25 PROCEDURE — G0152 HHCP-SERV OF OT,EA 15 MIN: HCPCS

## 2019-06-25 PROCEDURE — 665998 HH PPS REVENUE CREDIT

## 2019-06-26 ENCOUNTER — HOME CARE VISIT (OUTPATIENT)
Dept: HOME HEALTH SERVICES | Facility: HOME HEALTHCARE | Age: 84
End: 2019-06-26
Payer: MEDICARE

## 2019-06-26 VITALS
RESPIRATION RATE: 16 BRPM | HEART RATE: 84 BPM | SYSTOLIC BLOOD PRESSURE: 108 MMHG | OXYGEN SATURATION: 95 % | TEMPERATURE: 97.7 F | DIASTOLIC BLOOD PRESSURE: 60 MMHG

## 2019-06-26 PROCEDURE — G0157 HHC PT ASSISTANT EA 15: HCPCS

## 2019-06-26 PROCEDURE — 665999 HH PPS REVENUE DEBIT

## 2019-06-26 PROCEDURE — 665998 HH PPS REVENUE CREDIT

## 2019-06-27 ENCOUNTER — HOME CARE VISIT (OUTPATIENT)
Dept: HOME HEALTH SERVICES | Facility: HOME HEALTHCARE | Age: 84
End: 2019-06-27
Payer: MEDICARE

## 2019-06-27 VITALS
HEART RATE: 81 BPM | OXYGEN SATURATION: 99 % | DIASTOLIC BLOOD PRESSURE: 66 MMHG | TEMPERATURE: 99.1 F | RESPIRATION RATE: 16 BRPM | SYSTOLIC BLOOD PRESSURE: 118 MMHG

## 2019-06-27 VITALS
RESPIRATION RATE: 16 BRPM | DIASTOLIC BLOOD PRESSURE: 66 MMHG | OXYGEN SATURATION: 98 % | SYSTOLIC BLOOD PRESSURE: 118 MMHG | TEMPERATURE: 98.9 F | HEART RATE: 64 BPM

## 2019-06-27 PROCEDURE — G0152 HHCP-SERV OF OT,EA 15 MIN: HCPCS

## 2019-06-27 PROCEDURE — 665999 HH PPS REVENUE DEBIT

## 2019-06-27 PROCEDURE — 665998 HH PPS REVENUE CREDIT

## 2019-06-27 ASSESSMENT — ENCOUNTER SYMPTOMS: DIFFICULTY THINKING: 1

## 2019-06-28 PROCEDURE — 665998 HH PPS REVENUE CREDIT

## 2019-06-28 PROCEDURE — 665999 HH PPS REVENUE DEBIT

## 2019-06-29 PROCEDURE — 665999 HH PPS REVENUE DEBIT

## 2019-06-29 PROCEDURE — 665998 HH PPS REVENUE CREDIT

## 2019-06-30 PROCEDURE — 665998 HH PPS REVENUE CREDIT

## 2019-06-30 PROCEDURE — 665999 HH PPS REVENUE DEBIT

## 2019-07-01 ENCOUNTER — HOME CARE VISIT (OUTPATIENT)
Dept: HOME HEALTH SERVICES | Facility: HOME HEALTHCARE | Age: 84
End: 2019-07-01
Payer: MEDICARE

## 2019-07-01 VITALS
BODY MASS INDEX: 20.67 KG/M2 | HEART RATE: 72 BPM | RESPIRATION RATE: 18 BRPM | SYSTOLIC BLOOD PRESSURE: 104 MMHG | WEIGHT: 124.19 LBS | TEMPERATURE: 98.3 F | OXYGEN SATURATION: 96 % | DIASTOLIC BLOOD PRESSURE: 52 MMHG

## 2019-07-01 VITALS
RESPIRATION RATE: 16 BRPM | HEART RATE: 78 BPM | SYSTOLIC BLOOD PRESSURE: 110 MMHG | DIASTOLIC BLOOD PRESSURE: 60 MMHG | OXYGEN SATURATION: 94 % | TEMPERATURE: 98.3 F

## 2019-07-01 PROCEDURE — G0157 HHC PT ASSISTANT EA 15: HCPCS

## 2019-07-01 PROCEDURE — 665998 HH PPS REVENUE CREDIT

## 2019-07-01 PROCEDURE — 665999 HH PPS REVENUE DEBIT

## 2019-07-01 PROCEDURE — G0495 RN CARE TRAIN/EDU IN HH: HCPCS

## 2019-07-01 ASSESSMENT — ENCOUNTER SYMPTOMS: POOR JUDGMENT: 1

## 2019-07-02 ENCOUNTER — HOME CARE VISIT (OUTPATIENT)
Dept: HOME HEALTH SERVICES | Facility: HOME HEALTHCARE | Age: 84
End: 2019-07-02
Payer: MEDICARE

## 2019-07-02 VITALS
DIASTOLIC BLOOD PRESSURE: 60 MMHG | RESPIRATION RATE: 16 BRPM | SYSTOLIC BLOOD PRESSURE: 104 MMHG | TEMPERATURE: 99.1 F | OXYGEN SATURATION: 98 % | HEART RATE: 73 BPM

## 2019-07-02 PROCEDURE — G0152 HHCP-SERV OF OT,EA 15 MIN: HCPCS

## 2019-07-02 PROCEDURE — 665998 HH PPS REVENUE CREDIT

## 2019-07-02 PROCEDURE — 665999 HH PPS REVENUE DEBIT

## 2019-07-02 ASSESSMENT — ENCOUNTER SYMPTOMS: DIFFICULTY THINKING: 1

## 2019-07-03 ENCOUNTER — OFFICE VISIT (OUTPATIENT)
Dept: MEDICAL GROUP | Facility: MEDICAL CENTER | Age: 84
End: 2019-07-03
Payer: MEDICARE

## 2019-07-03 VITALS
TEMPERATURE: 97.8 F | OXYGEN SATURATION: 97 % | BODY MASS INDEX: 20.49 KG/M2 | SYSTOLIC BLOOD PRESSURE: 106 MMHG | DIASTOLIC BLOOD PRESSURE: 48 MMHG | HEART RATE: 68 BPM | WEIGHT: 123 LBS | HEIGHT: 65 IN

## 2019-07-03 DIAGNOSIS — B35.1 TOENAIL FUNGUS: ICD-10-CM

## 2019-07-03 DIAGNOSIS — Z23 NEED FOR VACCINATION: ICD-10-CM

## 2019-07-03 DIAGNOSIS — H61.21 IMPACTED CERUMEN OF RIGHT EAR: ICD-10-CM

## 2019-07-03 DIAGNOSIS — R25.1 TREMORS OF NERVOUS SYSTEM: ICD-10-CM

## 2019-07-03 DIAGNOSIS — Z87.81 S/P RIGHT HIP FRACTURE: ICD-10-CM

## 2019-07-03 DIAGNOSIS — R73.01 IFG (IMPAIRED FASTING GLUCOSE): ICD-10-CM

## 2019-07-03 PROCEDURE — 665998 HH PPS REVENUE CREDIT

## 2019-07-03 PROCEDURE — 90715 TDAP VACCINE 7 YRS/> IM: CPT | Performed by: NURSE PRACTITIONER

## 2019-07-03 PROCEDURE — 90732 PPSV23 VACC 2 YRS+ SUBQ/IM: CPT | Performed by: NURSE PRACTITIONER

## 2019-07-03 PROCEDURE — 665999 HH PPS REVENUE DEBIT

## 2019-07-03 PROCEDURE — 99214 OFFICE O/P EST MOD 30 MIN: CPT | Mod: 25 | Performed by: NURSE PRACTITIONER

## 2019-07-03 PROCEDURE — 90472 IMMUNIZATION ADMIN EACH ADD: CPT | Performed by: NURSE PRACTITIONER

## 2019-07-03 PROCEDURE — G0009 ADMIN PNEUMOCOCCAL VACCINE: HCPCS | Performed by: NURSE PRACTITIONER

## 2019-07-03 NOTE — PROGRESS NOTES
Subjective:     Ang Lancaster is a 84 y.o. female who presents with assisted living paperwork.    HPI:   Seen in f/u for assisted living paperwork.  She recently fell again and fx her rt hip.  She is now moving to assisted living.    She is having feet swelling today.  Took diuretic.  She has long toenails with fungus.  She is limited in what shoes she can wear d/t her long nails.    Her last lab in [facility] showed elevated glucose at 141.  She is due updated a1c.  Last year her a1c was 6.0.    She would like to take her valium 1/2 tab of 5 mg.  She uses for twitching and anxiety.  She does not relate any dizziness or her falls to the valium.  Been on long term.   havign difficulty with hearing out of her good ear.  Hx of wax impaction    Patient Active Problem List    Diagnosis Date Noted   • GERD (gastroesophageal reflux disease) 05/12/2019   • Hyponatremia 04/23/2018   • Closed left hip fracture (HCC) 04/22/2018   • Risk for falls 10/11/2017   • Osteoporosis    • Recurrent UTI    • Meige syndrome (blepharospasm with oromandibular dystonia)    • Degenerative joint disease    • Impaired fasting glucose    • Preventative health care 11/10/2009   • [place] (hard of hearing)        Current medicines (including changes today)  Current Outpatient Prescriptions   Medication Sig Dispense Refill   • furosemide (LASIX) 20 MG Tab Take 20 mg by mouth every day.     • potassium chloride SA (K-DUR) 10 MEQ Tab CR Take 10 mEq by mouth every day.     • acetaminophen (TYLENOL) 500 MG Tab Take 500 mg by mouth every four hours as needed.     • diazePAM (VALIUM) 5 MG Tab Take 2.5 mg by mouth 2 Times a Day.     • HYDROXYPROPYL METHYLCELLULOSE 0.3 % Solution Place 1 Drop in both eyes 2 Times a Day.       No current facility-administered medications for this visit.        Allergies   Allergen Reactions   • Other Environmental Runny Nose and Itching     pollens       ROS  Constitutional: Negative. Negative for fever, chills, weight loss,  "malaise/fatigue and diaphoresis.   HENT: Negative. Negative for ear pain, nosebleeds, congestion, sore throat, neck pain, tinnitus and ear discharge.   Respiratory: Negative. Negative for cough, hemoptysis, sputum production, shortness of breath, wheezing and stridor.   Cardiovascular: Negative. Negative for chest pain, palpitations, orthopnea, claudication, leg swelling and PND.   Gastrointestinal: Denies nausea, vomiting, diarrhea, constipation, heartburn, melena or hematochezia.  Genitourinary: Denies dysuria, hematuria, urinary incontinence, frequency or urgency.        Objective:     /48 (BP Location: Right arm, Patient Position: Sitting, BP Cuff Size: Adult)   Pulse 68   Temp 36.6 °C (97.8 °F) (Temporal)   Ht 1.651 m (5' 5\")   Wt 55.8 kg (123 lb)   SpO2 97%  Body mass index is 20.47 kg/m².    Physical Exam:  Physical Exam   Vitals reviewed.  Constitutional: oriented to person, place, and time. appears well-developed and well-nourished. No distress.   HENT:  Head: Normocephalic and atraumatic. Right Ear: External ear normal. Left Ear: External ear normal. Nose: Nose normal. Mouth/Throat: Oropharynx is clear and moist. No oropharyngeal exudate.  left tm wnl.  Rt tm blocked with cerumen.  Irrigated with h20 by MA with complete clearing with mod amt cerumen.  Eyes: Right eye exhibits no discharge. Left eye exhibits no discharge. No scleral icterus.  Neck: No JVD present.  Cardiovascular: Normal rate, regular rhythm, normal heart sounds and intact distal pulses.  Exam reveals no gallop and no friction rub.  No murmur heard.  No carotid bruits.   Pulmonary/Chest: Effort normal and breath sounds normal. No stridor. No respiratory distress. no wheezes or rales. exhibits no tenderness.   Musculoskeletal: Normal range of motion. exhibits 2+ KOSTA pedal edema. kosta pedal pulses 2+.  Lymphadenopathy: no cervical or supraclavicular adenopathy.   Neurological: alert and oriented to person, place, and time. exhibits " normal muscle tone. amb with walker  Skin: Skin is warm and dry. no diaphoresis. mildly thickened yellow long toenails.    Psychiatric: normal mood and affect. behavior is normal.        Assessment and Plan:     The following treatment plan was discussed:    1. S/P right hip fracture      compled assisted living paperwork   2. IFG (impaired fasting glucose)      do lab and f/u 3 mo for review   3. Toenail fungus  REFERRAL TO PODIATRY    refer to podiatry for nail clipping and foot care   4. Tremors of nervous system  Ear Irrigation (MA Only)    rt facial tremors.  may continue to use 1/2 tab valium 5 mg daily prn for tremors and anxiety   5. Impacted cerumen of right ear  Ear Irrigation (MA Only)    clear rt ear with mod amt wax with irrigation   6. Need for vaccination  Tdap =>6yo IM    Pneumococal Polysaccharide Vaccine 23-Valent =>1YO SQ/IM         Followup: Return in about 3 months (around 10/3/2019).

## 2019-07-04 ENCOUNTER — HOME CARE VISIT (OUTPATIENT)
Dept: HOME HEALTH SERVICES | Facility: HOME HEALTHCARE | Age: 84
End: 2019-07-04
Payer: MEDICARE

## 2019-07-04 PROCEDURE — 665998 HH PPS REVENUE CREDIT

## 2019-07-04 PROCEDURE — 665999 HH PPS REVENUE DEBIT

## 2019-07-04 PROCEDURE — G0151 HHCP-SERV OF PT,EA 15 MIN: HCPCS

## 2019-07-05 VITALS
RESPIRATION RATE: 16 BRPM | DIASTOLIC BLOOD PRESSURE: 60 MMHG | OXYGEN SATURATION: 97 % | TEMPERATURE: 98.4 F | HEART RATE: 72 BPM | SYSTOLIC BLOOD PRESSURE: 110 MMHG

## 2019-07-05 PROCEDURE — 665999 HH PPS REVENUE DEBIT

## 2019-07-05 PROCEDURE — 665998 HH PPS REVENUE CREDIT

## 2019-07-05 ASSESSMENT — ENCOUNTER SYMPTOMS: DIFFICULTY THINKING: 1

## 2019-07-06 PROCEDURE — 665999 HH PPS REVENUE DEBIT

## 2019-07-06 PROCEDURE — 665998 HH PPS REVENUE CREDIT

## 2019-07-07 PROCEDURE — 665999 HH PPS REVENUE DEBIT

## 2019-07-07 PROCEDURE — 665998 HH PPS REVENUE CREDIT

## 2019-07-08 PROCEDURE — 665999 HH PPS REVENUE DEBIT

## 2019-07-08 PROCEDURE — 665998 HH PPS REVENUE CREDIT

## 2019-07-09 PROCEDURE — 665999 HH PPS REVENUE DEBIT

## 2019-07-09 PROCEDURE — 665998 HH PPS REVENUE CREDIT

## 2019-07-10 ENCOUNTER — HOME CARE VISIT (OUTPATIENT)
Dept: HOME HEALTH SERVICES | Facility: HOME HEALTHCARE | Age: 84
End: 2019-07-10
Payer: MEDICARE

## 2019-07-10 VITALS
RESPIRATION RATE: 16 BRPM | HEART RATE: 79 BPM | SYSTOLIC BLOOD PRESSURE: 100 MMHG | DIASTOLIC BLOOD PRESSURE: 50 MMHG | TEMPERATURE: 99.1 F | OXYGEN SATURATION: 99 %

## 2019-07-10 PROCEDURE — G0495 RN CARE TRAIN/EDU IN HH: HCPCS

## 2019-07-10 PROCEDURE — 665999 HH PPS REVENUE DEBIT

## 2019-07-10 PROCEDURE — 665998 HH PPS REVENUE CREDIT

## 2019-07-10 ASSESSMENT — ENCOUNTER SYMPTOMS: POOR JUDGMENT: 1

## 2019-07-11 ENCOUNTER — HOME CARE VISIT (OUTPATIENT)
Dept: HOME HEALTH SERVICES | Facility: HOME HEALTHCARE | Age: 84
End: 2019-07-11
Payer: MEDICARE

## 2019-07-11 PROCEDURE — 665998 HH PPS REVENUE CREDIT

## 2019-07-11 PROCEDURE — 665999 HH PPS REVENUE DEBIT

## 2019-07-12 PROCEDURE — 665998 HH PPS REVENUE CREDIT

## 2019-07-12 PROCEDURE — 665999 HH PPS REVENUE DEBIT

## 2019-07-13 PROCEDURE — 665999 HH PPS REVENUE DEBIT

## 2019-07-13 PROCEDURE — 665998 HH PPS REVENUE CREDIT

## 2019-07-14 PROCEDURE — 665999 HH PPS REVENUE DEBIT

## 2019-07-14 PROCEDURE — 665998 HH PPS REVENUE CREDIT

## 2019-07-15 ENCOUNTER — HOME CARE VISIT (OUTPATIENT)
Dept: HOME HEALTH SERVICES | Facility: HOME HEALTHCARE | Age: 84
End: 2019-07-15
Payer: MEDICARE

## 2019-07-15 PROCEDURE — 665998 HH PPS REVENUE CREDIT

## 2019-07-15 PROCEDURE — 665999 HH PPS REVENUE DEBIT

## 2019-07-16 ENCOUNTER — HOME CARE VISIT (OUTPATIENT)
Dept: HOME HEALTH SERVICES | Facility: HOME HEALTHCARE | Age: 84
End: 2019-07-16
Payer: MEDICARE

## 2019-07-16 VITALS
HEART RATE: 78 BPM | OXYGEN SATURATION: 99 % | TEMPERATURE: 99.7 F | SYSTOLIC BLOOD PRESSURE: 110 MMHG | RESPIRATION RATE: 18 BRPM | DIASTOLIC BLOOD PRESSURE: 60 MMHG

## 2019-07-16 PROCEDURE — G0299 HHS/HOSPICE OF RN EA 15 MIN: HCPCS

## 2019-07-16 PROCEDURE — 665999 HH PPS REVENUE DEBIT

## 2019-07-16 PROCEDURE — 665998 HH PPS REVENUE CREDIT

## 2019-07-16 ASSESSMENT — ENCOUNTER SYMPTOMS
NAUSEA: DENIES
VOMITING: DENIES

## 2019-07-17 ENCOUNTER — HOME CARE VISIT (OUTPATIENT)
Dept: HOME HEALTH SERVICES | Facility: HOME HEALTHCARE | Age: 84
End: 2019-07-17
Payer: MEDICARE

## 2019-07-17 VITALS
HEART RATE: 91 BPM | DIASTOLIC BLOOD PRESSURE: 60 MMHG | TEMPERATURE: 99 F | SYSTOLIC BLOOD PRESSURE: 110 MMHG | OXYGEN SATURATION: 99 % | RESPIRATION RATE: 18 BRPM

## 2019-07-17 PROCEDURE — G0152 HHCP-SERV OF OT,EA 15 MIN: HCPCS

## 2019-07-17 PROCEDURE — 665999 HH PPS REVENUE DEBIT

## 2019-07-17 PROCEDURE — 665998 HH PPS REVENUE CREDIT

## 2019-07-18 PROCEDURE — 665999 HH PPS REVENUE DEBIT

## 2019-07-18 PROCEDURE — 665998 HH PPS REVENUE CREDIT

## 2019-07-19 PROCEDURE — 665998 HH PPS REVENUE CREDIT

## 2019-07-19 PROCEDURE — 665999 HH PPS REVENUE DEBIT

## 2019-07-20 PROCEDURE — 665999 HH PPS REVENUE DEBIT

## 2019-07-20 PROCEDURE — 665998 HH PPS REVENUE CREDIT

## 2019-07-21 PROCEDURE — 665998 HH PPS REVENUE CREDIT

## 2019-07-21 PROCEDURE — 665999 HH PPS REVENUE DEBIT

## 2019-07-22 PROCEDURE — 665998 HH PPS REVENUE CREDIT

## 2019-07-22 PROCEDURE — 665999 HH PPS REVENUE DEBIT

## 2019-07-23 ENCOUNTER — HOME CARE VISIT (OUTPATIENT)
Dept: HOME HEALTH SERVICES | Facility: HOME HEALTHCARE | Age: 84
End: 2019-07-23
Payer: MEDICARE

## 2019-07-23 VITALS
HEART RATE: 80 BPM | RESPIRATION RATE: 18 BRPM | TEMPERATURE: 99.3 F | SYSTOLIC BLOOD PRESSURE: 128 MMHG | OXYGEN SATURATION: 99 % | DIASTOLIC BLOOD PRESSURE: 75 MMHG

## 2019-07-23 VITALS — TEMPERATURE: 98.4 F | OXYGEN SATURATION: 98 % | HEART RATE: 67 BPM

## 2019-07-23 PROCEDURE — G0299 HHS/HOSPICE OF RN EA 15 MIN: HCPCS

## 2019-07-23 PROCEDURE — 665998 HH PPS REVENUE CREDIT

## 2019-07-23 PROCEDURE — 665999 HH PPS REVENUE DEBIT

## 2019-07-23 PROCEDURE — G0152 HHCP-SERV OF OT,EA 15 MIN: HCPCS

## 2019-07-23 ASSESSMENT — ENCOUNTER SYMPTOMS
NAUSEA: DENIES
VOMITING: DENIES

## 2019-07-24 PROCEDURE — 665999 HH PPS REVENUE DEBIT

## 2019-07-24 PROCEDURE — 665998 HH PPS REVENUE CREDIT

## 2019-07-25 PROCEDURE — 665998 HH PPS REVENUE CREDIT

## 2019-07-25 PROCEDURE — 665999 HH PPS REVENUE DEBIT

## 2019-07-26 PROCEDURE — 665999 HH PPS REVENUE DEBIT

## 2019-07-26 PROCEDURE — 665998 HH PPS REVENUE CREDIT

## 2019-07-27 PROCEDURE — 665998 HH PPS REVENUE CREDIT

## 2019-07-27 PROCEDURE — 665999 HH PPS REVENUE DEBIT

## 2019-07-28 PROCEDURE — 665998 HH PPS REVENUE CREDIT

## 2019-07-28 PROCEDURE — 665999 HH PPS REVENUE DEBIT

## 2019-07-29 PROCEDURE — 665998 HH PPS REVENUE CREDIT

## 2019-07-29 PROCEDURE — 665999 HH PPS REVENUE DEBIT

## 2019-07-30 PROCEDURE — 665999 HH PPS REVENUE DEBIT

## 2019-07-30 PROCEDURE — 665998 HH PPS REVENUE CREDIT

## 2019-07-31 PROCEDURE — 665999 HH PPS REVENUE DEBIT

## 2019-07-31 PROCEDURE — 665998 HH PPS REVENUE CREDIT

## 2019-08-01 ENCOUNTER — HOME CARE VISIT (OUTPATIENT)
Dept: HOME HEALTH SERVICES | Facility: HOME HEALTHCARE | Age: 84
End: 2019-08-01
Payer: MEDICARE

## 2019-08-01 VITALS
RESPIRATION RATE: 18 BRPM | SYSTOLIC BLOOD PRESSURE: 105 MMHG | DIASTOLIC BLOOD PRESSURE: 60 MMHG | TEMPERATURE: 97.9 F | OXYGEN SATURATION: 99 % | HEART RATE: 79 BPM

## 2019-08-01 PROCEDURE — 665998 HH PPS REVENUE CREDIT

## 2019-08-01 PROCEDURE — G0299 HHS/HOSPICE OF RN EA 15 MIN: HCPCS

## 2019-08-01 PROCEDURE — 665999 HH PPS REVENUE DEBIT

## 2019-08-02 PROCEDURE — 665999 HH PPS REVENUE DEBIT

## 2019-08-02 PROCEDURE — 665998 HH PPS REVENUE CREDIT

## 2019-08-03 PROCEDURE — 665998 HH PPS REVENUE CREDIT

## 2019-08-03 PROCEDURE — 665999 HH PPS REVENUE DEBIT

## 2019-08-04 PROCEDURE — 665998 HH PPS REVENUE CREDIT

## 2019-08-04 PROCEDURE — 665999 HH PPS REVENUE DEBIT

## 2019-08-05 PROCEDURE — 665998 HH PPS REVENUE CREDIT

## 2019-08-05 PROCEDURE — 665999 HH PPS REVENUE DEBIT

## 2019-08-05 ASSESSMENT — ENCOUNTER SYMPTOMS
VOMITING: DENIES
NAUSEA: DENIES
DEBILITATING PAIN: 1

## 2019-08-06 ENCOUNTER — HOME CARE VISIT (OUTPATIENT)
Dept: HOME HEALTH SERVICES | Facility: HOME HEALTHCARE | Age: 84
End: 2019-08-06
Payer: MEDICARE

## 2019-08-06 VITALS
HEART RATE: 87 BPM | SYSTOLIC BLOOD PRESSURE: 95 MMHG | TEMPERATURE: 99.5 F | DIASTOLIC BLOOD PRESSURE: 60 MMHG | RESPIRATION RATE: 18 BRPM | OXYGEN SATURATION: 98 %

## 2019-08-06 PROCEDURE — 665999 HH PPS REVENUE DEBIT

## 2019-08-06 PROCEDURE — 665998 HH PPS REVENUE CREDIT

## 2019-08-06 PROCEDURE — 665997 HH PPS REVENUE ADJ

## 2019-08-06 PROCEDURE — G0493 RN CARE EA 15 MIN HH/HOSPICE: HCPCS

## 2019-08-07 PROCEDURE — 665998 HH PPS REVENUE CREDIT

## 2019-08-07 PROCEDURE — 665999 HH PPS REVENUE DEBIT

## 2019-08-08 PROCEDURE — 665998 HH PPS REVENUE CREDIT

## 2019-08-08 PROCEDURE — 665999 HH PPS REVENUE DEBIT

## 2019-08-09 PROCEDURE — 665999 HH PPS REVENUE DEBIT

## 2019-08-09 PROCEDURE — 665998 HH PPS REVENUE CREDIT

## 2019-08-09 PROCEDURE — 665997 HH PPS REVENUE ADJ

## 2019-08-09 ASSESSMENT — ACTIVITIES OF DAILY LIVING (ADL)
OASIS_M1830: 02
HOME_HEALTH_OASIS: 01

## 2019-08-09 ASSESSMENT — PATIENT HEALTH QUESTIONNAIRE - PHQ9: CLINICAL INTERPRETATION OF PHQ2 SCORE: 0

## 2019-08-12 ENCOUNTER — TELEPHONE (OUTPATIENT)
Dept: MEDICAL GROUP | Facility: MEDICAL CENTER | Age: 84
End: 2019-08-12

## 2019-08-12 NOTE — TELEPHONE ENCOUNTER
Keep feet elevated. Low na diet.  antithrombolism stockings and drink adequate water.  If not improved, come into see me or if she has a cardiologist she can f/u with them

## 2019-08-12 NOTE — TELEPHONE ENCOUNTER
Pt called states she still has swollen feet and ankles. She has been taking lasix for 5 days-wants to know what to do

## 2019-12-03 ENCOUNTER — OCCUPATIONAL THERAPY (OUTPATIENT)
Dept: OCCUPATIONAL THERAPY | Facility: REHABILITATION | Age: 84
End: 2019-12-03
Attending: FAMILY MEDICINE
Payer: MEDICARE

## 2019-12-03 DIAGNOSIS — M62.81 MUSCLE WEAKNESS (GENERALIZED): ICD-10-CM

## 2019-12-03 PROCEDURE — 97750 PHYSICAL PERFORMANCE TEST: CPT

## 2019-12-03 ASSESSMENT — BALANCE ASSESSMENTS
BALANCE - STANDING STATIC: FAIR
BALANCE - SITTING DYNAMIC: FAIR +
BALANCE - STANDING DYNAMIC: FAIR
BALANCE - SITTING STATIC: FAIR +
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
## 2021-01-11 DIAGNOSIS — Z23 NEED FOR VACCINATION: ICD-10-CM

## 2021-05-24 ENCOUNTER — HOSPITAL ENCOUNTER (EMERGENCY)
Facility: MEDICAL CENTER | Age: 86
End: 2021-05-25
Attending: EMERGENCY MEDICINE
Payer: MEDICARE

## 2021-05-24 DIAGNOSIS — N39.0 ACUTE UTI: ICD-10-CM

## 2021-05-24 LAB
ALBUMIN SERPL BCP-MCNC: 4.2 G/DL (ref 3.2–4.9)
ALBUMIN/GLOB SERPL: 1.5 G/DL
ALP SERPL-CCNC: 60 U/L (ref 30–99)
ALT SERPL-CCNC: 6 U/L (ref 2–50)
ANION GAP SERPL CALC-SCNC: 9 MMOL/L (ref 7–16)
APPEARANCE UR: ABNORMAL
AST SERPL-CCNC: 14 U/L (ref 12–45)
BACTERIA #/AREA URNS HPF: ABNORMAL /HPF
BASOPHILS # BLD AUTO: 0.8 % (ref 0–1.8)
BASOPHILS # BLD: 0.07 K/UL (ref 0–0.12)
BILIRUB SERPL-MCNC: 0.3 MG/DL (ref 0.1–1.5)
BILIRUB UR QL STRIP.AUTO: NEGATIVE
BUN SERPL-MCNC: 29 MG/DL (ref 8–22)
CALCIUM SERPL-MCNC: 9.3 MG/DL (ref 8.4–10.2)
CHLORIDE SERPL-SCNC: 110 MMOL/L (ref 96–112)
CK SERPL-CCNC: 61 U/L (ref 0–154)
CO2 SERPL-SCNC: 27 MMOL/L (ref 20–33)
COLOR UR: YELLOW
CREAT SERPL-MCNC: 1.08 MG/DL (ref 0.5–1.4)
EOSINOPHIL # BLD AUTO: 0.21 K/UL (ref 0–0.51)
EOSINOPHIL NFR BLD: 2.4 % (ref 0–6.9)
EPI CELLS #/AREA URNS HPF: ABNORMAL /HPF
ERYTHROCYTE [DISTWIDTH] IN BLOOD BY AUTOMATED COUNT: 48.1 FL (ref 35.9–50)
GLOBULIN SER CALC-MCNC: 2.8 G/DL (ref 1.9–3.5)
GLUCOSE SERPL-MCNC: 104 MG/DL (ref 65–99)
GLUCOSE UR STRIP.AUTO-MCNC: NEGATIVE MG/DL
HCT VFR BLD AUTO: 43 % (ref 37–47)
HGB BLD-MCNC: 13.9 G/DL (ref 12–16)
IMM GRANULOCYTES # BLD AUTO: 0.04 K/UL (ref 0–0.11)
IMM GRANULOCYTES NFR BLD AUTO: 0.5 % (ref 0–0.9)
KETONES UR STRIP.AUTO-MCNC: NEGATIVE MG/DL
LACTATE BLD-SCNC: 1.1 MMOL/L (ref 0.5–2)
LEUKOCYTE ESTERASE UR QL STRIP.AUTO: ABNORMAL
LYMPHOCYTES # BLD AUTO: 1.73 K/UL (ref 1–4.8)
LYMPHOCYTES NFR BLD: 19.6 % (ref 22–41)
MCH RBC QN AUTO: 31.9 PG (ref 27–33)
MCHC RBC AUTO-ENTMCNC: 32.3 G/DL (ref 33.6–35)
MCV RBC AUTO: 98.6 FL (ref 81.4–97.8)
MICRO URNS: ABNORMAL
MONOCYTES # BLD AUTO: 0.49 K/UL (ref 0–0.85)
MONOCYTES NFR BLD AUTO: 5.6 % (ref 0–13.4)
MUCOUS THREADS #/AREA URNS HPF: ABNORMAL /HPF
NEUTROPHILS # BLD AUTO: 6.28 K/UL (ref 2–7.15)
NEUTROPHILS NFR BLD: 71.1 % (ref 44–72)
NITRITE UR QL STRIP.AUTO: POSITIVE
NRBC # BLD AUTO: 0 K/UL
NRBC BLD-RTO: 0 /100 WBC
PH UR STRIP.AUTO: 5.5 [PH] (ref 5–8)
PLATELET # BLD AUTO: 210 K/UL (ref 164–446)
PMV BLD AUTO: 10.4 FL (ref 9–12.9)
POTASSIUM SERPL-SCNC: 3.7 MMOL/L (ref 3.6–5.5)
PROT SERPL-MCNC: 7 G/DL (ref 6–8.2)
PROT UR QL STRIP: NEGATIVE MG/DL
RBC # BLD AUTO: 4.36 M/UL (ref 4.2–5.4)
RBC # URNS HPF: ABNORMAL /HPF
RBC UR QL AUTO: ABNORMAL
SODIUM SERPL-SCNC: 146 MMOL/L (ref 135–145)
SP GR UR STRIP.AUTO: 1.02
WBC # BLD AUTO: 8.8 K/UL (ref 4.8–10.8)
WBC #/AREA URNS HPF: ABNORMAL /HPF

## 2021-05-24 PROCEDURE — 85025 COMPLETE CBC W/AUTO DIFF WBC: CPT

## 2021-05-24 PROCEDURE — 96365 THER/PROPH/DIAG IV INF INIT: CPT

## 2021-05-24 PROCEDURE — 700105 HCHG RX REV CODE 258: Performed by: EMERGENCY MEDICINE

## 2021-05-24 PROCEDURE — 83605 ASSAY OF LACTIC ACID: CPT

## 2021-05-24 PROCEDURE — 99285 EMERGENCY DEPT VISIT HI MDM: CPT

## 2021-05-24 PROCEDURE — 700111 HCHG RX REV CODE 636 W/ 250 OVERRIDE (IP): Performed by: EMERGENCY MEDICINE

## 2021-05-24 PROCEDURE — 36415 COLL VENOUS BLD VENIPUNCTURE: CPT

## 2021-05-24 PROCEDURE — 51701 INSERT BLADDER CATHETER: CPT

## 2021-05-24 PROCEDURE — 82550 ASSAY OF CK (CPK): CPT

## 2021-05-24 PROCEDURE — 80053 COMPREHEN METABOLIC PANEL: CPT

## 2021-05-24 PROCEDURE — 81001 URINALYSIS AUTO W/SCOPE: CPT

## 2021-05-24 RX ADMIN — CEFTRIAXONE SODIUM 2 G: 2 INJECTION, POWDER, FOR SOLUTION INTRAMUSCULAR; INTRAVENOUS at 23:47

## 2021-05-25 VITALS
OXYGEN SATURATION: 97 % | RESPIRATION RATE: 20 BRPM | WEIGHT: 120 LBS | SYSTOLIC BLOOD PRESSURE: 139 MMHG | HEIGHT: 65 IN | TEMPERATURE: 98.2 F | DIASTOLIC BLOOD PRESSURE: 63 MMHG | BODY MASS INDEX: 19.99 KG/M2 | HEART RATE: 70 BPM

## 2021-05-25 RX ORDER — SULFAMETHOXAZOLE AND TRIMETHOPRIM 800; 160 MG/1; MG/1
1 TABLET ORAL 2 TIMES DAILY
Qty: 6 TABLET | Refills: 0 | Status: SHIPPED | OUTPATIENT
Start: 2021-05-25 | End: 2021-05-28

## 2021-05-25 RX ORDER — SULFAMETHOXAZOLE AND TRIMETHOPRIM 800; 160 MG/1; MG/1
1 TABLET ORAL 2 TIMES DAILY
Qty: 6 TABLET | Refills: 0 | Status: SHIPPED | OUTPATIENT
Start: 2021-05-25 | End: 2021-05-25 | Stop reason: SDUPTHER

## 2021-05-25 ASSESSMENT — ENCOUNTER SYMPTOMS
BRUISES/BLEEDS EASILY: 0
BACK PAIN: 0
EYE PAIN: 0
SEIZURES: 0
FEVER: 0
NECK PAIN: 0
CONSTITUTIONAL NEGATIVE: 1
MYALGIAS: 0
ABDOMINAL PAIN: 0
BLOOD IN STOOL: 0
GASTROINTESTINAL NEGATIVE: 1
HALLUCINATIONS: 0
EYES NEGATIVE: 1
SORE THROAT: 0
RESPIRATORY NEGATIVE: 1
WEAKNESS: 0
FOCAL WEAKNESS: 0
SHORTNESS OF BREATH: 0
CARDIOVASCULAR NEGATIVE: 1
FLANK PAIN: 0
HEADACHES: 0

## 2021-05-25 NOTE — DISCHARGE INSTRUCTIONS
Please discuss the following findings with your regular doctor:  Labs Reviewed   CBC WITH DIFFERENTIAL - Abnormal; Notable for the following components:       Result Value    MCV 98.6 (*)     MCHC 32.3 (*)     Lymphocytes 19.60 (*)     All other components within normal limits   COMP METABOLIC PANEL - Abnormal; Notable for the following components:    Sodium 146 (*)     Glucose 104 (*)     Bun 29 (*)     All other components within normal limits   URINALYSIS - Abnormal; Notable for the following components:    Character Hazy (*)     Nitrite Positive (*)     Leukocyte Esterase Trace (*)     Occult Blood Trace (*)     All other components within normal limits   ESTIMATED GFR - Abnormal; Notable for the following components:    GFR If  58 (*)     GFR If Non  48 (*)     All other components within normal limits   URINE MICROSCOPIC (W/UA) - Abnormal; Notable for the following components:    WBC 20-50 (*)     RBC 2-5 (*)     Bacteria Moderate (*)     All other components within normal limits   CREATINE KINASE   LACTIC ACID

## 2021-05-25 NOTE — ED PROVIDER NOTES
ED Provider Note    CHIEF COMPLAINT  Chief Complaint   Patient presents with   • Agitation       HPI  HPI       86-year-old female with past medical history of recurrent UTIs presents to the emergency department with increased agitation over the past couple days.  Patient at baseline per EMS brought from skilled nursing facility.  Patient has been combative with staff members and has displayed similar symptoms when dealing with a UTI.  Patient denies any acute pain at this time.       REVIEW OF SYSTEMS  Review of Systems   Constitutional: Negative.  Negative for fever.   HENT: Negative.  Negative for ear pain and sore throat.    Eyes: Negative.  Negative for pain.   Respiratory: Negative.  Negative for shortness of breath.    Cardiovascular: Negative.  Negative for chest pain.   Gastrointestinal: Negative.  Negative for abdominal pain and blood in stool.   Genitourinary: Negative for dysuria and flank pain.   Musculoskeletal: Negative for back pain, myalgias and neck pain.   Skin: Negative.  Negative for rash.   Neurological: Negative for focal weakness, seizures, weakness and headaches.   Endo/Heme/Allergies: Does not bruise/bleed easily.   Psychiatric/Behavioral: Negative for hallucinations and suicidal ideas.   All other systems reviewed and are negative.      PAST MEDICAL HISTORY   has a past medical history of Degenerative joint disease, [place] (hard of hearing), IFG (impaired fasting glucose), Impaired fasting glucose, Meige syndrome (blepharospasm with oromandibular dystonia), Osteoporosis, Recurrent UTI, Risk for falls (10/11/2017), and Vertigo.    SOCIAL HISTORY  Social History     Tobacco Use   • Smoking status: Former Smoker     Packs/day: 1.50     Years: 45.00     Pack years: 67.50     Types: Cigarettes     Quit date: 2004     Years since quittin.4   • Smokeless tobacco: Never Used   Substance and Sexual Activity   • Alcohol use: Yes     Alcohol/week: 3.5 - 17.5 oz     Types: 7 - 35 Glasses of wine  "per week   • Drug use: Yes     Comment: CBD oil   • Sexual activity: Not on file       SURGICAL HISTORY   has a past surgical history that includes tubal coagulation laparoscopic bilateral (1973); hip cannulated screw (Left, 4/22/2018); and hip cannulated screw (Right, 5/13/2019).    CURRENT MEDICATIONS  Home Medications    **Home medications have not yet been reviewed for this encounter**         ALLERGIES  Allergies   Allergen Reactions   • Other Environmental Runny Nose and Itching     pollens       PHYSICAL EXAM  VITAL SIGNS: /63   Pulse 70   Temp 36.8 °C (98.2 °F) (Temporal)   Resp 20   Ht 1.651 m (5' 5\")   Wt 54.4 kg (120 lb)   SpO2 97%   BMI 19.97 kg/m²  @YUDI[[id number]::@  Pulse ox interpretation: I interpret this pulse ox as normal.    Physical Exam  HENT:      Head: Normocephalic and atraumatic.      Right Ear: External ear normal.      Left Ear: External ear normal.   Eyes:      General: No scleral icterus.     Conjunctiva/sclera: Conjunctivae normal.   Cardiovascular:      Rate and Rhythm: Normal rate.   Pulmonary:      Effort: Pulmonary effort is normal. No respiratory distress.      Breath sounds: No stridor. No wheezing.   Abdominal:      General: There is no distension.      Tenderness: There is no abdominal tenderness.   Musculoskeletal:         General: No deformity. Normal range of motion.      Cervical back: Normal range of motion.   Skin:     General: Skin is warm and dry.      Findings: No erythema or rash.   Neurological:      Mental Status: She is alert. Mental status is at baseline.      Coordination: Coordination normal.   Psychiatric:         Mood and Affect: Affect normal.         Judgment: Judgment normal.     No CVA tenderness palpation.  No abdominal tenderness palpation.  1+ pitting edema bilateral lower extremities.    DIAGNOSTIC STUDIES / PROCEDURES    LABS/EKG  Results for orders placed or performed during the hospital encounter of 05/24/21   CBC WITH DIFFERENTIAL   Result " Value Ref Range    WBC 8.8 4.8 - 10.8 K/uL    RBC 4.36 4.20 - 5.40 M/uL    Hemoglobin 13.9 12.0 - 16.0 g/dL    Hematocrit 43.0 37.0 - 47.0 %    MCV 98.6 (H) 81.4 - 97.8 fL    MCH 31.9 27.0 - 33.0 pg    MCHC 32.3 (L) 33.6 - 35.0 g/dL    RDW 48.1 35.9 - 50.0 fL    Platelet Count 210 164 - 446 K/uL    MPV 10.4 9.0 - 12.9 fL    Neutrophils-Polys 71.10 44.00 - 72.00 %    Lymphocytes 19.60 (L) 22.00 - 41.00 %    Monocytes 5.60 0.00 - 13.40 %    Eosinophils 2.40 0.00 - 6.90 %    Basophils 0.80 0.00 - 1.80 %    Immature Granulocytes 0.50 0.00 - 0.90 %    Nucleated RBC 0.00 /100 WBC    Neutrophils (Absolute) 6.28 2.00 - 7.15 K/uL    Lymphs (Absolute) 1.73 1.00 - 4.80 K/uL    Monos (Absolute) 0.49 0.00 - 0.85 K/uL    Eos (Absolute) 0.21 0.00 - 0.51 K/uL    Baso (Absolute) 0.07 0.00 - 0.12 K/uL    Immature Granulocytes (abs) 0.04 0.00 - 0.11 K/uL    NRBC (Absolute) 0.00 K/uL   CMP   Result Value Ref Range    Sodium 146 (H) 135 - 145 mmol/L    Potassium 3.7 3.6 - 5.5 mmol/L    Chloride 110 96 - 112 mmol/L    Co2 27 20 - 33 mmol/L    Anion Gap 9.0 7.0 - 16.0    Glucose 104 (H) 65 - 99 mg/dL    Bun 29 (H) 8 - 22 mg/dL    Creatinine 1.08 0.50 - 1.40 mg/dL    Calcium 9.3 8.4 - 10.2 mg/dL    AST(SGOT) 14 12 - 45 U/L    ALT(SGPT) 6 2 - 50 U/L    Alkaline Phosphatase 60 30 - 99 U/L    Total Bilirubin 0.3 0.1 - 1.5 mg/dL    Albumin 4.2 3.2 - 4.9 g/dL    Total Protein 7.0 6.0 - 8.2 g/dL    Globulin 2.8 1.9 - 3.5 g/dL    A-G Ratio 1.5 g/dL   URINALYSIS    Specimen: Urine   Result Value Ref Range    Color Yellow     Character Hazy (A)     Specific Gravity 1.025 <1.035    Ph 5.5 5.0 - 8.0    Glucose Negative Negative mg/dL    Ketones Negative Negative mg/dL    Protein Negative Negative mg/dL    Bilirubin Negative Negative    Nitrite Positive (A) Negative    Leukocyte Esterase Trace (A) Negative    Occult Blood Trace (A) Negative    Micro Urine Req Microscopic    CREATINE KINASE   Result Value Ref Range    CPK Total 61 0 - 154 U/L   LACTIC  ACID   Result Value Ref Range    Lactic Acid 1.1 0.5 - 2.0 mmol/L   ESTIMATED GFR   Result Value Ref Range    GFR If  58 (A) >60 mL/min/1.73 m 2    GFR If Non  48 (A) >60 mL/min/1.73 m 2   URINE MICROSCOPIC (W/UA)   Result Value Ref Range    WBC 20-50 (A) /hpf    RBC 2-5 (A) /hpf    Bacteria Moderate (A) None /hpf    Epithelial Cells Few Few /hpf    Mucous Threads Few /hpf       RADIOLOGY  No orders to display        COURSE & MEDICAL DECISION MAKING  Pertinent Labs & Imaging studies reviewed by me. (See chart for details)  I verified that the patient was wearing a mask and I was wearing appropriate PPE every time I entered the room. The patient's mask was on the patient at all times during my encounter except for a brief view of the oropharynx.     86 y.o. female PMH frequent UTI p/w increased agitation.     Differential diagnosis includes but is not limited to:  Pt w/ uncomplicated UTI  Pt given rx for antibx and plan for f/u w/ PCP if sx not completely resolved or if return.  Pt instructed that if sx worsen or if any further concerns to return immediately to the ED.     Patient denies any flank pain and has no CVA tenderness palpation.    No rash present on exam  No hematuria       Patient tolerating p.o. prior to discharge    FINAL IMPRESSION  Visit Diagnoses     ICD-10-CM   1. Acute UTI  N39.0              Electronically signed by: Eder Figueroa M.D., 5/24/2021 11:04 PM

## 2021-05-25 NOTE — ED NOTES
Discharge instructions reviewed with patient. AVS signed by patient. PIV removed. No new prescriptions / Prescriptions electronically sent to pharmacy of choice. Patient understands need for follow-up appointment with healthcare team and to return to ED for worsening symptoms. All questions answered at this time. Patient ambulated to exit with belongings. Patient in stable condition with no signs of distress. Patient agreeable to discharge instructions.
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
## 2021-05-25 NOTE — ED NOTES
"Patient becoming slightly agitated. States \"If I don't get out of here in 30 minutes I'm going to scream.\" Patient educated on plan of care. Safety precautions are in place including gurney locked and in lowest position, call light within reach.  "

## 2021-05-25 NOTE — ED NOTES
Blood and UA samples collected and delivered to lab. Patient given warm blankets. Safety precautions are in place including gurney locked and in lowest position, call light within reach.

## 2021-06-25 ENCOUNTER — HOSPITAL ENCOUNTER (EMERGENCY)
Facility: MEDICAL CENTER | Age: 86
End: 2021-06-25
Payer: MEDICARE

## 2021-06-25 ENCOUNTER — HOSPITAL ENCOUNTER (OUTPATIENT)
Dept: LAB | Facility: MEDICAL CENTER | Age: 86
DRG: 690 | End: 2021-06-25
Attending: INTERNAL MEDICINE
Payer: MEDICARE

## 2021-06-25 VITALS
HEART RATE: 77 BPM | OXYGEN SATURATION: 96 % | SYSTOLIC BLOOD PRESSURE: 116 MMHG | TEMPERATURE: 99 F | BODY MASS INDEX: 21.3 KG/M2 | DIASTOLIC BLOOD PRESSURE: 66 MMHG | RESPIRATION RATE: 20 BRPM | WEIGHT: 127.87 LBS | HEIGHT: 65 IN

## 2021-06-25 LAB
APPEARANCE UR: ABNORMAL
BACTERIA #/AREA URNS HPF: ABNORMAL /HPF
BILIRUB UR QL STRIP.AUTO: NEGATIVE
COLOR UR: YELLOW
EPI CELLS #/AREA URNS HPF: ABNORMAL /HPF
GLUCOSE UR STRIP.AUTO-MCNC: NEGATIVE MG/DL
HYALINE CASTS #/AREA URNS LPF: ABNORMAL /LPF
KETONES UR STRIP.AUTO-MCNC: NEGATIVE MG/DL
LEUKOCYTE ESTERASE UR QL STRIP.AUTO: ABNORMAL
MICRO URNS: ABNORMAL
MUCOUS THREADS #/AREA URNS HPF: ABNORMAL /HPF
NITRITE UR QL STRIP.AUTO: POSITIVE
PH UR STRIP.AUTO: 5.5 [PH] (ref 5–8)
PROT UR QL STRIP: NEGATIVE MG/DL
RBC # URNS HPF: ABNORMAL /HPF
RBC UR QL AUTO: ABNORMAL
SP GR UR STRIP.AUTO: >=1.03
WBC #/AREA URNS HPF: ABNORMAL /HPF

## 2021-06-25 PROCEDURE — 87077 CULTURE AEROBIC IDENTIFY: CPT | Mod: 91

## 2021-06-25 PROCEDURE — 87186 SC STD MICRODIL/AGAR DIL: CPT

## 2021-06-25 PROCEDURE — 81001 URINALYSIS AUTO W/SCOPE: CPT

## 2021-06-25 PROCEDURE — 87086 URINE CULTURE/COLONY COUNT: CPT

## 2021-06-25 PROCEDURE — 302449 STATCHG TRIAGE ONLY (STATISTIC)

## 2021-06-25 ASSESSMENT — FIBROSIS 4 INDEX: FIB4 SCORE: 2.29

## 2021-06-25 NOTE — ED TRIAGE NOTES
"Pt is the resident of a group home.  She is currently being observed for recurrence of UTI.  She is accompanied by her daughter with complaints of a productive cough with chest wall pain upon coughing, observed this AM, and persistence of dysuria.     Chief Complaint   Patient presents with   • Cough   • UTI   • Chest Wall Pain     /66   Pulse 77   Temp 37.2 °C (99 °F) (Temporal)   Resp 20   Ht 1.651 m (5' 5\")   Wt 58 kg (127 lb 13.9 oz)   SpO2 96%   BMI 21.28 kg/m²      "

## 2021-06-27 ENCOUNTER — APPOINTMENT (OUTPATIENT)
Dept: RADIOLOGY | Facility: MEDICAL CENTER | Age: 86
DRG: 690 | End: 2021-06-27
Attending: EMERGENCY MEDICINE
Payer: MEDICARE

## 2021-06-27 ENCOUNTER — HOSPITAL ENCOUNTER (INPATIENT)
Facility: MEDICAL CENTER | Age: 86
LOS: 2 days | DRG: 690 | End: 2021-06-29
Attending: EMERGENCY MEDICINE | Admitting: INTERNAL MEDICINE
Payer: MEDICARE

## 2021-06-27 PROBLEM — G30.9 ALZHEIMER'S DEMENTIA WITH BEHAVIORAL DISTURBANCE (HCC): Status: ACTIVE | Noted: 2021-06-27

## 2021-06-27 PROBLEM — E87.0 HYPERNATREMIA: Status: ACTIVE | Noted: 2021-06-27
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
## 2021-06-27 PROBLEM — F02.818 ALZHEIMER'S DEMENTIA WITH BEHAVIORAL DISTURBANCE (HCC): Status: ACTIVE | Noted: 2021-06-27

## 2021-06-27 LAB
ALBUMIN SERPL BCP-MCNC: 3.7 G/DL (ref 3.2–4.9)
ALBUMIN/GLOB SERPL: 1.2 G/DL
ALP SERPL-CCNC: 68 U/L (ref 30–99)
ALT SERPL-CCNC: 19 U/L (ref 2–50)
ANION GAP SERPL CALC-SCNC: 14 MMOL/L (ref 7–16)
APPEARANCE UR: CLEAR
AST SERPL-CCNC: 27 U/L (ref 12–45)
BACTERIA #/AREA URNS HPF: ABNORMAL /HPF
BACTERIA UR CULT: ABNORMAL
BACTERIA UR CULT: ABNORMAL
BASOPHILS # BLD AUTO: 0.7 % (ref 0–1.8)
BASOPHILS # BLD: 0.06 K/UL (ref 0–0.12)
BILIRUB SERPL-MCNC: 0.4 MG/DL (ref 0.1–1.5)
BILIRUB UR QL STRIP.AUTO: NEGATIVE
BUN SERPL-MCNC: 23 MG/DL (ref 8–22)
CALCIUM SERPL-MCNC: 8.9 MG/DL (ref 8.4–10.2)
CHLORIDE SERPL-SCNC: 112 MMOL/L (ref 96–112)
CO2 SERPL-SCNC: 25 MMOL/L (ref 20–33)
COLOR UR: YELLOW
CREAT SERPL-MCNC: 1.03 MG/DL (ref 0.5–1.4)
EOSINOPHIL # BLD AUTO: 0.14 K/UL (ref 0–0.51)
EOSINOPHIL NFR BLD: 1.6 % (ref 0–6.9)
EPI CELLS #/AREA URNS HPF: ABNORMAL /HPF
ERYTHROCYTE [DISTWIDTH] IN BLOOD BY AUTOMATED COUNT: 48.9 FL (ref 35.9–50)
GLOBULIN SER CALC-MCNC: 3 G/DL (ref 1.9–3.5)
GLUCOSE SERPL-MCNC: 100 MG/DL (ref 65–99)
GLUCOSE UR STRIP.AUTO-MCNC: ABNORMAL MG/DL
HCT VFR BLD AUTO: 40.9 % (ref 37–47)
HGB BLD-MCNC: 13.2 G/DL (ref 12–16)
HYALINE CASTS #/AREA URNS LPF: ABNORMAL /LPF
IMM GRANULOCYTES # BLD AUTO: 0.05 K/UL (ref 0–0.11)
IMM GRANULOCYTES NFR BLD AUTO: 0.6 % (ref 0–0.9)
KETONES UR STRIP.AUTO-MCNC: NEGATIVE MG/DL
LEUKOCYTE ESTERASE UR QL STRIP.AUTO: ABNORMAL
LYMPHOCYTES # BLD AUTO: 1.31 K/UL (ref 1–4.8)
LYMPHOCYTES NFR BLD: 14.7 % (ref 22–41)
MCH RBC QN AUTO: 32 PG (ref 27–33)
MCHC RBC AUTO-ENTMCNC: 32.3 G/DL (ref 33.6–35)
MCV RBC AUTO: 99.3 FL (ref 81.4–97.8)
MICRO URNS: ABNORMAL
MONOCYTES # BLD AUTO: 0.6 K/UL (ref 0–0.85)
MONOCYTES NFR BLD AUTO: 6.7 % (ref 0–13.4)
MUCOUS THREADS #/AREA URNS HPF: ABNORMAL /HPF
NEUTROPHILS # BLD AUTO: 6.77 K/UL (ref 2–7.15)
NEUTROPHILS NFR BLD: 75.7 % (ref 44–72)
NITRITE UR QL STRIP.AUTO: POSITIVE
NRBC # BLD AUTO: 0 K/UL
NRBC BLD-RTO: 0 /100 WBC
PH UR STRIP.AUTO: 5.5 [PH] (ref 5–8)
PLATELET # BLD AUTO: 224 K/UL (ref 164–446)
PMV BLD AUTO: 10.9 FL (ref 9–12.9)
POTASSIUM SERPL-SCNC: 3.7 MMOL/L (ref 3.6–5.5)
PROT SERPL-MCNC: 6.7 G/DL (ref 6–8.2)
PROT UR QL STRIP: NEGATIVE MG/DL
RBC # BLD AUTO: 4.12 M/UL (ref 4.2–5.4)
RBC # URNS HPF: ABNORMAL /HPF
RBC UR QL AUTO: ABNORMAL
SIGNIFICANT IND 70042: ABNORMAL
SITE SITE: ABNORMAL
SODIUM SERPL-SCNC: 151 MMOL/L (ref 135–145)
SOURCE SOURCE: ABNORMAL
SP GR UR STRIP.AUTO: 1.02
WBC # BLD AUTO: 8.9 K/UL (ref 4.8–10.8)
WBC #/AREA URNS HPF: ABNORMAL /HPF

## 2021-06-27 PROCEDURE — 700111 HCHG RX REV CODE 636 W/ 250 OVERRIDE (IP): Performed by: EMERGENCY MEDICINE

## 2021-06-27 PROCEDURE — 99285 EMERGENCY DEPT VISIT HI MDM: CPT

## 2021-06-27 PROCEDURE — 85025 COMPLETE CBC W/AUTO DIFF WBC: CPT

## 2021-06-27 PROCEDURE — 73564 X-RAY EXAM KNEE 4 OR MORE: CPT | Mod: RT

## 2021-06-27 PROCEDURE — 71045 X-RAY EXAM CHEST 1 VIEW: CPT

## 2021-06-27 PROCEDURE — 70450 CT HEAD/BRAIN W/O DYE: CPT | Mod: ME

## 2021-06-27 PROCEDURE — 700105 HCHG RX REV CODE 258: Performed by: INTERNAL MEDICINE

## 2021-06-27 PROCEDURE — A9270 NON-COVERED ITEM OR SERVICE: HCPCS | Performed by: INTERNAL MEDICINE

## 2021-06-27 PROCEDURE — 36415 COLL VENOUS BLD VENIPUNCTURE: CPT

## 2021-06-27 PROCEDURE — 99223 1ST HOSP IP/OBS HIGH 75: CPT | Mod: AI | Performed by: INTERNAL MEDICINE

## 2021-06-27 PROCEDURE — 81001 URINALYSIS AUTO W/SCOPE: CPT

## 2021-06-27 PROCEDURE — 73502 X-RAY EXAM HIP UNI 2-3 VIEWS: CPT | Mod: RT

## 2021-06-27 PROCEDURE — 700102 HCHG RX REV CODE 250 W/ 637 OVERRIDE(OP): Performed by: INTERNAL MEDICINE

## 2021-06-27 PROCEDURE — 96365 THER/PROPH/DIAG IV INF INIT: CPT

## 2021-06-27 PROCEDURE — 770006 HCHG ROOM/CARE - MED/SURG/GYN SEMI*

## 2021-06-27 PROCEDURE — 700105 HCHG RX REV CODE 258: Performed by: EMERGENCY MEDICINE

## 2021-06-27 PROCEDURE — 80053 COMPREHEN METABOLIC PANEL: CPT

## 2021-06-27 PROCEDURE — 700111 HCHG RX REV CODE 636 W/ 250 OVERRIDE (IP): Performed by: INTERNAL MEDICINE

## 2021-06-27 PROCEDURE — 51701 INSERT BLADDER CATHETER: CPT

## 2021-06-27 RX ORDER — HEPARIN SODIUM 5000 [USP'U]/ML
5000 INJECTION, SOLUTION INTRAVENOUS; SUBCUTANEOUS EVERY 8 HOURS
Status: DISCONTINUED | OUTPATIENT
Start: 2021-06-27 | End: 2021-06-29 | Stop reason: HOSPADM

## 2021-06-27 RX ORDER — LABETALOL HYDROCHLORIDE 5 MG/ML
10 INJECTION, SOLUTION INTRAVENOUS EVERY 4 HOURS PRN
Status: DISCONTINUED | OUTPATIENT
Start: 2021-06-27 | End: 2021-06-29 | Stop reason: HOSPADM

## 2021-06-27 RX ORDER — ENALAPRILAT 1.25 MG/ML
1.25 INJECTION INTRAVENOUS EVERY 6 HOURS PRN
Status: DISCONTINUED | OUTPATIENT
Start: 2021-06-27 | End: 2021-06-29 | Stop reason: HOSPADM

## 2021-06-27 RX ORDER — AMOXICILLIN 250 MG
2 CAPSULE ORAL 2 TIMES DAILY
Status: DISCONTINUED | OUTPATIENT
Start: 2021-06-27 | End: 2021-06-29 | Stop reason: HOSPADM

## 2021-06-27 RX ORDER — ONDANSETRON 2 MG/ML
4 INJECTION INTRAMUSCULAR; INTRAVENOUS EVERY 4 HOURS PRN
Status: DISCONTINUED | OUTPATIENT
Start: 2021-06-27 | End: 2021-06-29 | Stop reason: HOSPADM

## 2021-06-27 RX ORDER — ACETAMINOPHEN 325 MG/1
650 TABLET ORAL EVERY 6 HOURS PRN
Status: DISCONTINUED | OUTPATIENT
Start: 2021-06-27 | End: 2021-06-29 | Stop reason: HOSPADM

## 2021-06-27 RX ORDER — SULFAMETHOXAZOLE AND TRIMETHOPRIM 800; 160 MG/1; MG/1
1 TABLET ORAL 2 TIMES DAILY
Status: ON HOLD | COMMUNITY
Start: 2021-05-29 | End: 2021-06-29

## 2021-06-27 RX ORDER — POLYETHYLENE GLYCOL 3350 17 G/17G
1 POWDER, FOR SOLUTION ORAL
Status: DISCONTINUED | OUTPATIENT
Start: 2021-06-27 | End: 2021-06-29 | Stop reason: HOSPADM

## 2021-06-27 RX ORDER — SODIUM CHLORIDE 9 MG/ML
INJECTION, SOLUTION INTRAVENOUS CONTINUOUS
Status: DISCONTINUED | OUTPATIENT
Start: 2021-06-27 | End: 2021-06-29 | Stop reason: HOSPADM

## 2021-06-27 RX ORDER — ONDANSETRON 4 MG/1
4 TABLET, ORALLY DISINTEGRATING ORAL EVERY 4 HOURS PRN
Status: DISCONTINUED | OUTPATIENT
Start: 2021-06-27 | End: 2021-06-29 | Stop reason: HOSPADM

## 2021-06-27 RX ORDER — SODIUM CHLORIDE 9 MG/ML
1000 INJECTION, SOLUTION INTRAVENOUS ONCE
Status: COMPLETED | OUTPATIENT
Start: 2021-06-27 | End: 2021-06-27

## 2021-06-27 RX ORDER — BISACODYL 10 MG
10 SUPPOSITORY, RECTAL RECTAL
Status: DISCONTINUED | OUTPATIENT
Start: 2021-06-27 | End: 2021-06-29 | Stop reason: HOSPADM

## 2021-06-27 RX ADMIN — HEPARIN SODIUM 5000 UNITS: 5000 INJECTION, SOLUTION INTRAVENOUS; SUBCUTANEOUS at 21:14

## 2021-06-27 RX ADMIN — MINERAL OIL, PETROLATUM 2 APPLICATION: 425; 568 OINTMENT OPHTHALMIC at 21:00

## 2021-06-27 RX ADMIN — SODIUM CHLORIDE 1000 ML: 9 INJECTION, SOLUTION INTRAVENOUS at 14:44

## 2021-06-27 RX ADMIN — CEFEPIME 2 G: 2 INJECTION, POWDER, FOR SOLUTION INTRAVENOUS at 15:19

## 2021-06-27 RX ADMIN — MINERAL OIL, PETROLATUM 2 APPLICATION: 425; 568 OINTMENT OPHTHALMIC at 17:30

## 2021-06-27 RX ADMIN — SODIUM CHLORIDE: 9 INJECTION, SOLUTION INTRAVENOUS at 18:04

## 2021-06-27 RX ADMIN — HEPARIN SODIUM 5000 UNITS: 5000 INJECTION, SOLUTION INTRAVENOUS; SUBCUTANEOUS at 18:11

## 2021-06-27 ASSESSMENT — FIBROSIS 4 INDEX
FIB4 SCORE: 2.32
FIB4 SCORE: 2.32

## 2021-06-27 NOTE — ED NOTES
Saline lock by this rn-ivf and meds per order. Pt remains awake, tremulous, not attempting to get oob at this time

## 2021-06-27 NOTE — ED NOTES
dtr aureliano called in for update and given. Requesting cxr as pt has fallen yesterday and c/o pain when coughing and was holding her chest, dtr thinks rt side. Update to erp, orders recvd for same

## 2021-06-27 NOTE — ED PROVIDER NOTES
ED Provider Note    CHIEF COMPLAINT  Chief Complaint   Patient presents with   • GLF   • Knee Pain     rt   • Bleeding/Bruising     left forehead       HPI  Ang Lancaster is a 86 y.o. female who presents for evaluation of worsening confusion and an apparent fall.  The patient provides no significant medical history or review of systems.  She has a history of moderate to advanced dementia.  She is at a care home for individuals with dementia.  She was recently diagnosed with urinary tract infection and started on Bactrim within the last several days.  Apparently during her transfer she had a fall struck her right knee right hip and head.  She is not on any anticoagulants or blood thinners.  REVIEW OF SYSTEMS  See HPI for further details.  Limited review of systems all other systems are negative.     PAST MEDICAL HISTORY  Past Medical History:   Diagnosis Date   • Risk for falls 10/11/2017   • Degenerative joint disease     lbp and neck pain   • [place] (hard of hearing)     rt ear with hearing aide   • IFG (impaired fasting glucose)    • Impaired fasting glucose    • Meige syndrome (blepharospasm with oromandibular dystonia)    • Osteoporosis    • Recurrent UTI    • Vertigo        FAMILY HISTORY  Noncontributory    SOCIAL HISTORY  Social History     Socioeconomic History   • Marital status:      Spouse name: Not on file   • Number of children: Not on file   • Years of education: Not on file   • Highest education level: Not on file   Occupational History   • Not on file   Tobacco Use   • Smoking status: Former Smoker     Packs/day: 1.50     Years: 45.00     Pack years: 67.50     Types: Cigarettes     Quit date: 2004     Years since quittin.4   • Smokeless tobacco: Never Used   Substance and Sexual Activity   • Alcohol use: Yes     Alcohol/week: 3.5 - 17.5 oz     Types: 7 - 35 Glasses of wine per week     Comment: Occasionally   • Drug use: Yes     Comment: CBD oil   • Sexual activity: Not on file   Other  Topics Concern   • Not on file   Social History Narrative   • Not on file     Social Determinants of Health     Financial Resource Strain:    • Difficulty of Paying Living Expenses:    Food Insecurity:    • Worried About Running Out of Food in the Last Year:    • Ran Out of Food in the Last Year:    Transportation Needs:    • Lack of Transportation (Medical):    • Lack of Transportation (Non-Medical):    Physical Activity:    • Days of Exercise per Week:    • Minutes of Exercise per Session:    Stress:    • Feeling of Stress :    Social Connections:    • Frequency of Communication with Friends and Family:    • Frequency of Social Gatherings with Friends and Family:    • Attends [employer] Services:    • Active Member of Clubs or Organizations:    • Attends Club or Organization Meetings:    • Marital Status:    Intimate Partner Violence:    • Fear of Current or Ex-Partner:    • Emotionally Abused:    • Physically Abused:    • Sexually Abused:        SURGICAL HISTORY  Past Surgical History:   Procedure Laterality Date   • HIP CANNULATED SCREW Right 5/13/2019    Procedure: FIXATION, HIP, USING CANNULATED SCREW;  Surgeon: Oleg Corona M.D.;  Location: SURGERY [facility];  Service: Orthopedics   • HIP CANNULATED SCREW Left 4/22/2018    Procedure: HIP CANNULATED SCREW;  Surgeon: Hu Galaviz M.D.;  Location: SURGERY [facility];  Service: Orthopedics   • TUBAL COAGULATION LAPAROSCOPIC BILATERAL  1973       CURRENT MEDICATIONS  Home Medications     Reviewed by Nellie Reynaga (Pharmacy Tech) on 06/27/21 at 1438  Med List Status: Complete   Medication Last Dose Status   artificial tears (EYE LUBRICANT) Ointment ophth ointment 6/27/2021 Active   diazePAM (VALIUM) 5 MG Tab 6/26/2021 Active   furosemide (LASIX) 20 MG Tab 6/27/2021 Active   potassium chloride SA (K-DUR) 10 MEQ Tab CR 6/27/2021 Active   sulfamethoxazole-trimethoprim (BACTRIM DS) 800-160 MG tablet 6/4/2021 Active                 ALLERGIES  Allergies   Allergen Reactions   • Other Environmental Runny Nose and Itching     pollens       PHYSICAL EXAM  VITAL SIGNS: /95   Pulse 84   Temp 36.9 °C (98.4 °F) (Temporal)   Resp 16   SpO2 98%   Breastfeeding No       Constitutional: Well developed, Well nourished, No acute distress, Non-toxic appearance.   HENT: Ecchymosis and bruising noted to the left forehead, Bilateral external ears normal, Oropharynx moist, No oral exudates, Nose normal.   Eyes: PERRLA, EOMI, Conjunctiva normal, No discharge.   Neck: Normal range of motion, No tenderness, Supple, No stridor.   Cardiovascular: Normal heart rate, Normal rhythm, No murmurs, No rubs, No gallops.   Thorax & Lungs: Normal breath sounds, No respiratory distress, No wheezing, No chest tenderness.   Abdomen: Bowel sounds normal, Soft, No tenderness, No masses, No pulsatile masses.   Skin: Warm, Dry, No erythema, No rash.   Back: No tenderness, No CVA tenderness.   Extremities: Bony tenderness and ecchymosis noted over the anterior right knee.  Mild pain with range of motion to the right hip without shortening   Neurological.  Moving all extremities with 5 out of 5 strength no seizure activity no focal neurological deficit.  Alert and oriented x1 due to dementia      RADIOLOGY/PROCEDURES  DX-CHEST-LIMITED (1 VIEW)   Final Result      No acute cardiopulmonary disease.         DX-HIP-COMPLETE - UNILATERAL 2+ RIGHT   Final Result      1.  There is no acute fracture or malalignment.   2.  There is postoperative change of both proximal femurs with chronic appearing degenerative changes.      DX-KNEE COMPLETE 4+ RIGHT   Final Result      1.  No fracture or dislocation of RIGHT knee.   2.  Diffuse osteopenia.      CT-HEAD W/O   Final Result      1.  Extensive atrophy and white matter changes.   2.  No acute intracranial hemorrhage or territorial infarct.           Results for orders placed or performed during the hospital encounter of 06/27/21    CBC WITH DIFFERENTIAL   Result Value Ref Range    WBC 8.9 4.8 - 10.8 K/uL    RBC 4.12 (L) 4.20 - 5.40 M/uL    Hemoglobin 13.2 12.0 - 16.0 g/dL    Hematocrit 40.9 37.0 - 47.0 %    MCV 99.3 (H) 81.4 - 97.8 fL    MCH 32.0 27.0 - 33.0 pg    MCHC 32.3 (L) 33.6 - 35.0 g/dL    RDW 48.9 35.9 - 50.0 fL    Platelet Count 224 164 - 446 K/uL    MPV 10.9 9.0 - 12.9 fL    Neutrophils-Polys 75.70 (H) 44.00 - 72.00 %    Lymphocytes 14.70 (L) 22.00 - 41.00 %    Monocytes 6.70 0.00 - 13.40 %    Eosinophils 1.60 0.00 - 6.90 %    Basophils 0.70 0.00 - 1.80 %    Immature Granulocytes 0.60 0.00 - 0.90 %    Nucleated RBC 0.00 /100 WBC    Neutrophils (Absolute) 6.77 2.00 - 7.15 K/uL    Lymphs (Absolute) 1.31 1.00 - 4.80 K/uL    Monos (Absolute) 0.60 0.00 - 0.85 K/uL    Eos (Absolute) 0.14 0.00 - 0.51 K/uL    Baso (Absolute) 0.06 0.00 - 0.12 K/uL    Immature Granulocytes (abs) 0.05 0.00 - 0.11 K/uL    NRBC (Absolute) 0.00 K/uL   Comp Metabolic Panel   Result Value Ref Range    Sodium 151 (H) 135 - 145 mmol/L    Potassium 3.7 3.6 - 5.5 mmol/L    Chloride 112 96 - 112 mmol/L    Co2 25 20 - 33 mmol/L    Anion Gap 14.0 7.0 - 16.0    Glucose 100 (H) 65 - 99 mg/dL    Bun 23 (H) 8 - 22 mg/dL    Creatinine 1.03 0.50 - 1.40 mg/dL    Calcium 8.9 8.4 - 10.2 mg/dL    AST(SGOT) 27 12 - 45 U/L    ALT(SGPT) 19 2 - 50 U/L    Alkaline Phosphatase 68 30 - 99 U/L    Total Bilirubin 0.4 0.1 - 1.5 mg/dL    Albumin 3.7 3.2 - 4.9 g/dL    Total Protein 6.7 6.0 - 8.2 g/dL    Globulin 3.0 1.9 - 3.5 g/dL    A-G Ratio 1.2 g/dL   ESTIMATED GFR   Result Value Ref Range    GFR If African American >60 >60 mL/min/1.73 m 2    GFR If Non  51 (A) >60 mL/min/1.73 m 2   URINALYSIS    Specimen: Blood   Result Value Ref Range    Color Yellow     Character Clear     Specific Gravity 1.020 <1.035    Ph 5.5 5.0 - 8.0    Glucose Trace (A) Negative mg/dL    Ketones Negative Negative mg/dL    Protein Negative Negative mg/dL    Bilirubin Negative Negative    Nitrite  Positive (A) Negative    Leukocyte Esterase Trace (A) Negative    Occult Blood Small (A) Negative    Micro Urine Req Microscopic    URINE MICROSCOPIC (W/UA)   Result Value Ref Range    WBC 20-50 (A) /hpf    RBC 2-5 (A) /hpf    Bacteria Many (A) None /hpf    Epithelial Cells Few Few /hpf    Mucous Threads Few /hpf    Hyaline Cast 3-5 (A) /lpf        COURSE & MEDICAL DECISION MAKING  Pertinent Labs & Imaging studies reviewed. (See chart for details)  An IV is established.  The patient had extensive work-up today.  CT scan of the head was performed due to head injury and advanced age which was unremarkable.  Radiograph of the right hip, right knee and chest were negative for any acute traumatic process.  Laboratory studies are normal.  I reviewed the patient's prior culture history and she did just recently have a culture positive UTI that should be susceptible to Bactrim but catheterized urine sample suggest ongoing infection.  I reviewed the findings with the ER pharmacist who is concerned given the resistance pattern.  They recommended admission for parenteral cefepime which has been ordered.  Patient will be admitted to internal medicine for further treatment after ground-level fall and complicated UTI.    FINAL IMPRESSION  1.  Closed head injury  2.  Urinary tract infection  3.  Right hip and knee contusion         Electronically signed by: Allen Evangelista M.D., 6/27/2021 12:11 PM

## 2021-06-27 NOTE — H&P
Hospital Medicine History & Physical Note    Date of Service  6/27/2021    Primary Care Physician  ABIMBOLA Maria.    Consultants  none    Code Status  DNAR, I OK  No longer     Chief Complaint  Chief Complaint   Patient presents with   • GLF   • Knee Pain     rt   • Bleeding/Bruising     left forehead     History of Presenting Illness  86 y.o. female who presented 6/27/2021 with GLF.    Patient diagnosed with UTI on 6/25/21, was on Bactrim but has shown no improvement. Patient worsened, and subsequently suffered GLF at [facility] care. Patient has severe dementia, unable to participate in questions. Plannify told daughter she had significant behavioral changes. Spoke with Daughter Anahi Lancaster, informed me patient has sun-downing issues at night, may pull out her IV lines at this current mental state. New body tremors in past 2 bed.    Baseline behavior: wandering, going through drawers, stays up at night, non-violent, redirectable, facial tremors, no body tremors.      Spoke with EDP, XR has been negative for acute fractures. He reviewed cultures from previous UTI, has sensitivity to Cefepime, resistant to other cephalosporins. Other ABX sensitive to bacteria are high risk for elderly patients.  Straight catheterized patient in ED, obtained sample for UA and Ucx.    [facility] [phone]. [phone] [id number] spoke with Nanci.   Anahi Lancaster Daughter     Review of Systems  Review of Systems   Unable to perform ROS: Mental acuity   patient has severe dementia, unable to cooperate with questions    Past Medical History   has a past medical history of Degenerative joint disease, [place] (hard of hearing), IFG (impaired fasting glucose), Impaired fasting glucose, Meige syndrome (blepharospasm with oromandibular dystonia), Osteoporosis, Recurrent UTI, Risk for falls (10/11/2017), and Vertigo.    Surgical History   has a past surgical history that includes tubal coagulation laparoscopic bilateral (1973);  hip cannulated screw (Left, 4/22/2018); and hip cannulated screw (Right, 5/13/2019).     Family History  family history includes Alcohol/Drug in her father; Diabetes in her father; Heart Disease in her paternal grandmother; Stroke in her mother.     Social History   reports that she quit smoking about 17 years ago. Her smoking use included cigarettes. She has a 67.50 pack-year smoking history. She has never used smokeless tobacco. She reports current alcohol use of about 3.5 - 17.5 oz of alcohol per week. She reports current drug use.    Allergies  Allergies   Allergen Reactions   • Other Environmental Runny Nose and Itching     pollens       Medications  Prior to Admission Medications   Prescriptions Last Dose Informant Patient Reported? Taking?   artificial tears (EYE LUBRICANT) Ointment ophth ointment 6/27/2021 at 0800 MAR from Other Facility Yes Yes   Sig: Apply 2 Applications to both eyes 4 times a day.   diazePAM (VALIUM) 5 MG Tab 6/26/2021 at 2000 MAR from Other Facility Yes No   Sig: Take 5 mg by mouth at bedtime.   furosemide (LASIX) 20 MG Tab 6/27/2021 at 0800 MAR from Other Facility Yes No   Sig: Take 20 mg by mouth every day.   potassium chloride SA (K-DUR) 10 MEQ Tab CR 6/27/2021 at 0800 MAR from Other Facility Yes No   Sig: Take 10 mEq by mouth 2 times a day.   sulfamethoxazole-trimethoprim (BACTRIM DS) 800-160 MG tablet 6/4/2021 at FINISHED MAR from Other Facility Yes Yes   Sig: Take 1 tablet by mouth 2 times a day. Pt started on 5/29/2021 for 7 day course      Facility-Administered Medications: None       Physical Exam  Temp:  [36.9 °C (98.4 °F)] 36.9 °C (98.4 °F)  Pulse:  [80-84] 84  Resp:  [16-20] 19  BP: ()/(46-95) 98/77  SpO2:  [92 %-98 %] 98 %    Physical Exam  Vitals and nursing note reviewed.   Constitutional:       General: She is in acute distress.      Appearance: She is ill-appearing.      Comments: Shaking, unclear if resting tremors   Cardiovascular:      Rate and Rhythm: Normal  rate.      Pulses: Normal pulses.      Heart sounds: Normal heart sounds. No murmur heard.     Pulmonary:      Effort: Pulmonary effort is normal. No respiratory distress.      Breath sounds: Normal breath sounds.   Abdominal:      General: Abdomen is flat. Bowel sounds are normal. There is no distension.      Palpations: Abdomen is soft.   Musculoskeletal:         General: No swelling or deformity.      Right lower leg: No edema.      Left lower leg: No edema.   Skin:     Capillary Refill: Capillary refill takes less than 2 seconds.      Coloration: Skin is pale. Skin is not jaundiced.   Neurological:      Mental Status: She is alert.      Motor: Weakness present.      Comments: Body Tremors  present   Psychiatric:         Mood and Affect: Mood normal.         Laboratory:  Recent Labs     06/27/21  1244   WBC 8.9   RBC 4.12*   HEMOGLOBIN 13.2   HEMATOCRIT 40.9   MCV 99.3*   MCH 32.0   MCHC 32.3*   RDW 48.9   PLATELETCT 224   MPV 10.9     Recent Labs     06/27/21  1244   SODIUM 151*   POTASSIUM 3.7   CHLORIDE 112   CO2 25   GLUCOSE 100*   BUN 23*   CREATININE 1.03   CALCIUM 8.9     Recent Labs     06/27/21  1244   ALTSGPT 19   ASTSGOT 27   ALKPHOSPHAT 68   TBILIRUBIN 0.4   GLUCOSE 100*         No results for input(s): NTPROBNP in the last 72 hours.      No results for input(s): TROPONINT in the last 72 hours.    Imaging:  DX-CHEST-LIMITED (1 VIEW)   Final Result      No acute cardiopulmonary disease.         DX-HIP-COMPLETE - UNILATERAL 2+ RIGHT   Final Result      1.  There is no acute fracture or malalignment.   2.  There is postoperative change of both proximal femurs with chronic appearing degenerative changes.      DX-KNEE COMPLETE 4+ RIGHT   Final Result      1.  No fracture or dislocation of RIGHT knee.   2.  Diffuse osteopenia.      CT-HEAD W/O   Final Result      1.  Extensive atrophy and white matter changes.   2.  No acute intracranial hemorrhage or territorial infarct.             Assessment/Plan:  I  anticipate this patient will require at least two midnights for appropriate medical management, necessitating inpatient admission.    * Acute encephalopathy- (present on admission)  Assessment & Plan  As per daughter, current presentation for patient is not her baseline.  Increase in behavioral disturbance seen at [facility] which is new.  Encephalopathy due to infection with UTI  -Delirium precautions  -Treating underlying condition with IV antibiotics  -Fall precautions  -Patient is on Valium for bedtime, will hold medication for 1 night, restart if improvement occurs in mental state    Fall from ground level- (present on admission)  Assessment & Plan  Patient had a ground-level fall at [facility], confirmed by Dr. Christian.  Patient likely week from her current infection and not improving on oral antibiotics  -Treating underlying condition  -PT/OT ordered    Hypernatremia- (present on admission)  Assessment & Plan  Patient sodium level 151 on admission.  Likely she is volume depleted due to her current infection and inability to obtain her own fluids.  -Patient will be on IV fluids, repeat sodium checks daily.  Patient will be on NS IV fluids, may need to be adjusted if sodium levels continue to increase.    Alzheimer's dementia with behavioral disturbance (HCC)- (present on admission)  Assessment & Plan  Patient's dementia severe, requires assistance for ADL S and IADL S.    Spoke with Daughter Anahi Lancaster, informed me patient has sun-downing issues at night, may pull out her IV lines at this current mental state. New body tremors in past 2 bed.    Baseline behavior: wandering, going through drawers, stays up at night, non-violent, redirectable, facial tremors, no body tremors.      Patient will be on fall risk precautions  At risk for pulling lines, will hold physical restraints at this time, enforce redirection with nursing    Recurrent UTI- (present on admission)  Assessment & Plan  Patient was recently  diagnosed with a UTI on 6/25/2021.  E. coli growing with ,000.  Resistant to ampicillin, cefazolin, ceftriaxone, cefuroxime, poor sensitivity to nitrofurantoin.    Bacteria was sensitive to Bactrim, which patient was discharged with but continued to decompensate and progressively worsened.    Patient does not appear septic but was having chills in the ED seen with body tremors.  This is not patient's baseline.    Bacteria is sensitive to cefepime which we will continue on this admission.  Patient was straight cathed in the ED for urine sample, pending urine cultures  IV fluid maintenance with NS    DVT prophylaxis: Heparin

## 2021-06-27 NOTE — ED TRIAGE NOTES
Pt brought in by ainsley form group home after unwitnessed glf  earlier this am. Pt oriented x 1, mumbling incomprehensibly per baseline as well as extremly [place]. letf fore head bruising noted as well as rt knee lac and bruising with same leg appearing to be shorter than left. erp to bedside, no known loc or use of blood thinners.

## 2021-06-27 NOTE — DISCHARGE PLANNING
Anticipated Discharge Disposition: Back to group home vs admission     Action: [facility] [phone]. [phone] [phone] spoke with Nanci. Anahi Daughter is supportive and  Does sometimes transport per Nanci.    Barriers to Discharge: work up in progress    Plan: Cont to eval

## 2021-06-28 LAB
ALBUMIN SERPL BCP-MCNC: 3.2 G/DL (ref 3.2–4.9)
ALBUMIN/GLOB SERPL: 1.2 G/DL
ALP SERPL-CCNC: 58 U/L (ref 30–99)
ALT SERPL-CCNC: 17 U/L (ref 2–50)
ANION GAP SERPL CALC-SCNC: 9 MMOL/L (ref 7–16)
AST SERPL-CCNC: 23 U/L (ref 12–45)
BASOPHILS # BLD AUTO: 0.8 % (ref 0–1.8)
BASOPHILS # BLD: 0.07 K/UL (ref 0–0.12)
BILIRUB SERPL-MCNC: 0.7 MG/DL (ref 0.1–1.5)
BUN SERPL-MCNC: 18 MG/DL (ref 8–22)
CALCIUM SERPL-MCNC: 8.3 MG/DL (ref 8.4–10.2)
CHLORIDE SERPL-SCNC: 114 MMOL/L (ref 96–112)
CO2 SERPL-SCNC: 25 MMOL/L (ref 20–33)
CREAT SERPL-MCNC: 0.93 MG/DL (ref 0.5–1.4)
EOSINOPHIL # BLD AUTO: 0.18 K/UL (ref 0–0.51)
EOSINOPHIL NFR BLD: 2.1 % (ref 0–6.9)
ERYTHROCYTE [DISTWIDTH] IN BLOOD BY AUTOMATED COUNT: 47.7 FL (ref 35.9–50)
GLOBULIN SER CALC-MCNC: 2.7 G/DL (ref 1.9–3.5)
GLUCOSE SERPL-MCNC: 102 MG/DL (ref 65–99)
HCT VFR BLD AUTO: 38 % (ref 37–47)
HGB BLD-MCNC: 12.3 G/DL (ref 12–16)
IMM GRANULOCYTES # BLD AUTO: 0.05 K/UL (ref 0–0.11)
IMM GRANULOCYTES NFR BLD AUTO: 0.6 % (ref 0–0.9)
LYMPHOCYTES # BLD AUTO: 1.38 K/UL (ref 1–4.8)
LYMPHOCYTES NFR BLD: 16.1 % (ref 22–41)
MAGNESIUM SERPL-MCNC: 2.1 MG/DL (ref 1.5–2.5)
MCH RBC QN AUTO: 32 PG (ref 27–33)
MCHC RBC AUTO-ENTMCNC: 32.4 G/DL (ref 33.6–35)
MCV RBC AUTO: 99 FL (ref 81.4–97.8)
MONOCYTES # BLD AUTO: 0.66 K/UL (ref 0–0.85)
MONOCYTES NFR BLD AUTO: 7.7 % (ref 0–13.4)
NEUTROPHILS # BLD AUTO: 6.24 K/UL (ref 2–7.15)
NEUTROPHILS NFR BLD: 72.7 % (ref 44–72)
NRBC # BLD AUTO: 0 K/UL
NRBC BLD-RTO: 0 /100 WBC
PHOSPHATE SERPL-MCNC: 2.5 MG/DL (ref 2.5–4.5)
PLATELET # BLD AUTO: 206 K/UL (ref 164–446)
PMV BLD AUTO: 10.8 FL (ref 9–12.9)
POTASSIUM SERPL-SCNC: 3.6 MMOL/L (ref 3.6–5.5)
PROT SERPL-MCNC: 5.9 G/DL (ref 6–8.2)
RBC # BLD AUTO: 3.84 M/UL (ref 4.2–5.4)
SODIUM SERPL-SCNC: 148 MMOL/L (ref 135–145)
WBC # BLD AUTO: 8.6 K/UL (ref 4.8–10.8)

## 2021-06-28 PROCEDURE — 84100 ASSAY OF PHOSPHORUS: CPT

## 2021-06-28 PROCEDURE — 700102 HCHG RX REV CODE 250 W/ 637 OVERRIDE(OP): Performed by: INTERNAL MEDICINE

## 2021-06-28 PROCEDURE — 85025 COMPLETE CBC W/AUTO DIFF WBC: CPT

## 2021-06-28 PROCEDURE — 97166 OT EVAL MOD COMPLEX 45 MIN: CPT

## 2021-06-28 PROCEDURE — 83735 ASSAY OF MAGNESIUM: CPT

## 2021-06-28 PROCEDURE — A9270 NON-COVERED ITEM OR SERVICE: HCPCS | Performed by: INTERNAL MEDICINE

## 2021-06-28 PROCEDURE — 92610 EVALUATE SWALLOWING FUNCTION: CPT

## 2021-06-28 PROCEDURE — 99232 SBSQ HOSP IP/OBS MODERATE 35: CPT | Performed by: INTERNAL MEDICINE

## 2021-06-28 PROCEDURE — 97162 PT EVAL MOD COMPLEX 30 MIN: CPT

## 2021-06-28 PROCEDURE — 700111 HCHG RX REV CODE 636 W/ 250 OVERRIDE (IP): Performed by: INTERNAL MEDICINE

## 2021-06-28 PROCEDURE — 770006 HCHG ROOM/CARE - MED/SURG/GYN SEMI*

## 2021-06-28 PROCEDURE — 80053 COMPREHEN METABOLIC PANEL: CPT

## 2021-06-28 PROCEDURE — 700105 HCHG RX REV CODE 258: Performed by: INTERNAL MEDICINE

## 2021-06-28 PROCEDURE — 36415 COLL VENOUS BLD VENIPUNCTURE: CPT

## 2021-06-28 RX ORDER — HALOPERIDOL 5 MG/ML
2-5 INJECTION INTRAMUSCULAR EVERY 4 HOURS PRN
Status: DISCONTINUED | OUTPATIENT
Start: 2021-06-28 | End: 2021-06-28

## 2021-06-28 RX ORDER — DIAZEPAM 2 MG/1
2 TABLET ORAL 2 TIMES DAILY
Status: DISCONTINUED | OUTPATIENT
Start: 2021-06-28 | End: 2021-06-29 | Stop reason: HOSPADM

## 2021-06-28 RX ADMIN — MINERAL OIL, PETROLATUM 2 APPLICATION: 425; 568 OINTMENT OPHTHALMIC at 13:00

## 2021-06-28 RX ADMIN — MINERAL OIL, PETROLATUM 2 APPLICATION: 425; 568 OINTMENT OPHTHALMIC at 17:00

## 2021-06-28 RX ADMIN — MINERAL OIL, PETROLATUM 2 APPLICATION: 425; 568 OINTMENT OPHTHALMIC at 09:00

## 2021-06-28 RX ADMIN — DIAZEPAM 2 MG: 2 TABLET ORAL at 17:43

## 2021-06-28 RX ADMIN — HEPARIN SODIUM 5000 UNITS: 5000 INJECTION, SOLUTION INTRAVENOUS; SUBCUTANEOUS at 05:07

## 2021-06-28 RX ADMIN — CEFEPIME 2 G: 2 INJECTION, POWDER, FOR SOLUTION INTRAVENOUS at 05:07

## 2021-06-28 RX ADMIN — HALOPERIDOL LACTATE 2 MG: 5 INJECTION, SOLUTION INTRAMUSCULAR at 02:24

## 2021-06-28 RX ADMIN — DOCUSATE SODIUM 50 MG AND SENNOSIDES 8.6 MG 2 TABLET: 8.6; 5 TABLET, FILM COATED ORAL at 17:43

## 2021-06-28 RX ADMIN — HEPARIN SODIUM 5000 UNITS: 5000 INJECTION, SOLUTION INTRAVENOUS; SUBCUTANEOUS at 21:58

## 2021-06-28 ASSESSMENT — PAIN SCALES - PAIN ASSESSMENT IN ADVANCED DEMENTIA (PAINAD)
TOTALSCORE: 2
BODYLANGUAGE: TENSE, DISTRESSED PACING, FIDGETING
FACIALEXPRESSION: SMILING OR INEXPRESSIVE
BREATHING: NORMAL
BREATHING: NORMAL
BODYLANGUAGE: TENSE, DISTRESSED PACING, FIDGETING
FACIALEXPRESSION: SMILING OR INEXPRESSIVE
CONSOLABILITY: DISTRACTED OR REASSURED BY VOICE/TOUCH
CONSOLABILITY: DISTRACTED OR REASSURED BY VOICE/TOUCH
TOTALSCORE: 2

## 2021-06-28 ASSESSMENT — COGNITIVE AND FUNCTIONAL STATUS - GENERAL
TOILETING: TOTAL
MOBILITY SCORE: 6
SUGGESTED CMS G CODE MODIFIER DAILY ACTIVITY: CL
EATING MEALS: A LOT
SUGGESTED CMS G CODE MODIFIER MOBILITY: CN
DRESSING REGULAR UPPER BODY CLOTHING: A LOT
PERSONAL GROOMING: A LOT
TURNING FROM BACK TO SIDE WHILE IN FLAT BAD: UNABLE
STANDING UP FROM CHAIR USING ARMS: TOTAL
DAILY ACTIVITIY SCORE: 9
DRESSING REGULAR LOWER BODY CLOTHING: TOTAL
MOVING TO AND FROM BED TO CHAIR: UNABLE
CLIMB 3 TO 5 STEPS WITH RAILING: TOTAL
MOVING FROM LYING ON BACK TO SITTING ON SIDE OF FLAT BED: UNABLE
WALKING IN HOSPITAL ROOM: TOTAL
HELP NEEDED FOR BATHING: TOTAL

## 2021-06-28 ASSESSMENT — GAIT ASSESSMENTS: GAIT LEVEL OF ASSIST: UNABLE TO PARTICIPATE

## 2021-06-28 NOTE — CARE PLAN
The patient is Watcher - Medium risk of patient condition declining or worsening    Shift Goals  Clinical Goals: Comfort, safety  Patient Goals: APOORVA    Progress made toward(s) clinical / shift goals:  Patient is not progressing towards goals.     Patient is not progressing towards the following goals:      Problem: Knowledge Deficit - Standard  Goal: Patient and family/care givers will demonstrate understanding of plan of care, disease process/condition, diagnostic tests and medications  Patient does not demonstrate appropriate knowledge of disease process/condition, treatment plan, diagnostic tests, and medications. Patient is confused and non-compliant   Outcome: Not Progressing     Problem: Communication  Goal: The ability to communicate needs accurately and effectively will improve  Patient does not communicate effectively  Outcome: Not Progressing     Problem: Fall Risk  Goal: Patient will remain free from falls  Safety measures in place, bed in lowest position, side rails up x2, bed alarm on, call light in place, patient is free from falls at this time.   Outcome: Progressing

## 2021-06-28 NOTE — PROGRESS NOTES
Received report, assumed pt care. Pt found to have removed mitts and trying to pull out IV. Repositioned pt in bed and mitts replaced. Attempted to orient pt unsuccessful. VSS. Will cont to monitor.   Intact

## 2021-06-28 NOTE — ASSESSMENT & PLAN NOTE
Patient had a ground-level fall at [employer] [facility] [employer], confirmed by Dr. Christian.  Patient likely week from her current infection and not improving on oral antibiotics  -Treating underlying condition  -PT/OT ordered

## 2021-06-28 NOTE — PROGRESS NOTES
4 Eyes Skin Assessment Completed by LINWOOD Fraser and LINWOOD Plascencia.    Head Bruising and Redness  Ears WDL  Nose WDL  Mouth WDL  Neck WDL  Breast/Chest Redness, Abrasion and Bruising  Shoulder Blades WDL  Spine WDL  (R) Arm/Elbow/Hand Redness, Bruising, Abrasion and Scab  (L) Arm/Elbow/Hand Redness, Bruising and Abrasion  Abdomen WDL  Groin WDL  Scrotum/Coccyx/Buttocks WDL  (R) Leg Redness, Bruising and Abrasion  (L) Leg Bruising  (R) Heel/Foot/Toe Swelling  (L) Heel/Foot/Toe Swelling          Devices In Places Blood Pressure Cuff and Pulse Ox      Interventions In Place Pressure Redistribution Mattress    Possible Skin Injury No    Pictures Uploaded Into Epic N/A  Wound Consult Placed N/A  RN Wound Prevention Protocol Ordered No

## 2021-06-28 NOTE — HOSPITAL COURSE
"Per notes, \"86 y.o. female who presented 6/27/2021 with GLF.     Patient diagnosed with UTI on 6/25/21, was on Bactrim but has shown no improvement. Patient worsened, and subsequently suffered GLF at memory care. Patient has severe dementia, unable to participate in questions. Hobzy told daughter she had significant behavioral changes. Spoke with Daughter Anahi Lancaster, informed me patient has sun-downing issues at night, may pull out her IV lines at this current mental state. New body tremors in past 2 bed.     Baseline behavior: wandering, going through drawers, stays up at night, non-violent, redirectable, facial tremors, no body tremors.       Spoke with EDP, XR has been negative for acute fractures. He reviewed cultures from previous UTI, has sensitivity to Cefepime, resistant to other cephalosporins. Other ABX sensitive to bacteria are high risk for elderly patients.  Straight catheterized patient in ED, obtained sample for UA and Ucx.     [facility] [phone].   [id number] [id number] spoke with Nanci.   Anahi Lancaster Daughter \"  "

## 2021-06-28 NOTE — ASSESSMENT & PLAN NOTE
Patient was recently diagnosed with a UTI on 6/25/2021.  E. coli growing with ,000.  Resistant to ampicillin, cefazolin, ceftriaxone, cefuroxime, poor sensitivity to nitrofurantoin.    Bacteria was sensitive to Bactrim, which patient was discharged with but continued to decompensate and progressively worsened.   Patient does not appear septic but was having chills in the ED seen with body tremors.  This is not patient's baseline.      Patient is now on cefepime, will continue based on antibiogram, will need 3 days of therapy, and date of 6/29.  Patient was straight cathed in the ED for urine sample, pending urine cultures  IV fluid maintenance with NS

## 2021-06-28 NOTE — THERAPY
Physical Therapy   Initial Evaluation     Patient Name: Ang Lancaster  Age:  86 y.o., Sex:  female  Medical Record #: [id number]  Today's Date: 6/28/2021     Precautions: Fall Risk    Assessment  Patient is 86 y.o. female with a diagnosis of UTI, encephalopathy.  Pt is currently more confused than baseline, follows commands and responds approp to commands inconsistently. Pt is presenting with debility and impaired balance, unable to ambulate or transfer OOB today, required maxA for balance.  If pts function improves as UTI clears may be able to DC back to  however since currently pts mobility is far from baseline, may need SNF.  Plan    Recommend Physical Therapy 3 times per week until therapy goals are met for the following treatments:  Bed Mobility, Gait Training, Neuro Re-Education / Balance, Therapeutic Activities and Therapeutic Exercises    DC Equipment Recommendations: Unable to determine at this time  Discharge Recommendations: Recommend post-acute placement for additional physical therapy services prior to discharge home vs back to  with HHPT- pending progress.           06/28/21 1018   Prior Living Situation   Housing / Facility Group Home  (memory care)   Equipment Owned Front-Wheel Walker   Comments Dtr reports that pt is [place] and confused at baseline however currently is worse than normal   Prior Level of Functional Mobility   Bed Mobility Independent   Transfer Status Independent   Ambulation Independent   Assistive Devices Used Front-Wheel Walker   History of Falls   History of Falls Yes   Date of Last Fall 06/27/21   Cognition    Level of Consciousness Responds to voice   Comments Pt is very [place], able to read and follow commands via white board however this is inconsistent, speech has fast and garbled   Passive ROM Lower Body   Passive ROM Lower Body WDL   Active ROM Lower Body    Active ROM Lower Body  WDL   Strength Lower Body   Lower Body Strength  Not Tested   Comments Pt not following commands  to allow PT to formally assess strength. Pt is able to move B LE against gravity   Sensation Lower Body   Lower Extremity Sensation   Not Tested   Balance Assessment   Sitting Balance (Static) Poor +   Sitting Balance (Dynamic) Poor   Standing Balance (Static) Trace +   Standing Balance (Dynamic) Dependent   Weight Shift Sitting Poor   Weight Shift Standing Poor   Comments posterior lean in sitting and standing   Gait Analysis   Gait Level Of Assist Unable to Participate   Bed Mobility    Supine to Sit Maximal Assist   Sit to Supine Maximal Assist   Functional Mobility   Sit to Stand Maximal Assist   Bed, Chair, Wheelchair Transfer Unable to Participate   Activity Tolerance   Sitting Edge of Bed 10 min   Standing 30 sec with FWW maxA   Short Term Goals    Short Term Goal # 1 Pt will be able to perform bed mobility and sup <> sit Anthony in 6 visits.   Short Term Goal # 2 Pt will be able to perform sit <> stand and transfer Conchita in 6 visits so can progress OOB tolerance daily.   Short Term Goal # 3 Pt will be able to ambulate 100 ft with FWW Conchita in 6 visits so can DC home safely,

## 2021-06-28 NOTE — PROGRESS NOTES
Patient continues to be extremely agitated and confused.  Pulled out IV, removed all clothing, attempting to get out of bed.  Will notify MD. Will continue to monitor, bed rails up, bed alarm on.

## 2021-06-28 NOTE — ASSESSMENT & PLAN NOTE
As per daughter, current presentation for patient is not her baseline.  Increase in behavioral disturbance seen at [facility] which is new.  Encephalopathy due to infection with UTI  -Delirium precautions  -Treating underlying condition with IV antibiotics  -Fall precautions  -Patient is on Valium half a pill twice daily as a home medication, her daughter and takes consistently, we will restart when she is having significant agitation is could be withdraw.  -Avoid Haldol, if still having agitation might consider low dose Seroquel as it has less long-term side effects for elderly with dementia.

## 2021-06-28 NOTE — CARE PLAN
Problem: Skin Integrity  Goal: Skin integrity is maintained or improved  Outcome: Progressing  Note: Monitoring pressure points and incontinence, using barrier paste and repositioning as appropriate     Problem: Fall Risk  Goal: Patient will remain free from falls  Outcome: Progressing  Note: Bed in low position, bed alarm on, and call light in reach.

## 2021-06-28 NOTE — ASSESSMENT & PLAN NOTE
Patient's dementia severe, requires assistance for ADL S and IADL S.    Spoke with Daughter Anahi Lancaster, informed me patient has sun-downing issues at night, may pull out her IV lines at this current mental state. New body tremors in past 2 bed.    Baseline behavior: wandering, going through drawers, stays up at night, non-violent, redirectable, facial tremors, no body tremors.      Patient will be on fall risk precautions  At risk for pulling lines, will hold physical restraints at this time, enforce redirection with nursing

## 2021-06-28 NOTE — ASSESSMENT & PLAN NOTE
Patient sodium level 151 on admission.  Likely she is volume depleted due to her current infection and inability to obtain her own fluids.  -Patient will be on IV fluids, repeat sodium checks daily.    Patient will be on NS IV fluids, may need to be adjusted if sodium levels continue to increase.

## 2021-06-28 NOTE — PROGRESS NOTES
Pt found to have removed mittens and IV. IV site dressed and pt repositioned, bathed and mittens left off as MD states no IV needed at this time.

## 2021-06-28 NOTE — PROGRESS NOTES
"Patient extremely agitated, keeps saying \"don't hurt me, don't hurt me anymore\" MD notified, 2mg IM Haldol administered.  Will continue to monitor.  "

## 2021-06-28 NOTE — PROGRESS NOTES
"Hospital Medicine Daily Progress Note    Date of Service  6/28/2021    Chief Complaint  86 y.o. female admitted 6/27/2021 with altered mental status    Hospital Course  Per notes, \"86 y.o. female who presented 6/27/2021 with GLF.     Patient diagnosed with UTI on 6/25/21, was on Bactrim but has shown no improvement. Patient worsened, and subsequently suffered GLF at [facility]. Patient has severe dementia, unable to participate in questions. [employer] told daughter she had significant behavioral changes. Spoke with Daughter Anahi Lancaster, informed me patient has sun-downing issues at night, may pull out her IV lines at this current mental state. New body tremors in past 2 bed.     Baseline behavior: wandering, going through drawers, stays up at night, non-violent, redirectable, facial tremors, no body tremors.       Spoke with EDP, XR has been negative for acute fractures. He reviewed cultures from previous UTI, has sensitivity to Cefepime, resistant to other cephalosporins. Other ABX sensitive to bacteria are high risk for elderly patients.  Straight catheterized patient in ED, obtained sample for UA and Ucx.     [employer] [phone]. [id number] [id number] spoke with Nanci.   Anahi Lancaster Daughter \"      Interval Problem Update  Seen examined me this morning, patient delirious, unable to participate in exam or discussion.  She does have dementia at baseline, I did speak with patient's daughter, currently not at baseline.    Per patient's daughter, she does get delirium when she is in the hospital and ICU she is safely able to be discharged, she would like her to go back to group home.  She is improved tomorrow, 6/29, may be able to return home to group home after 3 days of antibiotic therapy for UTI.  I did mention if she does not improve may need skilled nursing facility, discussed with case management.  Will confirm the patient will be able to go to group home after medically " cleared.    Consultants/Specialty  None    Code Status  DNAR, I OK    Disposition  Anticipated discharge back to group home in 24H    Review of Systems  Review of Systems   Unable to perform ROS: Dementia        Physical Exam  Temp:  [36.6 °C (97.9 °F)-36.9 °C (98.4 °F)] 36.6 °C (97.9 °F)  Pulse:  [71-82] 77  Resp:  [18-20] 20  BP: ()/(54-80) 115/54  SpO2:  [97 %-98 %] 97 %    Physical Exam  Vitals and nursing note reviewed.   Constitutional:       Appearance: Normal appearance.      Comments: Disoriented and agitated   Cardiovascular:      Rate and Rhythm: Normal rate and regular rhythm.      Pulses: Normal pulses.      Heart sounds: Normal heart sounds.   Pulmonary:      Effort: Pulmonary effort is normal.      Breath sounds: Normal breath sounds.   Abdominal:      General: Abdomen is flat. Bowel sounds are normal.      Palpations: Abdomen is soft.   Musculoskeletal:      Right lower leg: No edema.      Left lower leg: No edema.   Skin:     General: Skin is warm and dry.   Neurological:      Mental Status: She is disoriented.         Fluids  No intake or output data in the 24 hours ending 06/28/21 1449    Laboratory  Recent Labs     06/27/21  1244 06/28/21  0425   WBC 8.9 8.6   RBC 4.12* 3.84*   HEMOGLOBIN 13.2 12.3   HEMATOCRIT 40.9 38.0   MCV 99.3* 99.0*   MCH 32.0 32.0   MCHC 32.3* 32.4*   RDW 48.9 47.7   PLATELETCT 224 206   MPV 10.9 10.8     Recent Labs     06/27/21  1244 06/28/21  0425   SODIUM 151* 148*   POTASSIUM 3.7 3.6   CHLORIDE 112 114*   CO2 25 25   GLUCOSE 100* 102*   BUN 23* 18   CREATININE 1.03 0.93   CALCIUM 8.9 8.3*                   Imaging  DX-CHEST-LIMITED (1 VIEW)   Final Result      No acute cardiopulmonary disease.         DX-HIP-COMPLETE - UNILATERAL 2+ RIGHT   Final Result      1.  There is no acute fracture or malalignment.   2.  There is postoperative change of both proximal femurs with chronic appearing degenerative changes.      DX-KNEE COMPLETE 4+ RIGHT   Final Result      1.   No fracture or dislocation of RIGHT knee.   2.  Diffuse osteopenia.      CT-HEAD W/O   Final Result      1.  Extensive atrophy and white matter changes.   2.  No acute intracranial hemorrhage or territorial infarct.              Assessment/Plan  * Acute encephalopathy- (present on admission)  Assessment & Plan  As per daughter, current presentation for patient is not her baseline.  Increase in behavioral disturbance seen at [facility] which is new.  Encephalopathy due to infection with UTI  -Delirium precautions  -Treating underlying condition with IV antibiotics  -Fall precautions  -Patient is on Valium half a pill twice daily as a home medication, her daughter and takes consistently, we will restart when she is having significant agitation is could be withdraw.  -Avoid Haldol, if still having agitation might consider low dose Seroquel as it has less long-term side effects for elderly with dementia.    Fall from ground level- (present on admission)  Assessment & Plan  Patient had a ground-level fall at [facility], confirmed by Dr. Christian.  Patient likely week from her current infection and not improving on oral antibiotics  -Treating underlying condition  -PT/OT ordered    Hypernatremia- (present on admission)  Assessment & Plan  Patient sodium level 151 on admission.  Likely she is volume depleted due to her current infection and inability to obtain her own fluids.  -Patient will be on IV fluids, repeat sodium checks daily.    Patient will be on NS IV fluids, may need to be adjusted if sodium levels continue to increase.    Alzheimer's dementia with behavioral disturbance (HCC)- (present on admission)  Assessment & Plan  Patient's dementia severe, requires assistance for ADL S and IADL S.    Spoke with Daughter Anahi Lancaster, informed me patient has sun-downing issues at night, may pull out her IV lines at this current mental state. New body tremors in past 2 bed.    Baseline behavior: wandering, going  through drawers, stays up at night, non-violent, redirectable, facial tremors, no body tremors.      Patient will be on fall risk precautions  At risk for pulling lines, will hold physical restraints at this time, enforce redirection with nursing    Recurrent UTI- (present on admission)  Assessment & Plan  Patient was recently diagnosed with a UTI on 6/25/2021.  E. coli growing with ,000.  Resistant to ampicillin, cefazolin, ceftriaxone, cefuroxime, poor sensitivity to nitrofurantoin.    Bacteria was sensitive to Bactrim, which patient was discharged with but continued to decompensate and progressively worsened.   Patient does not appear septic but was having chills in the ED seen with body tremors.  This is not patient's baseline.      Patient is now on cefepime, will continue based on antibiogram, will need 3 days of therapy, and date of 6/29.  Patient was straight cathed in the ED for urine sample, pending urine cultures  IV fluid maintenance with NS       VTE prophylaxis: Sds

## 2021-06-28 NOTE — PROGRESS NOTES
Patient extremely confused and agitated.  Continues to undress and attempt to get out of bed. Side rails up, bed alarm on, frequent monitoring.

## 2021-06-28 NOTE — PROGRESS NOTES
Received report from ER RN. Assumed pt care.  Pt is AAOx1, no signs of distress, confuse, unable to obtain admission profile, no family present. Unable to perform assesment because the pt is confuse and unable to cooperate with questions. Fall precautions in place, treaded socks on pt, bed in low position. Call light within reach. Educated pt to call if needing anything.

## 2021-06-28 NOTE — THERAPY
Occupational Therapy   Initial Evaluation     Patient Name: Ang Lancaster  Age:  86 y.o., Sex:  female  Medical Record #: [id number]  Today's Date: 6/28/2021     Precautions  Precautions: Fall Risk    Assessment  Patient is 86 y.o. female with a diagnosis of GLF, bruise to R knee and L forehead.  Found to have UTI.  Pt had h/o dementia, lives in a memory care group home where she is normally ambulatory with a FWW.  Per notes, she wanders but usually can be redirected.  Unclear PLOF with self care tasks. She is very [place], deaf in L ear.  Dtr states she responds to written directions better most times.  Currently pt req maxA for bed mobility and 2 person assist to try and stand with FWW.  She is leaning posteriorly in standing, unable to achieve upright to try and walk.  Requiring assist for all self care tasks at this time.  If pt clears with UTI and progresses with mobility, she may be able to return to  with  followup.  If not, SNF for further inpt therapy may be indicated.  OT will follow while in house.      Plan    Recommend Occupational Therapy 3 times per week until therapy goals are met for the following treatments:  Adaptive Equipment, Neuro Re-Education / Balance, Self Care/Activities of Daily Living, Therapeutic Activities and Therapeutic Exercises.    DC Equipment Recommendations: Unable to determine at this time  Discharge Recommendations: Recommend post-acute placement for additional occupational therapy services prior to discharge home (vs  depending on progress while in house)       Prior Living Situation   Prior Services [facility]   Housing / Facility Group Home  (Memory Care)   Equipment Owned Front-Wheel Walker   Lives with - Patient's Self Care Capacity Unrelated Adult   Prior Level of ADL Function   Comments unable to determine   Prior Level of IADL Function   Medication Management Requires Assist   Laundry Requires Assist   Kitchen Mobility Requires Assist   Finances  "Requires Assist   Home Management Requires Assist   Shopping Requires Assist   Prior Level Of Mobility Supervision With Device in Home  (FWW)   Driving / Transportation Relatives / Others Provide Transportation   Occupation (Pre-Hospital Vocational) Retired Due To Age   History of Falls   History of Falls Yes   Date of Last Fall 06/27/21  (reason for Admit, GLF)   Precautions   Precautions Fall Risk   Vitals   O2 Delivery Device None - Room Air   Cognition    Cognition / Consciousness X   Speech/ Communication Hard of Hearing  (can only hear out of R ear)   Level of Consciousness Responds to voice   Ability To Follow Commands Unable to Follow 1 Step Commands  (inconsistent, seems to do better with written directions)   Safety Awareness Impaired;Impulsive   Initiation Impaired   Comments Dtr present for part of eval, brought a white board to help communicate with pt.  She states pt cannot hear well, and only out of R ear.  She did follow verbal commands intermittently with Therapist, was able to intitiate standing with written cue given  by daughter.  Pt mumbling, mostly incomprehensible, some words are clear like \"blow my nose\".  Mouth was very dry, moistened with swabs and pt was able to verbalize a bit better but also noted to be coughing with that tiny amount on water from swab   Active ROM Upper Body   Active ROM Upper Body  WDL   Dominant Hand Right   Strength Upper Body   Upper Body Strength  WDL   Coordination Upper Body   Coordination X   Comments full body tremors noted, daughter states this is not her norm, possibly pt shivering as it is cold in the room   Balance Assessment   Sitting Balance (Static) Fair   Sitting Balance (Dynamic) Poor +   Standing Balance (Static) Trace   Standing Balance (Dynamic) Trace   Weight Shift Sitting Poor   Weight Shift Standing Absent   Comments with FWW   Bed Mobility    Supine to Sit Maximal Assist   Sit to Supine Maximal Assist   Scooting Moderate Assist   ADL Assessment "   Eating Minimal Assist  (swab mouth)   Grooming Minimal Assist;Moderate Assist  (wiping tongue with paper towel)   Lower Body Dressing Maximal Assist   Toileting Total Assist   Comments Dtr reports pt is incontinent, wears pads at home   Functional Mobility   Sit to Stand Maximal Assist  (X2 for safety)   Bed, Chair, Wheelchair Transfer Unable to Participate   Mobility stood EOB only, heavy posterior lean   Visual Perception   Visual Perception  WDL   Comments appears intact   Edema / Skin Assessment   Comments R knee with bruising, LLE looks more swollen than RLE, Bruise to R uppper forehead   Activity Tolerance   Sitting Edge of Bed 8 min   Standing 1 min   Patient / Family Goals   Patient / Family Goal #1 dtr wants return to [facility]   Short Term Goals   Short Term Goal # 1 Pt will dress with Conchita in 6 visits   Short Term Goal # 2 pt will groom at sink with Conchita  8 min standing in 6 visits   Short Term Goal # 3 Pt will toilet in BR with Conchita in 6 visits

## 2021-06-28 NOTE — THERAPY
"Speech Language Pathology   Clinical Swallow Evaluation     Patient Name: Ang Lancaster  AGE:  86 y.o., SEX:  female  Medical Record #: [id number]  Today's Date: 6/28/2021     Precautions: Fall Risk, Swallow Precautions (See Comments)  Comments: hx of advanced dementia    Assessment    Per H&P, Patient is 86 y.o. female admitted to [facility] \"with UTI on 6/25/21, was on Bactrim but has shown no improvement. Patient worsened, and subsequently suffered GLF at memory care. Patient has severe dementia, unable to participate in questions. [employer] told daughter she had significant behavioral changes. Spoke with Daughter Anahi Lancaster, informed me patient has sun-downing issues at night, may pull out her IV lines at this current mental state. New body tremors in past 2 bed. Baseline behavior: wandering, going through drawers, stays up at night, non-violent, redirectable, facial tremors, no body tremors.\" CXR: No acute cardiopulmonary disease. No SLP hx on file.     Pt was seen today for CSE. Pt was found angled sideways in bed; however, was easily repositioned fully upright. She was unable to follow directives for oral mech exam and/or provide case hx information. Pt with tangential, nonsensical statements throughout exam and initially was resistant to participate but eventually consumed PO trials of thin liquids (water, soda), thickened liquids (water with lemon), purees, and soft solids (macaroni and cheese, zucchini.) Pt demonstrated intermittent coughing after the swallow for thin liquids which is concerning for possible aspiration/penetration. For thickened liquids, symptoms resolved and no further coughing was appreciated; however, she described the liquids as \"slimy.\" Pt consumed pureed pears with appropriate oral phase and no coughing/choking; however, for soft solids she demonstrated prolonged/disorganized mastication, attempted to talk with bolus in mouth and had wet/gurgly vocal quality and throat clearing after " the swallow for 75% of trials. Pt declined sulaiman crackers and requested to stop.    At this time, recommend PO diet of pureed solids, mildly thick liquids and meds crushed in puree, given 1:1 feeding. Please hold PO if any difficulties/concerns or change in status. SLP following. Thank you.     Plan    Recommend Speech Therapy 3 times per week until therapy goals are met for the following treatments:  Dysphagia Training and Patient / Family / Caregiver Education.    Discharge Recommendations: Recommend post-acute placement for additional speech therapy services prior to discharge home    Objective     06/28/21 1414   Prior Level Of Function   Communication Impaired   Swallow Unknown   Dentition Poor Quality    Oral Motor Eval    Is Patient Able to Complete Oral Motor Eval No   Barriers To Oral Feeding   Barriers To Oral Feeding Impaired Cognition / Attention;Oral Defensiveness   Pre-Feeding Oral Stimulation Trial Intact   Laryngeal Function   Voice Quality Within Functional Limits   Volutional Cough Within Functional Limits   Excursion Upon Swallow Complete   Oral Food Presentation   Single Swallow Mildly Thick (2) - (Nectar Thick)  Within Functional Limits   Serial Swallow Mildly Thick (2) - (Nectar Thick) Within Functional Limits   Single Swallow Thin (0) Minimal   Serial Swallow Thin (0) Moderate   Pureed (4) Within Functional Limits   Soft & Bite-Sized (6) - (Dysphagia III) Minimal   Self Feeding Needs Total Assistance   Dysphagia Strategies / Recommendations   Compensatory Strategies Strict 1:1 Feeding;Head of Bed 90 Degrees During Eating / Drinking;Single Sips / Bites;Alternate Solids / Liquids;No Talking During Eating / Drinking   Diet / Liquid Recommendation Soft & Bite-Sized (6) - (Dysphagia III);Thin (0)   Medication Administration  Crush all Medications in Puree   Therapy Interventions Dysphagia Therapy By Speech Language Pathologist   Short Term Goals   Short Term Goal # 1 Pt will consume PO diet  PU4/MT2 with no clinical s/sx of aspiration/penetration.   Education Group   Education Provided Dysphagia   Dysphagia Patient Response Patient;Acceptance;Explanation;Action Demonstration;Reinforcement Needed   Problem List   Problem List Dysphagia;Dementia

## 2021-06-29 VITALS
OXYGEN SATURATION: 98 % | RESPIRATION RATE: 19 BRPM | WEIGHT: 127.87 LBS | BODY MASS INDEX: 21.3 KG/M2 | SYSTOLIC BLOOD PRESSURE: 121 MMHG | DIASTOLIC BLOOD PRESSURE: 55 MMHG | HEIGHT: 65 IN | TEMPERATURE: 97.7 F | HEART RATE: 69 BPM

## 2021-06-29 PROCEDURE — 700102 HCHG RX REV CODE 250 W/ 637 OVERRIDE(OP): Performed by: INTERNAL MEDICINE

## 2021-06-29 PROCEDURE — 700105 HCHG RX REV CODE 258: Performed by: INTERNAL MEDICINE

## 2021-06-29 PROCEDURE — A9270 NON-COVERED ITEM OR SERVICE: HCPCS | Performed by: INTERNAL MEDICINE

## 2021-06-29 PROCEDURE — 99239 HOSP IP/OBS DSCHRG MGMT >30: CPT | Performed by: INTERNAL MEDICINE

## 2021-06-29 PROCEDURE — 700111 HCHG RX REV CODE 636 W/ 250 OVERRIDE (IP): Performed by: INTERNAL MEDICINE

## 2021-06-29 RX ORDER — CIPROFLOXACIN 500 MG/1
500 TABLET, FILM COATED ORAL 2 TIMES DAILY
Qty: 8 TABLET | Refills: 0 | Status: SHIPPED | OUTPATIENT
Start: 2021-06-29 | End: 2021-07-03

## 2021-06-29 RX ADMIN — HEPARIN SODIUM 5000 UNITS: 5000 INJECTION, SOLUTION INTRAVENOUS; SUBCUTANEOUS at 09:09

## 2021-06-29 RX ADMIN — DIAZEPAM 2 MG: 2 TABLET ORAL at 09:09

## 2021-06-29 RX ADMIN — MINERAL OIL, PETROLATUM 2 APPLICATION: 425; 568 OINTMENT OPHTHALMIC at 09:00

## 2021-06-29 RX ADMIN — CEFEPIME 2 G: 2 INJECTION, POWDER, FOR SOLUTION INTRAVENOUS at 06:19

## 2021-06-29 ASSESSMENT — PAIN SCALES - PAIN ASSESSMENT IN ADVANCED DEMENTIA (PAINAD)
CONSOLABILITY: DISTRACTED OR REASSURED BY VOICE/TOUCH
TOTALSCORE: 2
BREATHING: NORMAL
BREATHING: NORMAL
FACIALEXPRESSION: SMILING OR INEXPRESSIVE
CONSOLABILITY: NO NEED TO CONSOLE
BODYLANGUAGE: TENSE, DISTRESSED PACING, FIDGETING
FACIALEXPRESSION: SMILING OR INEXPRESSIVE
BODYLANGUAGE: TENSE, DISTRESSED PACING, FIDGETING
TOTALSCORE: 1

## 2021-06-29 ASSESSMENT — PAIN DESCRIPTION - PAIN TYPE: TYPE: ACUTE PAIN

## 2021-06-29 NOTE — DISCHARGE INSTRUCTIONS
Discharge Instructions    Discharged to group home by ambulance with escort. Discharged via ambulance, hospital escort: Yes.  Special equipment needed: Not Applicable    Be sure to schedule a follow-up appointment with your primary care doctor or any specialists as instructed.     Discharge Plan:   Diet Plan: Discussed  Activity Level: Discussed  Confirmed Follow up Appointment: Patient to Call and Schedule Appointment  Confirmed Symptoms Management: Discussed  Medication Reconciliation Updated: Yes    I understand that a diet low in cholesterol, fat, and sodium is recommended for good health. Unless I have been given specific instructions below for another diet, I accept this instruction as my diet prescription.   Other diet: Home diet as tolerated    Special Instructions: None    · Is patient discharged on Warfarin / Coumadin?   No     Depression / Suicide Risk    As you are discharged from this [facility] facility, it is important to learn how to keep safe from harming yourself.    Recognize the warning signs:  · Abrupt changes in personality, positive or negative- including increase in energy   · Giving away possessions  · Change in eating patterns- significant weight changes-  positive or negative  · Change in sleeping patterns- unable to sleep or sleeping all the time   · Unwillingness or inability to communicate  · Depression  · Unusual sadness, discouragement and loneliness  · Talk of wanting to die  · Neglect of personal appearance   · Rebelliousness- reckless behavior  · Withdrawal from people/activities they love  · Confusion- inability to concentrate     If you or a loved one observes any of these behaviors or has concerns about self-harm, here's what you can do:  · Talk about it- your feelings and reasons for harming yourself  · Remove any means that you might use to hurt yourself (examples: pills, rope, extension cords, firearm)  · Get professional help from the community (Mental Health, Substance  Abuse, psychological counseling)  · Do not be alone:Call your Safe Contact- someone whom you trust who will be there for you.  · Call your local CRISIS HOTLINE 985-9006 or 761-001-5847  · Call your local Children's Mobile Crisis Response Team [place] (197) 491-0136 or www.[employer]  · Call the toll free National Suicide Prevention Hotlines   · National Suicide Prevention Lifeline 225-300-UIKM (1873)  · National Hope Line Network 800-SUICIDE (038-1722)

## 2021-06-29 NOTE — CARE PLAN
Problem: Pain - Standard  Goal: Alleviation of pain or a reduction in pain to the patient’s comfort goal  Outcome: Progressing     Problem: Skin Integrity  Goal: Skin integrity is maintained or improved  Outcome: Progressing     Problem: Fall Risk  Goal: Patient will remain free from falls  Outcome: Progressing   The patient is Stable - Low risk of patient condition declining or worsening    Shift Goals  Clinical Goals: No falls  Patient Goals: rest    Progress made toward(s) clinical / shift goals:  No falls this shift, pt able to rest in afternoon    Patient is not progressing towards the following goals: N/A

## 2021-06-29 NOTE — DISCHARGE PLANNING
Anticipated Discharge Disposition:   Home to [facility]    Action:   Chart review complete.     Per MD, patient is medically cleared to discharge back to . Patient's daughter would like aid in providing transport back to . RN CM to call patient's  to see what they need faxed and to see when a good transport time is.     1000: RN CM called patient's  and spoke to Kayla. Per Kayla, she will need a progress note, discharge summary and discharge instructions faxed to her at [phone]. All hard scripts must go with the patient. MD aware.     Transport forms started for patient.     1014: Transport forms faxed to ride line. Call placed to patient's daughter, Anahi. IMM given and verbally signed. Anahi would like a  list sent to her email.     1107: Transportation confirmed for back to  at 1500 today. Pending Discharge summary     1234: Discharge summary, MD progress note and discharge instructions faxed to [phone]. Bedside RN reminded over voalte that hard script must go with patient.     Barriers to Discharge:   None     Plan:   Hospital care management will continue to assist with discharge planning needs.

## 2021-06-29 NOTE — DISCHARGE SUMMARY
Discharge Summary    CHIEF COMPLAINT ON ADMISSION  Chief Complaint   Patient presents with   • GLF   • Knee Pain     rt   • Bleeding/Bruising     left forehead       Reason for Admission  EMS     Admission Date  6/27/2021    CODE STATUS  DNAR, I OK    HPI & HOSPITAL COURSE  86 y.o. female who presented 6/27/2021 with GLF.  Patient was diagnosed with UTI on 6/25/21, was on Bactrim but had shown no improvement. Patient worsened, and subsequently suffered GLF at [facility] care. Patient has severe dementia, unable to participate in questions. [place] told daughter she had significant behavioral changes. Admitting physician spoke with daughter, Anahi Lancaster, informed me patient has sun-downing issues at night.  Patient was admitted and started on IV cefepime for 3 days and sensitivities reported.  Will complete 4 more days of PO cipro for total of 7 day course of treatment.  Patient to be transported back to her memory care facility.      Therefore, she is discharged in good and stable condition to home with close outpatient follow-up.    The patient met 2-midnight criteria for an inpatient stay at the time of discharge.    Discharge Date  6/29/2021    FOLLOW UP ITEMS POST DISCHARGE  PCP - 2 weeks    DISCHARGE DIAGNOSES  Principal Problem:    Acute encephalopathy POA: Yes  Active Problems:    Recurrent UTI POA: Yes    Alzheimer's dementia with behavioral disturbance (HCC) POA: Yes    Hypernatremia POA: Yes    Fall from ground level POA: Yes  Resolved Problems:    * No resolved hospital problems. *      FOLLOW UP  No future appointments.  [facility]  [address]  [address]  [facility] [id number] [phone]    In 2 weeks        MEDICATIONS ON DISCHARGE     Medication List      START taking these medications      Instructions   ciprofloxacin 500 MG Tabs  Commonly known as: CIPRO   Take 1 tablet by mouth 2 times a day for 4 days.  Dose: 500 mg        CONTINUE taking these medications      Instructions    artificial tears Oint ophth ointment  Commonly known as: EYE LUBRICANT   Apply 2 Applications to both eyes 4 times a day.  Dose: 2 Application     diazePAM 5 MG Tabs  Commonly known as: VALIUM   Take 5 mg by mouth at bedtime.  Dose: 5 mg     furosemide 20 MG Tabs  Commonly known as: LASIX   Take 20 mg by mouth every day.  Dose: 20 mg     potassium chloride SA 10 MEQ Tbcr  Commonly known as: K-DUR   Take 10 mEq by mouth 2 times a day.  Dose: 10 mEq        STOP taking these medications    sulfamethoxazole-trimethoprim 800-160 MG tablet  Commonly known as: BACTRIM DS            Allergies  Allergies   Allergen Reactions   • Other Environmental Runny Nose and Itching     pollens       DIET  Orders Placed This Encounter   Procedures   • Diet Order Diet: Level 4 - Pureed (1:1 feeding, meds floated or crushed); Liquid level: Level 2 - Mildly Thick; Tray Modifications (optional): SLP - 1:1 Supervision by Nursing, SLP - Deliver to Nursing Station     Standing Status:   Standing     Number of Occurrences:   1     Order Specific Question:   Diet:     Answer:   Level 4 - Pureed [25]     Comments:   1:1 feeding, meds floated or crushed     Order Specific Question:   Liquid level     Answer:   Level 2 - Mildly Thick     Order Specific Question:   Tray Modifications (optional)     Answer:   SLP - 1:1 Supervision by Nursing     Order Specific Question:   Tray Modifications (optional)     Answer:   SLP - Deliver to Nursing Station       ACTIVITY  As tolerated.  Weight bearing as tolerated    CONSULTATIONS  none    PROCEDURES  none    LABORATORY  Lab Results   Component Value Date    SODIUM 148 (H) 06/28/2021    POTASSIUM 3.6 06/28/2021    CHLORIDE 114 (H) 06/28/2021    CO2 25 06/28/2021    GLUCOSE 102 (H) 06/28/2021    BUN 18 06/28/2021    CREATININE 0.93 06/28/2021    CREATININE 0.88 11/28/2012    GLOMRATE >59 11/19/2010        Lab Results   Component Value Date    WBC 8.6 06/28/2021    WBC 5.5 06/08/2011    HEMOGLOBIN 12.3  06/28/2021    HEMATOCRIT 38.0 06/28/2021    PLATELETCT 206 06/28/2021        Total time of the discharge process exceeds 35 minutes.

## 2021-06-29 NOTE — DISCHARGE PLANNING
Received Transport Form @ 1012  Spoke to Ifeanyi COLLINS    Transport is scheduled for 6/29 @1500 going to [facility] .    RN CM and BS RN notified of scheduled transport via Voalte @5142.

## 2021-06-29 NOTE — PROGRESS NOTES
Report received and care assumed. Pt alert, speech fast and incomprehensible, unable to assess orientation. Pt agitated at times but easily comforted and redirected. Resting comfortably in bed. Will continue to monitor. Bed alarm in place.

## 2021-06-29 NOTE — WOUND TEAM
"[facility]  Inpatient Services  Initial Wound and Skin Care Evaluation    Admission Date: 2021     Last order of IP CONSULT TO WOUND CARE was found on 2021 from Hospital Encounter on 2021     HPI, PMH, SH: Reviewed    Past Surgical History:   Procedure Laterality Date   • HIP CANNULATED SCREW Right 2019    Procedure: FIXATION, HIP, USING CANNULATED SCREW;  Surgeon: Oleg Corona M.D.;  Location: SURGERY [facility];  Service: Orthopedics   • HIP CANNULATED SCREW Left 2018    Procedure: HIP CANNULATED SCREW;  Surgeon: Hu Galaviz M.D.;  Location: SURGERY [facility];  Service: Orthopedics   • TUBAL COAGULATION LAPAROSCOPIC BILATERAL  1973     Social History     Tobacco Use   • Smoking status: Former Smoker     Packs/day: 1.50     Years: 45.00     Pack years: 67.50     Types: Cigarettes     Quit date: 2004     Years since quittin.5   • Smokeless tobacco: Never Used   Substance Use Topics   • Alcohol use: Yes     Alcohol/week: 3.5 - 17.5 oz     Types: 7 - 35 Glasses of wine per week     Comment: Occasionally     Chief Complaint   Patient presents with   • GLF   • Knee Pain     rt   • Bleeding/Bruising     left forehead     Diagnosis: UTI (urinary tract infection) [N39.0]    Unit where seen by Wound Team:      WOUND CONSULT/FOLLOW UP RELATED TO:  R knee, R elbow, L side of head, R FA    WOUND HISTORY:  \"86 y.o. female who presented 2021 with GLF.     Patient diagnosed with UTI on 21, was on Bactrim but has shown no improvement. Patient worsened, and subsequently suffered GLF at memory care. Patient has severe dementia, unable to participate in questions. [place] told daughter she had significant behavioral changes. Spoke with Daughter Anahi Lancaster, informed me patient has sun-downing issues at night, may pull out her IV lines at this current mental state. New body tremors in past 2 bed.     Baseline behavior: wandering, going " "through drawers, stays up at night, non-violent, redirectable, facial tremors, no body tremors. \"    WOUND ASSESSMENT/LDA  Wound 06/27/21 Skin Tear Knee Anterior Right (Active)      06/27/21 2311   Site Assessment Red;Dry 06/28/21 1900   Periwound Assessment Ecchymosis 06/28/21 1900   Margins Defined edges 06/28/21 1900   Closure Open to air 06/28/21 1900   Drainage Amount None 06/28/21 1900   Dressing Options Open to Air 06/28/21 1900   NEXT Weekly Photo (Inpatient Only) 07/04/21 06/28/21 1900   Non-staged Wound Description Partial thickness 06/28/21 1900   Wound Length (cm) 2 cm 06/28/21 1900   Wound Width (cm) 1.8 cm 06/28/21 1900   Wound Surface Area (cm^2) 3.6 cm^2 06/28/21 1900   Shape circular 06/28/21 1900   Wound Odor None 06/28/21 1900   Exposed Structures None 06/28/21 1900   Number of days: 1       Wound 06/27/21 Skin Tear Elbow Right (Active)      06/27/21 2312   Site Assessment Dry;Pink    Periwound Assessment Ecchymosis    Margins Defined edges;Attached edges    Closure Open to air    Drainage Amount None    Dressing Options Open to Air    NEXT Weekly Photo (Inpatient Only) 07/04/21    Non-staged Wound Description Full thickness    Wound Length (cm) 9.5 cm    Wound Width (cm) 5.5 cm    Wound Surface Area (cm^2) 52.25 cm^2    Shape irregular    Wound Odor None    Exposed Structures None    Number of days: 1       Wound 06/27/21 Abrasion Face Left (Active)      06/27/21 2314   Site Assessment Purple;Pink;Red    Periwound Assessment Red;Purple;Pink    Margins Attached edges    Closure Open to air    Drainage Amount None    Number of days: 1        Vascular:    ABRAHAN:   No results found.    Lab Values:    Lab Results   Component Value Date/Time    WBC 8.6 06/28/2021 04:25 AM    WBC 5.5 06/08/2011 09:08 AM    RBC 3.84 (L) 06/28/2021 04:25 AM    RBC 4.44 06/08/2011 09:08 AM    HEMOGLOBIN 12.3 06/28/2021 04:25 AM    HEMATOCRIT 38.0 06/28/2021 04:25 AM    HBA1C 6.0 (H) 04/24/2018 05:33 AM        Culture Results " show:  No results found for this or any previous visit (from the past 720 hour(s)).    Pain Level/Medicated:  No c/o pain       INTERVENTIONS BY WOUND TEAM:  Chart and images reviewed. Discussed with bedside RN. This RN in to assess patient. Performed standard wound care which includes appropriate positioning, dressing removal and non-selective debridement.   Preparation for Dressing removal: all wounds LIZ  Cleansed with:  NS and gauze.  Sharp debridement: NA  Renee wound: Cleansed with NA, Prepped with NA  Primary Dressing: LIZ  Secondary (Outer) Dressing: NA    Interdisciplinary consultation: Patient, Bedside RN     EVALUATION / RATIONALE FOR TREATMENT:  Most Recent Date:  6/28: Pt has small scabs present to R knee, R elbow and scrape to L side of head. All the wounds have surrounding ecchymosis. No drainage present, dry scabs. R knee 7.5 x 7 cm dark purple periwound skin. R FA with ecchymosis 9.5 x 5.5 cm. L side of head 8 x 5 cm multicolored with some green yellow. Lateral L side of knee is red non-blanching unsure if developing DTI or ecchymosis. Pt had CLF and unsure how long she had been down.  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     Goals: Steady decrease in wound area and depth weekly.    WOUND TEAM PLAN OF CARE ([X] for frequency of wound follow up,):   Nursing to follow orders written for wound care. Contact wound team if area fails to progress, deteriorates or with any questions/concerns  Dressing changes by wound team:                   Follow up 3 times weekly:                NPWT change 3 times weekly:     Follow up 1-2 times weekly:      Follow up Bi-Monthly:                   Follow up as needed:     Other (explain):  Pt has now advanced wound care needs @ this time.    NURSING PLAN OF CARE ORDERS (X):  Dressing changes: See Dressing Care orders:   Skin care: See Skin Care orders:   RN Prevention Protocol: x  Rectal tube care: See Rectal  Tube Care orders:   Other orders:    RSKIN:   CURRENTLY IN PLACE (X), APPLIED THIS VISIT (A), ORDERED (O):   Q shift Elian:  X  Q shift pressure point assessments:  X    Surface/Positioning   Pressure redistribution mattress    x        Low Airloss          Bariatric foam      Bariatric RILEY     Waffle cushion        Waffle Overlay          Reposition q 2 hours    x  TAPs Turning system     Z Hansel Pillow     Offloading/Redistribution not assessed  Sacral Mepilex (Silicone dressing)     Heel Mepilex (Silicone dressing)         Heel float boots (Prevalon boot)             Float Heels off Bed with Pillows           Respiratory RA  Silicone O2 tubing         Gray Foam Ear protectors     Cannula fixation Device (Tender )          High flow offloading Clip    Elastic head band offloading device      Anchorfast                                                         Trach with Optifoam split foam             Containment/Moisture Prevention not assessedRectal tube or BMS    Purwick/Condom Cath        Delatorre Catheter    Barrier wipes           Barrier paste       Antifungal tx      Interdry        Mobilization not assessed      Up to chair        Ambulate      PT/OT      Nutrition       Dietician        Diabetes Education      PO  x   TF     TPN     NPO   # days     Other        Anticipated discharge plans: no advanced wound needs @ this time  LTACH:        SNF/Rehab:                  Home Health Care:           Outpatient Wound Center:            Self/Family Care:        Other:

## 2021-07-09 ENCOUNTER — HOSPITAL ENCOUNTER (EMERGENCY)
Facility: MEDICAL CENTER | Age: 86
End: 2021-07-10
Attending: EMERGENCY MEDICINE
Payer: MEDICARE

## 2021-07-09 ENCOUNTER — APPOINTMENT (OUTPATIENT)
Dept: RADIOLOGY | Facility: MEDICAL CENTER | Age: 86
End: 2021-07-09
Attending: EMERGENCY MEDICINE
Payer: MEDICARE

## 2021-07-09 DIAGNOSIS — R60.9 PERIPHERAL EDEMA: ICD-10-CM

## 2021-07-09 DIAGNOSIS — R46.89 AGGRESSIVE BEHAVIOR: ICD-10-CM

## 2021-07-09 LAB
ALBUMIN SERPL BCP-MCNC: 3.4 G/DL (ref 3.2–4.9)
ALBUMIN/GLOB SERPL: 1.3 G/DL
ALP SERPL-CCNC: 116 U/L (ref 30–99)
ALT SERPL-CCNC: 12 U/L (ref 2–50)
ANION GAP SERPL CALC-SCNC: 8 MMOL/L (ref 7–16)
APPEARANCE UR: CLEAR
AST SERPL-CCNC: 15 U/L (ref 12–45)
BASOPHILS # BLD AUTO: 0.6 % (ref 0–1.8)
BASOPHILS # BLD: 0.07 K/UL (ref 0–0.12)
BILIRUB SERPL-MCNC: 0.4 MG/DL (ref 0.1–1.5)
BILIRUB UR QL STRIP.AUTO: ABNORMAL
BUN SERPL-MCNC: 22 MG/DL (ref 8–22)
CALCIUM SERPL-MCNC: 8.7 MG/DL (ref 8.4–10.2)
CHLORIDE SERPL-SCNC: 108 MMOL/L (ref 96–112)
CO2 SERPL-SCNC: 26 MMOL/L (ref 20–33)
COLOR UR: YELLOW
CREAT SERPL-MCNC: 0.93 MG/DL (ref 0.5–1.4)
EOSINOPHIL # BLD AUTO: 0.16 K/UL (ref 0–0.51)
EOSINOPHIL NFR BLD: 1.4 % (ref 0–6.9)
ERYTHROCYTE [DISTWIDTH] IN BLOOD BY AUTOMATED COUNT: 49.2 FL (ref 35.9–50)
GLOBULIN SER CALC-MCNC: 2.7 G/DL (ref 1.9–3.5)
GLUCOSE SERPL-MCNC: 139 MG/DL (ref 65–99)
GLUCOSE UR STRIP.AUTO-MCNC: NEGATIVE MG/DL
HCT VFR BLD AUTO: 33.3 % (ref 37–47)
HGB BLD-MCNC: 11.4 G/DL (ref 12–16)
IMM GRANULOCYTES # BLD AUTO: 0.06 K/UL (ref 0–0.11)
IMM GRANULOCYTES NFR BLD AUTO: 0.5 % (ref 0–0.9)
KETONES UR STRIP.AUTO-MCNC: ABNORMAL MG/DL
LACTATE BLD-SCNC: 1.7 MMOL/L (ref 0.5–2)
LEUKOCYTE ESTERASE UR QL STRIP.AUTO: NEGATIVE
LYMPHOCYTES # BLD AUTO: 1.91 K/UL (ref 1–4.8)
LYMPHOCYTES NFR BLD: 17.1 % (ref 22–41)
MCH RBC QN AUTO: 32.2 PG (ref 27–33)
MCHC RBC AUTO-ENTMCNC: 34.2 G/DL (ref 33.6–35)
MCV RBC AUTO: 94.1 FL (ref 81.4–97.8)
MICRO URNS: ABNORMAL
MONOCYTES # BLD AUTO: 0.75 K/UL (ref 0–0.85)
MONOCYTES NFR BLD AUTO: 6.7 % (ref 0–13.4)
NEUTROPHILS # BLD AUTO: 8.19 K/UL (ref 2–7.15)
NEUTROPHILS NFR BLD: 73.7 % (ref 44–72)
NITRITE UR QL STRIP.AUTO: NEGATIVE
NRBC # BLD AUTO: 0 K/UL
NRBC BLD-RTO: 0 /100 WBC
NT-PROBNP SERPL IA-MCNC: 353 PG/ML (ref 0–125)
PH UR STRIP.AUTO: 5 [PH] (ref 5–8)
PLATELET # BLD AUTO: 292 K/UL (ref 164–446)
PMV BLD AUTO: 10.1 FL (ref 9–12.9)
POTASSIUM SERPL-SCNC: 3.1 MMOL/L (ref 3.6–5.5)
PROT SERPL-MCNC: 6.1 G/DL (ref 6–8.2)
PROT UR QL STRIP: NEGATIVE MG/DL
RBC # BLD AUTO: 3.54 M/UL (ref 4.2–5.4)
RBC UR QL AUTO: NEGATIVE
SODIUM SERPL-SCNC: 142 MMOL/L (ref 135–145)
SP GR UR STRIP.AUTO: >=1.03
WBC # BLD AUTO: 11.1 K/UL (ref 4.8–10.8)

## 2021-07-09 PROCEDURE — 36415 COLL VENOUS BLD VENIPUNCTURE: CPT

## 2021-07-09 PROCEDURE — 85025 COMPLETE CBC W/AUTO DIFF WBC: CPT

## 2021-07-09 PROCEDURE — 99284 EMERGENCY DEPT VISIT MOD MDM: CPT

## 2021-07-09 PROCEDURE — 80053 COMPREHEN METABOLIC PANEL: CPT

## 2021-07-09 PROCEDURE — 83880 ASSAY OF NATRIURETIC PEPTIDE: CPT

## 2021-07-09 PROCEDURE — 93970 EXTREMITY STUDY: CPT

## 2021-07-09 PROCEDURE — 83605 ASSAY OF LACTIC ACID: CPT

## 2021-07-09 PROCEDURE — 81003 URINALYSIS AUTO W/O SCOPE: CPT

## 2021-07-09 RX ORDER — CIPROFLOXACIN 250 MG/1
250 TABLET, FILM COATED ORAL DAILY
COMMUNITY

## 2021-07-09 RX ORDER — ZOLPIDEM TARTRATE 5 MG/1
5 TABLET ORAL NIGHTLY PRN
COMMUNITY

## 2021-07-09 ASSESSMENT — FIBROSIS 4 INDEX: FIB4 SCORE: 2.27

## 2021-07-10 VITALS
DIASTOLIC BLOOD PRESSURE: 61 MMHG | WEIGHT: 130 LBS | TEMPERATURE: 98.2 F | HEART RATE: 88 BPM | HEIGHT: 64 IN | RESPIRATION RATE: 20 BRPM | OXYGEN SATURATION: 95 % | SYSTOLIC BLOOD PRESSURE: 113 MMHG | BODY MASS INDEX: 22.2 KG/M2

## 2021-07-10 NOTE — ED NOTES
Patient taken by [facility] back to group home.  Attempted to call to remind staff patient is on their way no answer at this time, [facility] will attempt to take patient and will call if there are any issues

## 2021-07-10 NOTE — ED PROVIDER NOTES
ED Provider Note    CHIEF COMPLAINT  Chief Complaint   Patient presents with   • Aggressive Behavior   • Leg Swelling     bilateral left greater than right        HPI  Ang Lancaster is a 86 y.o. female with history of dementia, recurrent urinary tract infections which typically present with combativeness.  Patient with recent admission for combativeness, found to have UTI, urinalysis revealing findings consistent with urinary tract infection with culture showing E. coli with sensitivity to Cipro.  Patient was sent back to [facility] care with Cipro.  Patient returns to our care facility for aggressive behavior and leg swelling    REVIEW OF SYSTEMS  ROS    See HPI for further details. All other systems are negative.     PAST MEDICAL HISTORY   has a past medical history of Degenerative joint disease, [place] (hard of hearing), IFG (impaired fasting glucose), Impaired fasting glucose, Meige syndrome (blepharospasm with oromandibular dystonia), Osteoporosis, Recurrent UTI, Risk for falls (10/11/2017), and Vertigo.    SOCIAL HISTORY  Social History     Tobacco Use   • Smoking status: Former Smoker     Packs/day: 1.50     Years: 45.00     Pack years: 67.50     Types: Cigarettes     Quit date: 2004     Years since quittin.5   • Smokeless tobacco: Never Used   Substance and Sexual Activity   • Alcohol use: Yes     Alcohol/week: 3.5 - 17.5 oz     Types: 7 - 35 Glasses of wine per week     Comment: Occasionally   • Drug use: Yes     Comment: CBD oil   • Sexual activity: Not on file       SURGICAL HISTORY   has a past surgical history that includes tubal coagulation laparoscopic bilateral (); hip cannulated screw (Left, 2018); and hip cannulated screw (Right, 2019).    CURRENT MEDICATIONS  Home Medications     Reviewed by Nu Bess R.N. (Registered Nurse) on 21 at 1824  Med List Status: Complete   Medication Last Dose Status   artificial tears (EYE LUBRICANT) Ointment ophth ointment 2021 Active    ciprofloxacin (CIPRO) 250 MG Tab 7/9/2021 Active   diazePAM (VALIUM) 5 MG Tab 7/9/2021 Active   furosemide (LASIX) 20 MG Tab  Active   potassium chloride SA (K-DUR) 10 MEQ Tab CR  Active   zolpidem (AMBIEN) 5 MG Tab 7/8/2021 Active                ALLERGIES  Allergies   Allergen Reactions   • Other Environmental Runny Nose and Itching     pollens       PHYSICAL EXAM  Vitals:    07/09/21 1815   BP: (!) 92/53   Pulse: 85   Resp: 16   Temp: 36.8 °C (98.2 °F)   SpO2: 94%       Physical Exam  Constitutional:       Appearance: She is normal weight.   HENT:      Head: Normocephalic.   Cardiovascular:      Rate and Rhythm: Normal rate and regular rhythm.   Pulmonary:      Effort: Pulmonary effort is normal.      Breath sounds: Normal breath sounds.   Abdominal:      General: Abdomen is flat.      Palpations: Abdomen is soft.   Skin:     Capillary Refill: Capillary refill takes less than 2 seconds.   Neurological:      General: No focal deficit present.      Mental Status: She is alert.      Comments: Alert and oriented x1   Psychiatric:      Comments: Happy and following commands           DIAGNOSTIC STUDIES / PROCEDURES    LABS  Results for orders placed or performed during the hospital encounter of 07/09/21   CBC WITH DIFFERENTIAL   Result Value Ref Range    WBC 11.1 (H) 4.8 - 10.8 K/uL    RBC 3.54 (L) 4.20 - 5.40 M/uL    Hemoglobin 11.4 (L) 12.0 - 16.0 g/dL    Hematocrit 33.3 (L) 37.0 - 47.0 %    MCV 94.1 81.4 - 97.8 fL    MCH 32.2 27.0 - 33.0 pg    MCHC 34.2 33.6 - 35.0 g/dL    RDW 49.2 35.9 - 50.0 fL    Platelet Count 292 164 - 446 K/uL    MPV 10.1 9.0 - 12.9 fL    Neutrophils-Polys 73.70 (H) 44.00 - 72.00 %    Lymphocytes 17.10 (L) 22.00 - 41.00 %    Monocytes 6.70 0.00 - 13.40 %    Eosinophils 1.40 0.00 - 6.90 %    Basophils 0.60 0.00 - 1.80 %    Immature Granulocytes 0.50 0.00 - 0.90 %    Nucleated RBC 0.00 /100 WBC    Neutrophils (Absolute) 8.19 (H) 2.00 - 7.15 K/uL    Lymphs (Absolute) 1.91 1.00 - 4.80 K/uL     Monos (Absolute) 0.75 0.00 - 0.85 K/uL    Eos (Absolute) 0.16 0.00 - 0.51 K/uL    Baso (Absolute) 0.07 0.00 - 0.12 K/uL    Immature Granulocytes (abs) 0.06 0.00 - 0.11 K/uL    NRBC (Absolute) 0.00 K/uL   CMP   Result Value Ref Range    Sodium 142 135 - 145 mmol/L    Potassium 3.1 (L) 3.6 - 5.5 mmol/L    Chloride 108 96 - 112 mmol/L    Co2 26 20 - 33 mmol/L    Anion Gap 8.0 7.0 - 16.0    Glucose 139 (H) 65 - 99 mg/dL    Bun 22 8 - 22 mg/dL    Creatinine 0.93 0.50 - 1.40 mg/dL    Calcium 8.7 8.4 - 10.2 mg/dL    AST(SGOT) 15 12 - 45 U/L    ALT(SGPT) 12 2 - 50 U/L    Alkaline Phosphatase 116 (H) 30 - 99 U/L    Total Bilirubin 0.4 0.1 - 1.5 mg/dL    Albumin 3.4 3.2 - 4.9 g/dL    Total Protein 6.1 6.0 - 8.2 g/dL    Globulin 2.7 1.9 - 3.5 g/dL    A-G Ratio 1.3 g/dL   URINALYSIS,CULTURE IF INDICATED    Specimen: Urine   Result Value Ref Range    Color Yellow     Character Clear     Specific Gravity >=1.030 <1.035    Ph 5.0 5.0 - 8.0    Glucose Negative Negative mg/dL    Ketones Trace (A) Negative mg/dL    Protein Negative Negative mg/dL    Bilirubin Small (A) Negative    Nitrite Negative Negative    Leukocyte Esterase Negative Negative    Occult Blood Negative Negative    Micro Urine Req see below    proBrain Natriuretic Peptide, NT   Result Value Ref Range    NT-proBNP 353 (H) 0 - 125 pg/mL   LACTIC ACID   Result Value Ref Range    Lactic Acid 1.7 0.5 - 2.0 mmol/L   ESTIMATED GFR   Result Value Ref Range    GFR If African American >60 >60 mL/min/1.73 m 2    GFR If Non  57 (A) >60 mL/min/1.73 m 2         RADIOLOGY  US-EXTREMITY VENOUS LOWER BILAT   Final Result         Limited exam due to patient condition.      1. No evidence of bilateral lower extremity deep venous thrombosis.        COURSE & MEDICAL DECISION MAKING  Pertinent Labs & Imaging studies reviewed. (See chart for details)    Patient here with severe dementia, possible recurrent UTI as the cause of patient's sundowning versus ongoing progression  "of her chronic dementia.  Patient without any evidence of head trauma.  Her exam is otherwise unremarkable outside of lower extremity edema.  I did check basic labs and included a BNP, edema appears chronic.  Patient BNP is not greater than 1000, bilateral ultrasounds failed to reveal any evidence of blood clot..  Patient basic labs are unremarkable, her urinalysis is inconsistent with infection.  I discussed the case with her nurse who reports that her \" combativeness\" was that she did not want to get into bed and was walking around the unit throughout the day.  I do believe the patient is safe to return back to the skilled nursing facility but discussed with her nurse that potentially having her family or the facility discuss senior bridges as a possible transfer    The patient will return for worsening symptoms and is stable at the time of discharge. The patient verbalizes understanding and will comply.    FINAL IMPRESSION    1. Peripheral edema    2. Aggressive behavior               Electronically signed by: Alexandre Ramos M.D., 7/9/2021 6:27 PM    "

## 2021-07-10 NOTE — ED NOTES
Patient continues to pull at cables and blankets, repositioned in bed, updated on plan of care.

## 2021-07-10 NOTE — DISCHARGE INSTRUCTIONS
Patient work-up today was reassuring. She is likely most appropriate for [facility], however they do not do assessments at night, I would like your facility or her daughter to call [facility] tomorrow to do an evaluation to see if they can take patient at their facility. At this point patient does not require inpatient hospitalization but will need ongoing nursing care given her dementia.

## 2021-07-10 NOTE — ED TRIAGE NOTES
"87 yo female BIB [facility] from group home with reports of being more aggressive and having bilateral leg swelling.  Per [facility] she is A & O X1 and this is possibly her baseline, group home was not able to give more information.  Patient will only say \"HI\" and will otherwise not answer any questions.    "

## 2022-01-14 NOTE — CARE PLAN
The patient is Stable - Low risk of patient condition declining or worsening    Shift Goals  Clinical Goals: rest, no injuries  Patient Goals: APOORVA    Progress made toward(s) clinical / shift goals: Pt appears to be resting comfortably, skin intact, fall precautions in place.       Problem: Pain - Standard  Goal: Alleviation of pain or a reduction in pain to the patient’s comfort goal  Outcome: Progressing     Problem: Skin Integrity  Goal: Skin integrity is maintained or improved  Outcome: Progressing     Problem: Fall Risk  Goal: Patient will remain free from falls  Outcome: Progressing          Wartpeel Counseling:  I discussed with the patient the risks of Wartpeel including but not limited to erythema, scaling, itching, weeping, crusting, and pain.

## 2024-05-15 NOTE — PROGRESS NOTES
"  Subjective:     Ang Lancaster is a 83 y.o. female who presents with facial spasms.    HPI:   Seen in f/u for facial spasms.  seh was started on tizanidine at last appt for her spasms.  It made her eyes worse.  She also could not take with her valium.  She stopped after taking for several days.        Patient Active Problem List    Diagnosis Date Noted   • IFG (impaired fasting glucose)    • Risk for falls 10/11/2017   • Osteoporosis    • Recurrent UTI    • Meige syndrome (blepharospasm with oromandibular dystonia)    • Degenerative joint disease    • Impaired fasting glucose    • Vertigo 03/20/2012   • Preventative health care 11/10/2009   • [place] (hard of hearing)        Current medicines (including changes today)  Current Outpatient Prescriptions   Medication Sig Dispense Refill   • tizanidine (ZANAFLEX) 4 MG Tab Take 1 Tab by mouth every bedtime. 30 Tab 3   • diazepam (VALIUM) 5 MG Tab Take 1 Tab by mouth every 24 hours as needed for Anxiety. 90 Tab 1   • famotidine (PEPCID) 20 MG TABS Take 20 mg by mouth every day.     • MULTIVITAL PO Take 1 Tab by mouth every day.     • CALTRATE 600+D PO Take 1 Tab by mouth every day.       No current facility-administered medications for this visit.        Allergies   Allergen Reactions   • Nkda [No Known Drug Allergy]    • Other Environmental      pollens       ROS  Constitutional: Negative. Negative for fever, chills, weight loss, malaise/fatigue and diaphoresis.   HENT: Negative. Negative for hearing loss, ear pain, nosebleeds, congestion, sore throat, neck pain, tinnitus and ear discharge.   Respiratory: Negative. Negative for cough, hemoptysis, sputum production, shortness of breath, wheezing and stridor.   Cardiovascular: Negative. Negative for chest pain, palpitations, orthopnea, claudication, leg swelling and PND.        Objective:     Blood pressure 108/66, pulse 77, temperature 36.6 °C (97.8 °F), height 1.626 m (5' 4\"), weight 60.8 kg (134 lb), SpO2 97 %, not " 5/15/2024 PLANNED DISCHARGE TOMORROW MORNING 5/16/2024. Shahla with [facility] following. NO PRECERT, Needs signed DEMETRIA, HENS completed. Discharge arranged for 11am  per pcp request. [facility] wheelchair transport per [facility] [phone]. Georgi the legal nok and pt are aware they will be responsible for the costs of transportation to the SNF, they agree to pay as previously. WC form in soft chart. Need IMM- per CM asst in am. Electronically signed by Margret Small RN-BC on 5/15/2024 at 3:30 PM   currently breastfeeding. Body mass index is 23 kg/m².    Physical Exam:  Vitals reviewed.  Constitutional: Oriented to person, place, and time. appears well-developed and well-nourished. No distress.   Cardiovascular: Normal rate, regular rhythm, normal heart sounds and intact distal pulses. Exam reveals no gallop and no friction rub. No murmur heard. No carotid bruits.   Pulmonary/Chest: Effort normal and breath sounds normal. No stridor. No respiratory distress. no wheezes or rales. exhibits no tenderness.   Musculoskeletal: Normal range of motion.  Neurological: Alert and oriented to person, place, and time. exhibits normal muscle tone.  Skin: Skin is warm and dry. No diaphoresis.   Psychiatric: Normal mood and affect. Behavior is normal.      Assessment and Plan:     The following treatment plan was discussed:    1. Facial spasm      tizanidine did not help.  continue valium.  do lab and f/u 3 months.          Followup: Return in about 3 months (around 6/13/2018).
